# Patient Record
Sex: MALE | Race: WHITE | NOT HISPANIC OR LATINO | URBAN - METROPOLITAN AREA
[De-identification: names, ages, dates, MRNs, and addresses within clinical notes are randomized per-mention and may not be internally consistent; named-entity substitution may affect disease eponyms.]

---

## 2021-05-04 ENCOUNTER — INPATIENT (INPATIENT)
Facility: HOSPITAL | Age: 46
LOS: 5 days | Discharge: PSYCHIATRIC OR SUBSTANCE USE FACILITY | End: 2021-05-10
Attending: LEGAL MEDICINE | Admitting: LEGAL MEDICINE
Payer: MEDICARE

## 2021-05-04 VITALS
RESPIRATION RATE: 18 BRPM | TEMPERATURE: 99 F | DIASTOLIC BLOOD PRESSURE: 97 MMHG | OXYGEN SATURATION: 99 % | HEART RATE: 100 BPM | SYSTOLIC BLOOD PRESSURE: 180 MMHG

## 2021-05-04 DIAGNOSIS — R41.82 ALTERED MENTAL STATUS, UNSPECIFIED: ICD-10-CM

## 2021-05-04 LAB
ALBUMIN SERPL ELPH-MCNC: 3.8 G/DL — SIGNIFICANT CHANGE UP (ref 3.3–5)
ALP SERPL-CCNC: 95 U/L — SIGNIFICANT CHANGE UP (ref 40–120)
ALT FLD-CCNC: 6 U/L — SIGNIFICANT CHANGE UP (ref 4–41)
ANION GAP SERPL CALC-SCNC: 13 MMOL/L — SIGNIFICANT CHANGE UP (ref 7–14)
APAP SERPL-MCNC: <15 UG/ML — SIGNIFICANT CHANGE UP (ref 15–25)
AST SERPL-CCNC: 11 U/L — SIGNIFICANT CHANGE UP (ref 4–40)
BASOPHILS # BLD AUTO: 0.04 K/UL — SIGNIFICANT CHANGE UP (ref 0–0.2)
BASOPHILS NFR BLD AUTO: 0.4 % — SIGNIFICANT CHANGE UP (ref 0–2)
BILIRUB SERPL-MCNC: 0.7 MG/DL — SIGNIFICANT CHANGE UP (ref 0.2–1.2)
BUN SERPL-MCNC: 13 MG/DL — SIGNIFICANT CHANGE UP (ref 7–23)
CALCIUM SERPL-MCNC: 9 MG/DL — SIGNIFICANT CHANGE UP (ref 8.4–10.5)
CHLORIDE SERPL-SCNC: 101 MMOL/L — SIGNIFICANT CHANGE UP (ref 98–107)
CO2 SERPL-SCNC: 28 MMOL/L — SIGNIFICANT CHANGE UP (ref 22–31)
CREAT SERPL-MCNC: 0.83 MG/DL — SIGNIFICANT CHANGE UP (ref 0.5–1.3)
EOSINOPHIL # BLD AUTO: 0.02 K/UL — SIGNIFICANT CHANGE UP (ref 0–0.5)
EOSINOPHIL NFR BLD AUTO: 0.2 % — SIGNIFICANT CHANGE UP (ref 0–6)
ETHANOL SERPL-MCNC: <10 MG/DL — SIGNIFICANT CHANGE UP
GLUCOSE SERPL-MCNC: 94 MG/DL — SIGNIFICANT CHANGE UP (ref 70–99)
HCT VFR BLD CALC: 44.6 % — SIGNIFICANT CHANGE UP (ref 39–50)
HGB BLD-MCNC: 15.2 G/DL — SIGNIFICANT CHANGE UP (ref 13–17)
IANC: 9.77 K/UL — HIGH (ref 1.5–8.5)
IMM GRANULOCYTES NFR BLD AUTO: 0.3 % — SIGNIFICANT CHANGE UP (ref 0–1.5)
LYMPHOCYTES # BLD AUTO: 0.75 K/UL — LOW (ref 1–3.3)
LYMPHOCYTES # BLD AUTO: 6.6 % — LOW (ref 13–44)
MCHC RBC-ENTMCNC: 30.5 PG — SIGNIFICANT CHANGE UP (ref 27–34)
MCHC RBC-ENTMCNC: 34.1 GM/DL — SIGNIFICANT CHANGE UP (ref 32–36)
MCV RBC AUTO: 89.4 FL — SIGNIFICANT CHANGE UP (ref 80–100)
MONOCYTES # BLD AUTO: 0.75 K/UL — SIGNIFICANT CHANGE UP (ref 0–0.9)
MONOCYTES NFR BLD AUTO: 6.6 % — SIGNIFICANT CHANGE UP (ref 2–14)
NEUTROPHILS # BLD AUTO: 9.77 K/UL — HIGH (ref 1.8–7.4)
NEUTROPHILS NFR BLD AUTO: 85.9 % — HIGH (ref 43–77)
NRBC # BLD: 0 /100 WBCS — SIGNIFICANT CHANGE UP
NRBC # FLD: 0 K/UL — SIGNIFICANT CHANGE UP
PLATELET # BLD AUTO: 243 K/UL — SIGNIFICANT CHANGE UP (ref 150–400)
POTASSIUM SERPL-MCNC: 3.4 MMOL/L — LOW (ref 3.5–5.3)
POTASSIUM SERPL-SCNC: 3.4 MMOL/L — LOW (ref 3.5–5.3)
PROT SERPL-MCNC: 6.5 G/DL — SIGNIFICANT CHANGE UP (ref 6–8.3)
RBC # BLD: 4.99 M/UL — SIGNIFICANT CHANGE UP (ref 4.2–5.8)
RBC # FLD: 12.5 % — SIGNIFICANT CHANGE UP (ref 10.3–14.5)
SALICYLATES SERPL-MCNC: <5 MG/DL — LOW (ref 15–30)
SARS-COV-2 RNA SPEC QL NAA+PROBE: SIGNIFICANT CHANGE UP
SODIUM SERPL-SCNC: 142 MMOL/L — SIGNIFICANT CHANGE UP (ref 135–145)
TOXICOLOGY SCREEN, DRUGS OF ABUSE, SERUM RESULT: SIGNIFICANT CHANGE UP
TSH SERPL-MCNC: 1.63 UIU/ML — SIGNIFICANT CHANGE UP (ref 0.27–4.2)
WBC # BLD: 11.36 K/UL — HIGH (ref 3.8–10.5)
WBC # FLD AUTO: 11.36 K/UL — HIGH (ref 3.8–10.5)

## 2021-05-04 PROCEDURE — 70450 CT HEAD/BRAIN W/O DYE: CPT | Mod: 26,77

## 2021-05-04 PROCEDURE — 72125 CT NECK SPINE W/O DYE: CPT | Mod: 26

## 2021-05-04 PROCEDURE — 70450 CT HEAD/BRAIN W/O DYE: CPT | Mod: 26

## 2021-05-04 PROCEDURE — 99233 SBSQ HOSP IP/OBS HIGH 50: CPT

## 2021-05-04 PROCEDURE — 71045 X-RAY EXAM CHEST 1 VIEW: CPT | Mod: 26

## 2021-05-04 PROCEDURE — 99285 EMERGENCY DEPT VISIT HI MDM: CPT | Mod: CS,GC

## 2021-05-04 RX ORDER — POTASSIUM CHLORIDE 20 MEQ
40 PACKET (EA) ORAL ONCE
Refills: 0 | Status: DISCONTINUED | OUTPATIENT
Start: 2021-05-04 | End: 2021-05-04

## 2021-05-04 RX ORDER — SODIUM CHLORIDE 9 MG/ML
1000 INJECTION INTRAMUSCULAR; INTRAVENOUS; SUBCUTANEOUS ONCE
Refills: 0 | Status: COMPLETED | OUTPATIENT
Start: 2021-05-04 | End: 2021-05-04

## 2021-05-04 RX ORDER — POTASSIUM CHLORIDE 20 MEQ
10 PACKET (EA) ORAL ONCE
Refills: 0 | Status: COMPLETED | OUTPATIENT
Start: 2021-05-04 | End: 2021-05-04

## 2021-05-04 RX ORDER — HYDRALAZINE HCL 50 MG
2.5 TABLET ORAL ONCE
Refills: 0 | Status: COMPLETED | OUTPATIENT
Start: 2021-05-04 | End: 2021-05-04

## 2021-05-04 RX ADMIN — SODIUM CHLORIDE 1000 MILLILITER(S): 9 INJECTION INTRAMUSCULAR; INTRAVENOUS; SUBCUTANEOUS at 19:09

## 2021-05-04 RX ADMIN — Medication 2.5 MILLIGRAM(S): at 23:42

## 2021-05-04 RX ADMIN — Medication 100 MILLIEQUIVALENT(S): at 19:09

## 2021-05-04 NOTE — ED PROVIDER NOTE - NS ED ROS FT
CONSTITUTIONAL - No fever, No diaphoresis, No weight change  SKIN - No rash  HEMATOLOGIC - No abnormal bleeding or bruising  EYES - No eye pain, No blurred vision  ENT - No change in hearing, No sore throat, No neck pain, No rhinorrhea, No ear pain  RESPIRATORY - No shortness of breath, No cough  CARDIAC -No chest pain, No palpitations  GI - No abdominal pain, No nausea, No vomiting, No diarrhea, No constipation  - No dysuria, no frequency, no hematuria.   MUSCULOSKELETAL - No joint pain, No swelling, No back pain  NEUROLOGIC - No numbness, No focal weakness, No headache, No dizziness

## 2021-05-04 NOTE — CONSULT NOTE ADULT - ASSESSMENT
45 year old M h/o Schizoprenia 45 year old M h/o Schizoprenia, brought in by father for patient inability to care for himself  On exam, various aged bruises on shoulder and chest    Neurosurgery called for hyperdensity in 3rd ventricle tiny colloid cyst vs blood      - Repeat CT head in 4 hours, if no change in hyperdensity, can proceed with discharge to [facility] if needed  - Patient would benefit from non urgent MRI brain +/- at some point to evaluate if colloid cyst is present  - Case d/w Dr. Tello

## 2021-05-04 NOTE — ED ADULT TRIAGE NOTE - CHIEF COMPLAINT QUOTE
Pt brought in by EMS from home with dad at bedside. Pt has developmental delays and over the past year has been declining but most recently over the past week. Pt is refusing to care for self and refusing to eat.

## 2021-05-04 NOTE — PROVIDER CONTACT NOTE (OTHER) - ASSESSMENT
Patient denies chest pain Patient denies chest pain or headache at this time. Vitals as noted in flowsheet.

## 2021-05-04 NOTE — ED ADULT NURSE NOTE - OBJECTIVE STATEMENT
Receive pt in spot 11 alert and oriented to name brought in by father with decrease in ADL's, x 1 yr;  poor appetite and refusing to go outside since covid.  Pt with flat affect.  Resp unlabored,  Skin pale.  Father at bedside.  IV placed. Labs sent
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
## 2021-05-04 NOTE — ED PROVIDER NOTE - PHYSICAL EXAMINATION
CONSTITUTIONAL: Well-developed; well-nourished; in no acute distress.   SKIN: warm, dry  HEAD: Normocephalic; atraumatic.  EYES: no conjunctival injection.   ENT: No nasal discharge; airway clear.  NECK: Supple; non tender. Erythema around cervical/occipital region.   CARD: S1, S2 normal; no murmurs, gallops, or rubs. Regular rate and rhythm. Ecchymotic bruises on torso   RESP: No wheezes, rales or rhonchi. Good air movement bilaterally.   ABD: soft ntnd, no guarding, no distention, no rigidity.   EXT: Ambulates independently. Ecchymotic bruise on L bicep. Erythema on back around thoracic   NEURO: Alert. has hx of intellectual disability   PSYCH: withdrawn CONSTITUTIONAL: Well-developed; well-nourished; in no acute distress.   SKIN: warm, dry  HEAD: Normocephalic; atraumatic.  EYES: no conjunctival injection.   ENT: No nasal discharge; airway clear.  NECK: Supple; non tender. Erythema around cervical/occipital region.   CARD: S1, S2 normal; no murmurs, gallops, or rubs. Regular rate and rhythm. Ecchymotic bruises on torso   RESP: No wheezes, rales or rhonchi. Good air movement bilaterally.   ABD: soft ntnd, no guarding, no distention, no rigidity.   EXT: Ambulates independently. Ecchymotic bruise on L bicep. Erythema, resolving ecchymosis on back around thoracic   NEURO: awake. Alert. able to follow simple commands and occasional responds with 1-2 words. sensation and motor intact   PSYCH: withdrawn
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
## 2021-05-04 NOTE — ED ADULT NURSE REASSESSMENT NOTE - NS ED NURSE REASSESS COMMENT FT1
Patient received from day RN. A&Ox2 (oriented to self and place). Patient calm and cooperative. Patient breathing even and nonlabored. No complaints at this time. No acute distress. Safety maintained. Patient stable upon exiting the room.
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
## 2021-05-04 NOTE — ED PROVIDER NOTE - CARE PLAN
Principal Discharge DX:	Change in mental status   Principal Discharge DX:	Change in mental status  Secondary Diagnosis:	Schizophrenia
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
## 2021-05-04 NOTE — ED ADULT NURSE NOTE - ED STAT RN HANDOFF DETAILS
Patient taken over to ESSU2 with PCA Kirandeep. Patient breathing even and nonlabored. Patient stable upon exiting the ED

## 2021-05-05 DIAGNOSIS — F20.9 SCHIZOPHRENIA, UNSPECIFIED: ICD-10-CM

## 2021-05-05 DIAGNOSIS — D72.829 ELEVATED WHITE BLOOD CELL COUNT, UNSPECIFIED: ICD-10-CM

## 2021-05-05 DIAGNOSIS — R93.0 ABNORMAL FINDINGS ON DIAGNOSTIC IMAGING OF SKULL AND HEAD, NOT ELSEWHERE CLASSIFIED: ICD-10-CM

## 2021-05-05 DIAGNOSIS — R41.82 ALTERED MENTAL STATUS, UNSPECIFIED: ICD-10-CM

## 2021-05-05 DIAGNOSIS — Z78.9 OTHER SPECIFIED HEALTH STATUS: Chronic | ICD-10-CM

## 2021-05-05 DIAGNOSIS — Z65.9 PROBLEM RELATED TO UNSPECIFIED PSYCHOSOCIAL CIRCUMSTANCES: ICD-10-CM

## 2021-05-05 DIAGNOSIS — I10 ESSENTIAL (PRIMARY) HYPERTENSION: ICD-10-CM

## 2021-05-05 DIAGNOSIS — Z29.9 ENCOUNTER FOR PROPHYLACTIC MEASURES, UNSPECIFIED: ICD-10-CM

## 2021-05-05 DIAGNOSIS — Z79.899 OTHER LONG TERM (CURRENT) DRUG THERAPY: ICD-10-CM

## 2021-05-05 PROBLEM — Z00.00 ENCOUNTER FOR PREVENTIVE HEALTH EXAMINATION: Status: ACTIVE | Noted: 2021-05-05

## 2021-05-05 LAB
ANION GAP SERPL CALC-SCNC: 15 MMOL/L — HIGH (ref 7–14)
BUN SERPL-MCNC: 9 MG/DL — SIGNIFICANT CHANGE UP (ref 7–23)
CALCIUM SERPL-MCNC: 9 MG/DL — SIGNIFICANT CHANGE UP (ref 8.4–10.5)
CHLORIDE SERPL-SCNC: 98 MMOL/L — SIGNIFICANT CHANGE UP (ref 98–107)
CO2 SERPL-SCNC: 25 MMOL/L — SIGNIFICANT CHANGE UP (ref 22–31)
COVID-19 SPIKE DOMAIN AB INTERP: NEGATIVE — SIGNIFICANT CHANGE UP
COVID-19 SPIKE DOMAIN ANTIBODY RESULT: 0.4 U/ML — SIGNIFICANT CHANGE UP
CREAT SERPL-MCNC: 0.76 MG/DL — SIGNIFICANT CHANGE UP (ref 0.5–1.3)
GLUCOSE BLDC GLUCOMTR-MCNC: 134 MG/DL — HIGH (ref 70–99)
GLUCOSE SERPL-MCNC: 103 MG/DL — HIGH (ref 70–99)
HCT VFR BLD CALC: 45.1 % — SIGNIFICANT CHANGE UP (ref 39–50)
HGB BLD-MCNC: 15.5 G/DL — SIGNIFICANT CHANGE UP (ref 13–17)
MAGNESIUM SERPL-MCNC: 1.8 MG/DL — SIGNIFICANT CHANGE UP (ref 1.6–2.6)
MCHC RBC-ENTMCNC: 30.6 PG — SIGNIFICANT CHANGE UP (ref 27–34)
MCHC RBC-ENTMCNC: 34.4 GM/DL — SIGNIFICANT CHANGE UP (ref 32–36)
MCV RBC AUTO: 89 FL — SIGNIFICANT CHANGE UP (ref 80–100)
NRBC # BLD: 0 /100 WBCS — SIGNIFICANT CHANGE UP
NRBC # FLD: 0 K/UL — SIGNIFICANT CHANGE UP
PHOSPHATE SERPL-MCNC: 1.7 MG/DL — LOW (ref 2.5–4.5)
PLATELET # BLD AUTO: 260 K/UL — SIGNIFICANT CHANGE UP (ref 150–400)
POTASSIUM SERPL-MCNC: 3.3 MMOL/L — LOW (ref 3.5–5.3)
POTASSIUM SERPL-SCNC: 3.3 MMOL/L — LOW (ref 3.5–5.3)
RBC # BLD: 5.07 M/UL — SIGNIFICANT CHANGE UP (ref 4.2–5.8)
RBC # FLD: 12.8 % — SIGNIFICANT CHANGE UP (ref 10.3–14.5)
SARS-COV-2 IGG+IGM SERPL QL IA: 0.4 U/ML — SIGNIFICANT CHANGE UP
SARS-COV-2 IGG+IGM SERPL QL IA: NEGATIVE — SIGNIFICANT CHANGE UP
SODIUM SERPL-SCNC: 138 MMOL/L — SIGNIFICANT CHANGE UP (ref 135–145)
WBC # BLD: 11.98 K/UL — HIGH (ref 3.8–10.5)
WBC # FLD AUTO: 11.98 K/UL — HIGH (ref 3.8–10.5)

## 2021-05-05 PROCEDURE — 99223 1ST HOSP IP/OBS HIGH 75: CPT

## 2021-05-05 RX ORDER — HYDRALAZINE HCL 50 MG
5 TABLET ORAL ONCE
Refills: 0 | Status: COMPLETED | OUTPATIENT
Start: 2021-05-05 | End: 2021-05-05

## 2021-05-05 RX ORDER — HYDRALAZINE HCL 50 MG
5 TABLET ORAL ONCE
Refills: 0 | Status: DISCONTINUED | OUTPATIENT
Start: 2021-05-05 | End: 2021-05-05

## 2021-05-05 RX ORDER — HEPARIN SODIUM 5000 [USP'U]/ML
5000 INJECTION INTRAVENOUS; SUBCUTANEOUS EVERY 12 HOURS
Refills: 0 | Status: DISCONTINUED | OUTPATIENT
Start: 2021-05-05 | End: 2021-05-05

## 2021-05-05 RX ORDER — SODIUM,POTASSIUM PHOSPHATES 278-250MG
1 POWDER IN PACKET (EA) ORAL ONCE
Refills: 0 | Status: COMPLETED | OUTPATIENT
Start: 2021-05-05 | End: 2021-05-05

## 2021-05-05 RX ORDER — AMLODIPINE BESYLATE 2.5 MG/1
5 TABLET ORAL DAILY
Refills: 0 | Status: DISCONTINUED | OUTPATIENT
Start: 2021-05-05 | End: 2021-05-10

## 2021-05-05 RX ORDER — SODIUM CHLORIDE 9 MG/ML
3 INJECTION INTRAMUSCULAR; INTRAVENOUS; SUBCUTANEOUS EVERY 8 HOURS
Refills: 0 | Status: DISCONTINUED | OUTPATIENT
Start: 2021-05-05 | End: 2021-05-10

## 2021-05-05 RX ORDER — POTASSIUM CHLORIDE 20 MEQ
40 PACKET (EA) ORAL ONCE
Refills: 0 | Status: COMPLETED | OUTPATIENT
Start: 2021-05-05 | End: 2021-05-05

## 2021-05-05 RX ORDER — MAGNESIUM SULFATE 500 MG/ML
2 VIAL (ML) INJECTION ONCE
Refills: 0 | Status: COMPLETED | OUTPATIENT
Start: 2021-05-05 | End: 2021-05-05

## 2021-05-05 RX ORDER — AMLODIPINE BESYLATE 2.5 MG/1
5 TABLET ORAL DAILY
Refills: 0 | Status: DISCONTINUED | OUTPATIENT
Start: 2021-05-05 | End: 2021-05-05

## 2021-05-05 RX ORDER — AMLODIPINE BESYLATE 2.5 MG/1
5 TABLET ORAL ONCE
Refills: 0 | Status: DISCONTINUED | OUTPATIENT
Start: 2021-05-05 | End: 2021-05-05

## 2021-05-05 RX ADMIN — Medication 50 GRAM(S): at 17:59

## 2021-05-05 RX ADMIN — SODIUM CHLORIDE 3 MILLILITER(S): 9 INJECTION INTRAMUSCULAR; INTRAVENOUS; SUBCUTANEOUS at 13:01

## 2021-05-05 RX ADMIN — Medication 5 MILLIGRAM(S): at 01:00

## 2021-05-05 RX ADMIN — Medication 5 MILLIGRAM(S): at 18:59

## 2021-05-05 RX ADMIN — Medication 5 MILLIGRAM(S): at 06:42

## 2021-05-05 RX ADMIN — SODIUM CHLORIDE 3 MILLILITER(S): 9 INJECTION INTRAMUSCULAR; INTRAVENOUS; SUBCUTANEOUS at 22:07

## 2021-05-05 RX ADMIN — SODIUM CHLORIDE 3 MILLILITER(S): 9 INJECTION INTRAMUSCULAR; INTRAVENOUS; SUBCUTANEOUS at 05:45

## 2021-05-05 RX ADMIN — Medication 5 MILLIGRAM(S): at 11:00

## 2021-05-05 NOTE — BH CONSULTATION LIAISON ASSESSMENT NOTE - NSBHCONSULTRECOMMENDOTHER_PSY_A_CORE FT
-Recommend focus on neuro w/u and medical optimization. F/u EEG results and repeat CT Head. R/o infectious etiologies given elevated WBC  -Monitor blood pressures   -Continue Routine observation as you are   -Consider restarting outpatient meds. Risperdal 4mg QD and Sertraline 100 mg QD.   -May give 2mg IV ativan q6 PRN for agitation   -Ongoing consideration for dispo to [facility] once medically cleared for psychiatric stabilization.   -Will continue to follow  -Recommend focus on neuro w/u and medical optimization. F/u EEG results and repeat CT Head. R/o infectious etiologies given elevated WBC  -Monitor blood pressures   -Continue Routine observation as you are   -Consider restarting outpatient meds. Risperdal 4mg QD and Sertraline 100 mg QD.   -May give 2mg IV ativan q6 PRN for agitation   -Collateral pending discussion with patient's father tomorrow   -Ongoing consideration for dispo to [facility] once medically cleared for psychiatric stabilization.   -Will continue to follow
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
## 2021-05-05 NOTE — CONSULT NOTE ADULT - ASSESSMENT
44 y/o LH M with schizophrenia (follows with Dr. Brooks lopez) presenting to the ED for concerns of gradual functional decline with loss of ADLs over the past several months, most prominently over the past month and appearing confused. Neurology consulted on 5/5 for concerns of seizure-like activity. Neurologic examination nonfocal with baseline R exotropia and brisk bilateral patellar reflexes but downgoing plantar reflexes. Patient is hypophonic and provides limited information, but given the history of generalized stiffening event with associated tongue biting and reported autonomic instability (elevated blood pressure and tachycardia), would require further diagnostic interventions (e.g. overnight EEG) for event characterization/capture. To further explore the functional decline, would recommend broadening workup to include nutritional (given decreased PO intake for prolonged time) and infectious etiologies (less likely given afebrile, no significant infectious sources or clinical symptoms of infeciton) that may contribute to current condition.     Impression: Generalized stiffening with "head banging" against the side rail and tongue biting may be secondary to behavioral etiology from underlying schizophrenia versus underlying primary seizure disorder.     Recommendations:   [ ] video EEG monitoring  [ ] Would monitor AEDs at this time  [ ] Please check B1, B6, B12, Folate, UA  [ ] To follow up CT head findings, would consider MR head w/w/o contrast   [ ] For generalized tonic clonic seizures lasting > 3 minutes, would recommend Ativan 2mg IV push.       -Please document patient's vital signs, pupillary exam prior to benzodiazepine administration.     Case to be discussed with Neurology attending.  44 y/o LH M with schizophrenia (follows with Dr. Guy regularly) presenting to the ED for concerns of gradual functional decline with loss of ADLs over the past several months, most prominently over the past month and appearing confused. Neurology consulted on 5/5 for concerns of seizure-like activity. Neurologic examination nonfocal with baseline R exotropia and brisk bilateral patellar reflexes but downgoing plantar reflexes. Patient is hypophonic and provides limited information, but given the history of generalized stiffening event with associated tongue biting and reported autonomic instability (elevated blood pressure and tachycardia), would require further diagnostic interventions (e.g. overnight EEG) for event characterization/capture. To further explore the functional decline, would recommend broadening workup to include nutritional (given decreased PO intake for prolonged time) and infectious etiologies (less likely given afebrile, no significant infectious sources or clinical symptoms of infeciton) that may contribute to current condition.     Impression: Generalized stiffening with "head banging" against the side rail and tongue biting may be secondary to behavioral etiology from underlying schizophrenia versus underlying primary seizure disorder.     Recommendations:   [ ] video EEG monitoring  [ ] Would monitor AEDs at this time  [ ] Please check B1, B6, B12, Folate, UA  [ ] To follow up CT head findings, would consider MR head w/w/o contrast   [ ] For generalized tonic clonic seizures lasting > 3 minutes, would recommend Ativan 2mg IV push.       -Please document patient's vital signs, pupillary exam prior to benzodiazepine administration.   [ ] Agree with primary team for psychiatry evaluation to optimize medication regimen.   [ ] Will continue to follow patient, please call [phone] for any acute changes.     Case to be discussed with Neurology attending.  44 y/o LH M with schizophrenia (follows with Dr. Guy regularly) presenting to the ED for concerns of gradual functional decline with loss of ADLs over the past several months, most prominently over the past month and appearing confused. Neurology consulted on 5/5 for concerns of seizure-like activity. Neurologic examination nonfocal with baseline R exotropia and brisk bilateral patellar reflexes but downgoing plantar reflexes. Patient is hypophonic and provides limited information, but given the history of generalized stiffening event with associated tongue biting and reported autonomic instability (elevated blood pressure and tachycardia), would require further diagnostic interventions (e.g. overnight EEG) for event characterization/capture. To further explore the functional decline, would recommend broadening workup to include nutritional (given decreased PO intake for prolonged time) and infectious etiologies (less likely given afebrile, no significant infectious sources or clinical symptoms of infeciton) that may contribute to current condition.     Impression: Generalized stiffening with "head banging" against the side rail and tongue biting may be secondary to underlying PRES from uncontrolled blood pressures versus behavioral etiology from underlying schizophrenia versus underlying primary seizure disorder.     Recommendations:   [ ] video EEG monitoring  [ ] Would monitor AEDs at this time  [ ] Please check B1, B6, B12, Folate, UA  [ ] To follow up CT head findings and for concerns of PRES, would consider MR head w/w/o contrast   [ ] For generalized tonic clonic seizures lasting > 3 minutes, would recommend Ativan 2mg IV push.       -Please document patient's vital signs, pupillary exam prior to benzodiazepine administration.   [ ] Agree with primary team for psychiatry evaluation to optimize medication regimen.   [ ] Will continue to follow patient, please call [phone] for any acute changes.     Case to be discussed with Neurology attending.

## 2021-05-05 NOTE — CHART NOTE - NSCHARTNOTEFT_GEN_A_CORE
called to evaluate pt this am unresponsive.  upon entry into room pt had stiffening of limbs and upward eye deviation lasting for less than one minute with return to baseline.  vitals 220/110 hr 130 finger stick 134 ekg sinus tachycardia.  eeg ordered and repeat head ct ordered.  neurology consulted for concern for new onset seizures.  NS following initial head ct  possible small hemorrhage.  repeat head ct stable.  discussed with ns will repeat head ct.  dr nugent aware of above     addendum to above - eeg started will obtain head ct tomorrow once completed called to evaluate pt this am unresponsive.  upon entry into room pt had stiffening of limbs and upward eye deviation lasting for less than one minute with return to baseline.  vitals 220/110 hr 130 finger stick 134 ekg sinus tachycardia.  eeg ordered and repeat head ct ordered.  neurology consulted for concern for new onset seizures.  NS following initial head ct  possible small hemorrhage.  repeat head ct stable.  discussed with ns will repeat head ct.  hydralazine 5mg iv x 1 repeat bp 158/98.  dr nugent aware of above     addendum to above - eeg started will obtain head ct tomorrow once completed
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
## 2021-05-05 NOTE — BH CONSULTATION LIAISON ASSESSMENT NOTE - CURRENT MEDICATION
MEDICATIONS  (STANDING):  amLODIPine   Tablet 5 milliGRAM(s) Oral daily  sodium chloride 0.9% lock flush 3 milliLiter(s) IV Push every 8 hours    MEDICATIONS  (PRN):

## 2021-05-05 NOTE — PATIENT PROFILE ADULT - STATED REASON FOR ADMISSION
Unable to obtain, patient refuses to speak, patient says he does not belong here and wants his father

## 2021-05-05 NOTE — H&P ADULT - PROBLEM SELECTOR PLAN 2
CT head showed 2 x 2 mm indeterminate hyperdensity in the region of anterior third ventricle. Although this may be related to a tiny colloid cyst, small acute hemorrhage not excluded.   Neurosurgery consulted. Recs appreciated.  Repeat CT head Stable.  Per neurosurgery Patient would benefit from non urgent MRI brain +/- at some point to evaluate if colloid cyst is present.

## 2021-05-05 NOTE — PATIENT PROFILE ADULT - NSPRESCRALCAMT_GEN_A_NUR
Unable to determine, patient does not speak only states he does not belong here and that he wants his father
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
## 2021-05-05 NOTE — BH CONSULTATION LIAISON ASSESSMENT NOTE - DIFFERENTIAL
DDx: Acute schizophrenic episode with functional deterioration   r/o new onset seizures   r/o underlying medical issues ie: infection vs ICH, etc.

## 2021-05-05 NOTE — H&P ADULT - PROBLEM SELECTOR PLAN 8
Heparin subcutaneous 5000 units q12h. Spoke to neurosurgery regarding Prophylaxis. Was told to hold off for now and call in AM for recommendation.

## 2021-05-05 NOTE — H&P ADULT - PROBLEM SELECTOR PLAN 5
Patient on admissison with SBP in 180s.  Unable to obtain medical history.  Patient s/p hydralazine 5 mg IVP. .  Patient denies headache.  Will start patient on amlodipine 5 mg. Patient on admissison with SBP in 180s.  Unable to obtain medical history.  Patient s/p hydralazine 2.5 mg IVP and 5 mg IVP. SBP down from 195 to 171.  Will continue to monitor for now  Patient denies headache.  Will start patient on amlodipine 5 mg.

## 2021-05-05 NOTE — CHART NOTE - NSCHARTNOTEFT_GEN_A_CORE
PRELIMINARY EEG REVIEW    EEG reviewed to 13:43  Date: 05-05-21    - No epileptiform pattern or seizure seen.    This Preliminary report is based on fellow review. Final report pending Completion of study tomorrow morning and following attending review.    Reading Room: [phone]  On Call Service After Hours: [phone]    Akash Frey MD PGY-5  Epilepsy Fellow    This Preliminary report is based on fellow review. Final report pending attending review.    Reading Room: [phone]  On Call Service After Hours: [phone]

## 2021-05-05 NOTE — CONSULT NOTE ADULT - ATTENDING COMMENTS
NEUROLOGY ATTENDING:  [phone]    CC: POSSIBLE SEIZURE.    PATIENT SEEN AND EXAMINED WITH NEUROLOGY RESIDENTS ON 5/5/21  SUNRISE NOTES REVIEWED  IMAGING REVIEWD  LABS REVIEWED    Briefly, 46 yo RH man with intellectual disability/schizophrenia, who is typically independent for ADLs, but was brought to the [facility] ED by his father for decreased interaction since his mother has been ill. over the past few weeks, he has had poor hygeine and decreased responsiveness.    On arrival to ED, YS=836/110.    patient denies headaches and is interactive, but appears to be responding to internal stimuli. He states he does not want ot be in the hospital and would like to go home.    IMPRESSION  Agree with plan for EEG.  Doubt seizure activity, although malignant hypertension may be complicated by seizures.   Would control hypertension  Psych input likely valuable.
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
## 2021-05-05 NOTE — BH CONSULTATION LIAISON ASSESSMENT NOTE - CASE SUMMARY
Chart reviewed. Discussed in AM rounds. Seen and was altered, partially responsive, undergoing medical assessments. Did not interview him meaningfully and prioritized medical evaluations/rule-out. Will review more closely tomorrow 5/6 and continue to follow.

## 2021-05-05 NOTE — BH CONSULTATION LIAISON ASSESSMENT NOTE - NSBHCHARTREVIEWVS_PSY_A_CORE FT
Vital Signs Last 24 Hrs  T(C): 37.2 (05 May 2021 12:50), Max: 37.2 (05 May 2021 12:50)  T(F): 99 (05 May 2021 12:50), Max: 99 (05 May 2021 12:50)  HR: 119 (05 May 2021 12:50) (70 - 129)  BP: 158/98 (05 May 2021 12:50) (155/98 - 210/110)  BP(mean): --  RR: 17 (05 May 2021 12:50) (17 - 19)  SpO2: 97% (05 May 2021 12:50) (97% - 99%)

## 2021-05-05 NOTE — H&P ADULT - NSHPLABSRESULTS_GEN_ALL_CORE
Vital Signs Last 24 Hrs  T(C): 36.8 (05 May 2021 01:25), Max: 37 (04 May 2021 11:30)  T(F): 98.3 (05 May 2021 01:25), Max: 98.6 (04 May 2021 11:30)  HR: 103 (05 May 2021 01:25) (70 - 103)  BP: 183/108 (05 May 2021 01:25) (180/97 - 195/112)  BP(mean): --  RR: 18 (05 May 2021 01:25) (17 - 18)  SpO2: 97% (05 May 2021 01:25) (97% - 99%)                          15.2   11.36 )-----------( 243      ( 04 May 2021 13:15 )             44.6     TSH: 1.63    05-04    142  |  101  |  13  ----------------------------<  94  3.4<L>   |  28  |  0.83    Serum toxicology: negative    COVID 19 PCR: Negative.    CT Head no contrast:  FINDINGS:  Head:  2 x 2 mm hyperdensity noted in the region of anterior third ventricle.    There is no parenchymal mass, mass effect or midline shift. There is no acute territorial infarct. There is no hydrocephalus.    The cranium is intact.    Small retention cyst/polyp left maxillary sinus    Cervical spine:  Vertebral body heights are intact. Grade 1 anterolisthesis of C5 on C6.    There is no prevertebral soft tissue swelling. The odontoid is intact.    Evaluation of the spinal canal is limited on a CT exam.  Multilevel degenerative changes noted resulting in multilevel spinal canal stenosis and neural foraminal narrowing.    Small calcification left thyroid gland noted    IMPRESSION:  2 x 2 mm indeterminate hyperdensity in the region of anterior third ventricle. Although this may be related to a tiny colloid cyst, small acute hemorrhage not excluded. Short-term follow-up CT head recommended. Comparison with older CT head exams would be helpful if available     No acute fracture cervical spine. If pain persists, follow-up MRI exam recommended    Repeat CT Head:    FINDINGS:  VENTRICLES AND SULCI:  Unchanged in size and appearance. A tiny focus of hyperdensity is seen within the anterior third ventricle, stable.  INTRA-AXIAL:  No mass, blood or abnormal attenuation is otherwise seen.  EXTRA-AXIAL:  No mass or collection is seen.  VISUALIZED SINUSES:  Clear.  VISUALIZED MASTOIDS:  Clear.  CALVARIUM:  Normal.  MISCELLANEOUS:  None.    IMPRESSION:  Stable exam.    CT C-spine:  Head:  2 x 2 mm hyperdensity noted in the region of anterior third ventricle.    There is no parenchymal mass, mass effect or midline shift. There is no acute territorial infarct. There is no hydrocephalus.    The cranium is intact.    Small retention cyst/polyp left maxillary sinus    Cervical spine:  Vertebral body heights are intact. Grade 1 anterolisthesis of C5 on C6.    There is no prevertebral soft tissue swelling. The odontoid is intact.    Evaluation of the spinal canal is limited on a CT exam.  Multilevel degenerative changes noted resulting in multilevel spinal canal stenosis and neural foraminal narrowing.    Small calcification left thyroid gland noted    IMPRESSION:  2 x 2 mm indeterminate hyperdensity in the region of anterior third ventricle. Although this may be related to a tiny colloid cyst, small acute hemorrhage not excluded. Short-term follow-up CT head recommended. Comparison with older CT head exams would be helpful if available     No acute fracture cervical spine. If pain persists, follow-up MRI exam recommended      CXR Portable:  Question trace left pleural effusion versus artifact from overlying soft tissue. No focal consolidation. No pneumothorax..

## 2021-05-05 NOTE — H&P ADULT - PROBLEM SELECTOR PLAN 1
Admit to medicine, continue monitoring.  Patient oriented only to self. Not answering where he is and month of year.  Patient with flat affect.  Will call for psych consult.  Per OMR patient on Risperidone 4mg and Sertraline 100 mg. Will devan to verify medications in AM.  Serum tox negative. Urine tox ordered.  Collateral information needed. Likely worsening of schizophrenia.  No evidince of metabolic cause.  Doubt CT findings are causing pt's presentation  Patient oriented only to self. Not answering where he is and month of year.  Patient with flat affect.  Will call for psych consult.  Per OMR patient on Risperidone 4mg and Sertraline 100 mg. Will devan to verify medications in AM.  Serum tox negative. Urine tox ordered.  Collateral information needed.

## 2021-05-06 LAB
A1C WITH ESTIMATED AVERAGE GLUCOSE RESULT: 5.4 % — SIGNIFICANT CHANGE UP (ref 4–5.6)
ANION GAP SERPL CALC-SCNC: 16 MMOL/L — HIGH (ref 7–14)
APPEARANCE UR: CLEAR — SIGNIFICANT CHANGE UP
BACTERIA # UR AUTO: NEGATIVE — SIGNIFICANT CHANGE UP
BASOPHILS # BLD AUTO: 0.02 K/UL — SIGNIFICANT CHANGE UP (ref 0–0.2)
BASOPHILS NFR BLD AUTO: 0.2 % — SIGNIFICANT CHANGE UP (ref 0–2)
BILIRUB UR-MCNC: ABNORMAL
BUN SERPL-MCNC: 11 MG/DL — SIGNIFICANT CHANGE UP (ref 7–23)
CALCIUM SERPL-MCNC: 9 MG/DL — SIGNIFICANT CHANGE UP (ref 8.4–10.5)
CHLORIDE SERPL-SCNC: 99 MMOL/L — SIGNIFICANT CHANGE UP (ref 98–107)
CHOLEST SERPL-MCNC: 151 MG/DL — SIGNIFICANT CHANGE UP
CK SERPL-CCNC: 47 U/L — SIGNIFICANT CHANGE UP (ref 30–200)
CO2 SERPL-SCNC: 24 MMOL/L — SIGNIFICANT CHANGE UP (ref 22–31)
COLOR SPEC: YELLOW — SIGNIFICANT CHANGE UP
CREAT SERPL-MCNC: 0.76 MG/DL — SIGNIFICANT CHANGE UP (ref 0.5–1.3)
DIFF PNL FLD: ABNORMAL
EOSINOPHIL # BLD AUTO: 0.01 K/UL — SIGNIFICANT CHANGE UP (ref 0–0.5)
EOSINOPHIL NFR BLD AUTO: 0.1 % — SIGNIFICANT CHANGE UP (ref 0–6)
EPI CELLS # UR: 1 /HPF — SIGNIFICANT CHANGE UP (ref 0–5)
ESTIMATED AVERAGE GLUCOSE: 108 MG/DL — SIGNIFICANT CHANGE UP (ref 68–114)
FOLATE SERPL-MCNC: 6.6 NG/ML — SIGNIFICANT CHANGE UP (ref 3.1–17.5)
GLUCOSE SERPL-MCNC: 99 MG/DL — SIGNIFICANT CHANGE UP (ref 70–99)
GLUCOSE UR QL: NEGATIVE — SIGNIFICANT CHANGE UP
HCT VFR BLD CALC: 45.8 % — SIGNIFICANT CHANGE UP (ref 39–50)
HDLC SERPL-MCNC: 59 MG/DL — SIGNIFICANT CHANGE UP
HGB BLD-MCNC: 15.6 G/DL — SIGNIFICANT CHANGE UP (ref 13–17)
HYALINE CASTS # UR AUTO: 1 /LPF — SIGNIFICANT CHANGE UP (ref 0–7)
IANC: 9.17 K/UL — HIGH (ref 1.5–8.5)
IMM GRANULOCYTES NFR BLD AUTO: 0.4 % — SIGNIFICANT CHANGE UP (ref 0–1.5)
KETONES UR-MCNC: ABNORMAL
LEUKOCYTE ESTERASE UR-ACNC: NEGATIVE — SIGNIFICANT CHANGE UP
LIPID PNL WITH DIRECT LDL SERPL: 76 MG/DL — SIGNIFICANT CHANGE UP
LYMPHOCYTES # BLD AUTO: 0.81 K/UL — LOW (ref 1–3.3)
LYMPHOCYTES # BLD AUTO: 7.5 % — LOW (ref 13–44)
MAGNESIUM SERPL-MCNC: 2.2 MG/DL — SIGNIFICANT CHANGE UP (ref 1.6–2.6)
MCHC RBC-ENTMCNC: 30.5 PG — SIGNIFICANT CHANGE UP (ref 27–34)
MCHC RBC-ENTMCNC: 34.1 GM/DL — SIGNIFICANT CHANGE UP (ref 32–36)
MCV RBC AUTO: 89.6 FL — SIGNIFICANT CHANGE UP (ref 80–100)
MONOCYTES # BLD AUTO: 0.81 K/UL — SIGNIFICANT CHANGE UP (ref 0–0.9)
MONOCYTES NFR BLD AUTO: 7.5 % — SIGNIFICANT CHANGE UP (ref 2–14)
NEUTROPHILS # BLD AUTO: 9.17 K/UL — HIGH (ref 1.8–7.4)
NEUTROPHILS NFR BLD AUTO: 84.3 % — HIGH (ref 43–77)
NITRITE UR-MCNC: NEGATIVE — SIGNIFICANT CHANGE UP
NON HDL CHOLESTEROL: 92 MG/DL — SIGNIFICANT CHANGE UP
NRBC # BLD: 0 /100 WBCS — SIGNIFICANT CHANGE UP
NRBC # FLD: 0 K/UL — SIGNIFICANT CHANGE UP
PH UR: 6 — SIGNIFICANT CHANGE UP (ref 5–8)
PHOSPHATE SERPL-MCNC: 2.5 MG/DL — SIGNIFICANT CHANGE UP (ref 2.5–4.5)
PLATELET # BLD AUTO: 266 K/UL — SIGNIFICANT CHANGE UP (ref 150–400)
POTASSIUM SERPL-MCNC: 3.2 MMOL/L — LOW (ref 3.5–5.3)
POTASSIUM SERPL-SCNC: 3.2 MMOL/L — LOW (ref 3.5–5.3)
PROT UR-MCNC: ABNORMAL
RBC # BLD: 5.11 M/UL — SIGNIFICANT CHANGE UP (ref 4.2–5.8)
RBC # FLD: 12.8 % — SIGNIFICANT CHANGE UP (ref 10.3–14.5)
RBC CASTS # UR COMP ASSIST: 2 /HPF — SIGNIFICANT CHANGE UP (ref 0–4)
SODIUM SERPL-SCNC: 139 MMOL/L — SIGNIFICANT CHANGE UP (ref 135–145)
SP GR SPEC: 1.03 — HIGH (ref 1.01–1.02)
TRIGL SERPL-MCNC: 81 MG/DL — SIGNIFICANT CHANGE UP
TSH SERPL-MCNC: 1.93 UIU/ML — SIGNIFICANT CHANGE UP (ref 0.27–4.2)
UROBILINOGEN FLD QL: ABNORMAL
VIT B12 SERPL-MCNC: 357 PG/ML — SIGNIFICANT CHANGE UP (ref 200–900)
WBC # BLD: 10.86 K/UL — HIGH (ref 3.8–10.5)
WBC # FLD AUTO: 10.86 K/UL — HIGH (ref 3.8–10.5)
WBC UR QL: 4 /HPF — SIGNIFICANT CHANGE UP (ref 0–5)

## 2021-05-06 PROCEDURE — 95720 EEG PHY/QHP EA INCR W/VEEG: CPT

## 2021-05-06 PROCEDURE — 70450 CT HEAD/BRAIN W/O DYE: CPT | Mod: 26

## 2021-05-06 PROCEDURE — 99233 SBSQ HOSP IP/OBS HIGH 50: CPT

## 2021-05-06 RX ORDER — POTASSIUM CHLORIDE 20 MEQ
10 PACKET (EA) ORAL
Refills: 0 | Status: COMPLETED | OUTPATIENT
Start: 2021-05-06 | End: 2021-05-06

## 2021-05-06 RX ORDER — HYDRALAZINE HCL 50 MG
2.5 TABLET ORAL ONCE
Refills: 0 | Status: COMPLETED | OUTPATIENT
Start: 2021-05-06 | End: 2021-05-06

## 2021-05-06 RX ORDER — POTASSIUM CHLORIDE 20 MEQ
40 PACKET (EA) ORAL ONCE
Refills: 0 | Status: COMPLETED | OUTPATIENT
Start: 2021-05-06 | End: 2021-05-06

## 2021-05-06 RX ADMIN — SODIUM CHLORIDE 3 MILLILITER(S): 9 INJECTION INTRAMUSCULAR; INTRAVENOUS; SUBCUTANEOUS at 05:58

## 2021-05-06 RX ADMIN — Medication 100 MILLIEQUIVALENT(S): at 16:17

## 2021-05-06 RX ADMIN — Medication 100 MILLIEQUIVALENT(S): at 14:10

## 2021-05-06 RX ADMIN — Medication 2.5 MILLIGRAM(S): at 01:39

## 2021-05-06 RX ADMIN — Medication 100 MILLIEQUIVALENT(S): at 18:05

## 2021-05-06 RX ADMIN — SODIUM CHLORIDE 3 MILLILITER(S): 9 INJECTION INTRAMUSCULAR; INTRAVENOUS; SUBCUTANEOUS at 14:11

## 2021-05-06 NOTE — EEG REPORT - NS EEG TEXT BOX
[facility]   COMPREHENSIVE EPILEPSY CENTER   REPORT OF LONG-TERM VIDEO EEG     [facility]: [address], [place] [address], Ph#: [phone]    Patient Name: ELIZ HA  Age and : 45y (71)  MRN #: [id number]  Location: [place] [address] A  Referring Physician: Stephane Negro    Start Time/Date: 12:00 on 21  End Time/Date: 08:00 on 21  Duration:    _____________________________________________________________  STUDY INFORMATION    EEG Recording Technique:  The patient underwent continuous Video-EEG monitoring, using Telemetry System hardware on the XLTek Digital System. EEG and video data were stored on a computer hard drive with important events saved in digital archive files. The material was reviewed by a physician (electroencephalographer / epileptologist) on a daily basis. Duy and seizure detection algorithms were utilized and reviewed. An EEG Technician attended to the patient, and was available throughout daytime work hours.  The epilepsy center neurologist was available in person or on call 24-hours per day.    EEG Placement and Labeling of Electrodes:  The EEG was performed utilizing 20 channel referential EEG connections (coronal over temporal over parasagittal montage) using all standard 10-20 electrode placements with EKG, with additional electrodes placed in the inferior temporal region using the modified 10-10 montage electrode placements for elective admissions, or if deemed necessary. Recording was at a sampling rate of 256 samples per second per channel. Time synchronized digital video recording was done simultaneously with EEG recording. A low light infrared camera was used for low light recording.     _____________________________________________________________  HISTORY    Patient is a 45y old  Male who presents with a chief complaint of Change in mental status, schizophrenia (05 May 2021 11:30)      PERTINENT MEDICATION:    _____________________________________________________________  STUDY INTERPRETATION    Findings: The background was continuous, spontaneously variable and reactive. During wakefulness, the posterior dominant rhythm consisted of symmetric, well-modulated 9.5 Hz activity that attenuated to eye opening.  Low amplitude frontal beta was noted in wakefulness.    Background Slowing:  No generalized background slowing was present.    Focal Slowing:   None were present.    Sleep Background:  Drowsiness was characterized by fragmentation, attenuation, and slowing of the background activity.    Rudimentary K-complexes and sleep spindles were recorded.    Other Non-Epileptiform Findings:  None were present.    Interictal Epileptiform Activity:   None were present.    Events:  Clinical events: None recorded.  Seizures: None recorded.    Activation Procedures:    Hyperventilation was not performed.    Photic stimulation was not performed.     Artifacts:  Intermittent myogenic and movement artifacts were noted.    ECG:  The heart rate on single channel ECG was predominantly between 110-120 BPM.    _____________________________________________________________  EEG SUMMARY/CLASSIFICATION    Normal EEG in the awake, drowsy, and asleep states.    _____________________________________________________________  EEG IMPRESSION/CLINICAL CORRELATE    Normal EEG study.  No epileptiform pattern or seizure seen.  _____________________________________________________________    Misha Vanessa DO  PGY-II Neurology    This Preliminary report is based on resident review. Final report pending attending review.    Reading Room: [phone]  On Call Service After Hours: [phone] [facility]   COMPREHENSIVE EPILEPSY CENTER   REPORT OF LONG-TERM VIDEO EEG     [facility]: [address], [place] [address], Ph#: [phone]    Patient Name: ELIZ HA  Age and : 45y (71)  MRN #: [id number]  Location: [place] [address] A  Referring Physician: Stephane Negro    Start Time/Date: 12:00 on 21  End Time/Date: 08:00 on 21  Duration:20H    _____________________________________________________________  STUDY INFORMATION    EEG Recording Technique:  The patient underwent continuous Video-EEG monitoring, using Telemetry System hardware on the XLTek Digital System. EEG and video data were stored on a computer hard drive with important events saved in digital archive files. The material was reviewed by a physician (electroencephalographer / epileptologist) on a daily basis. Duy and seizure detection algorithms were utilized and reviewed. An EEG Technician attended to the patient, and was available throughout daytime work hours.  The epilepsy center neurologist was available in person or on call 24-hours per day.    EEG Placement and Labeling of Electrodes:  The EEG was performed utilizing 20 channel referential EEG connections (coronal over temporal over parasagittal montage) using all standard 10-20 electrode placements with EKG, with additional electrodes placed in the inferior temporal region using the modified 10-10 montage electrode placements for elective admissions, or if deemed necessary. Recording was at a sampling rate of 256 samples per second per channel. Time synchronized digital video recording was done simultaneously with EEG recording. A low light infrared camera was used for low light recording.     _____________________________________________________________  HISTORY    Patient is a 45y old  Male who presents with a chief complaint of Change in mental status, schizophrenia (05 May 2021 11:30)      PERTINENT MEDICATION:    _____________________________________________________________  STUDY INTERPRETATION    Findings: The background was continuous, spontaneously variable and reactive. During wakefulness, the posterior dominant rhythm consisted of symmetric, well-modulated 9.5 Hz activity that attenuated to eye opening.  Low amplitude frontal beta was noted in wakefulness.    Background Slowing:  No generalized background slowing was present.    Focal Slowing:   None were present.    Sleep Background:  Drowsiness was characterized by fragmentation, attenuation, and slowing of the background activity.    Rudimentary K-complexes and sleep spindles were recorded.    Other Non-Epileptiform Findings:  None were present.    Interictal Epileptiform Activity:   None were present.    Events:  Clinical events: None recorded.  Seizures: None recorded.    Activation Procedures:    Hyperventilation was not performed.    Photic stimulation was not performed.     Artifacts:  Intermittent myogenic and movement artifacts were noted.    ECG:  The heart rate on single channel ECG was predominantly between 110-120 BPM.    _____________________________________________________________  EEG SUMMARY/CLASSIFICATION    Normal EEG in the awake, drowsy, and asleep states.    _____________________________________________________________  EEG IMPRESSION/CLINICAL CORRELATE    Normal EEG study.  No epileptiform pattern or seizure seen.  _____________________________________________________________    Misha Vanessa DO  PGY-II Neurology    Jose Benton MD  EEG/Epilepsy Attending    Reading Room: [phone]  On Call Service After Hours: [phone]

## 2021-05-07 LAB
ANION GAP SERPL CALC-SCNC: 14 MMOL/L — SIGNIFICANT CHANGE UP (ref 7–14)
BUN SERPL-MCNC: 16 MG/DL — SIGNIFICANT CHANGE UP (ref 7–23)
CALCIUM SERPL-MCNC: 9.4 MG/DL — SIGNIFICANT CHANGE UP (ref 8.4–10.5)
CHLORIDE SERPL-SCNC: 102 MMOL/L — SIGNIFICANT CHANGE UP (ref 98–107)
CO2 SERPL-SCNC: 24 MMOL/L — SIGNIFICANT CHANGE UP (ref 22–31)
CREAT SERPL-MCNC: 0.75 MG/DL — SIGNIFICANT CHANGE UP (ref 0.5–1.3)
GLUCOSE SERPL-MCNC: 106 MG/DL — HIGH (ref 70–99)
MAGNESIUM SERPL-MCNC: 2.1 MG/DL — SIGNIFICANT CHANGE UP (ref 1.6–2.6)
PHOSPHATE SERPL-MCNC: 1.8 MG/DL — LOW (ref 2.5–4.5)
POTASSIUM SERPL-MCNC: 3.4 MMOL/L — LOW (ref 3.5–5.3)
POTASSIUM SERPL-SCNC: 3.4 MMOL/L — LOW (ref 3.5–5.3)
SODIUM SERPL-SCNC: 140 MMOL/L — SIGNIFICANT CHANGE UP (ref 135–145)

## 2021-05-07 PROCEDURE — 99233 SBSQ HOSP IP/OBS HIGH 50: CPT

## 2021-05-07 RX ORDER — POTASSIUM PHOSPHATE, MONOBASIC POTASSIUM PHOSPHATE, DIBASIC 236; 224 MG/ML; MG/ML
15 INJECTION, SOLUTION INTRAVENOUS ONCE
Refills: 0 | Status: COMPLETED | OUTPATIENT
Start: 2021-05-07 | End: 2021-05-07

## 2021-05-07 RX ORDER — RISPERIDONE 4 MG/1
2 TABLET ORAL DAILY
Refills: 0 | Status: DISCONTINUED | OUTPATIENT
Start: 2021-05-07 | End: 2021-05-10

## 2021-05-07 RX ORDER — SERTRALINE 25 MG/1
100 TABLET, FILM COATED ORAL DAILY
Refills: 0 | Status: DISCONTINUED | OUTPATIENT
Start: 2021-05-07 | End: 2021-05-10

## 2021-05-07 RX ADMIN — SERTRALINE 100 MILLIGRAM(S): 25 TABLET, FILM COATED ORAL at 17:17

## 2021-05-07 RX ADMIN — SODIUM CHLORIDE 3 MILLILITER(S): 9 INJECTION INTRAMUSCULAR; INTRAVENOUS; SUBCUTANEOUS at 21:04

## 2021-05-07 RX ADMIN — SODIUM CHLORIDE 3 MILLILITER(S): 9 INJECTION INTRAMUSCULAR; INTRAVENOUS; SUBCUTANEOUS at 12:42

## 2021-05-07 RX ADMIN — RISPERIDONE 2 MILLIGRAM(S): 4 TABLET ORAL at 17:17

## 2021-05-07 RX ADMIN — POTASSIUM PHOSPHATE, MONOBASIC POTASSIUM PHOSPHATE, DIBASIC 62.5 MILLIMOLE(S): 236; 224 INJECTION, SOLUTION INTRAVENOUS at 12:44

## 2021-05-07 RX ADMIN — Medication 2 MILLIGRAM(S): at 16:39

## 2021-05-07 RX ADMIN — AMLODIPINE BESYLATE 5 MILLIGRAM(S): 2.5 TABLET ORAL at 05:05
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
## 2021-05-07 NOTE — CHART NOTE - NSCHARTNOTEFT_GEN_A_CORE
Interval events noted.     Continuous EEG record reviewed and essentially normal.     Likely decompensated psychiatric disorder. Agree with continued psych eval    Please do try to optimize BP control.   Malignant HTN can manifest with a host of neurological symptoms and maladies.     We will sign off. Thank  you for involving us in the care of this patient.    Reconsult PRN pager [phone].    Call [phone] with any questions or concerns.    D/w neuro attending Dr. Hernandez and primary team. Interval events noted.     Continuous EEG record reviewed and essentially normal.     Likely decompensated psychiatric disorder. Agree with continued psych eval    CTH finding not likely to be hemorrhage. MRI likely maybe low yield BUT would defer to NeuroSx input.  Please do try to optimize BP control.   Malignant HTN can manifest with a host of neurological symptoms and maladies.     We will sign off. Thank  you for involving us in the care of this patient.    Reconsult PRN pager [phone].    Call [phone] with any questions or concerns.    D/w neuro attending Dr. Hernandez and primary team.

## 2021-05-07 NOTE — BH CONSULTATION LIAISON PROGRESS NOTE - NSBHFUPINTERVALHXFT_PSY_A_CORE
Chart reviewed. Patient AOx1. Increased speech latency upon eval today. Patient sitting up in bed teary-eyed with red/puffy eyes, multiple ecchymoses noted in various stages of healing. Patient very withdrawn and not making eye contact on interview with flat, anxious affect. Disorganized thought process with poverty of speech/thought. Patient states that he has not yet eaten today, but engaged when asked about his favorite breakfast food replying "waffles." Patient endorsed feeling nervous, but could not give an answer as to why he was feeling that way. When asked if he thinks he needs inpatient psych care to help get him back to baseline, he replied no, but seemed understanding when it was explained to him why this may be beneficial to him. Denies feeling of depression, AH/VH, SI/HI.   No PRN x 24 hours.

## 2021-05-08 LAB
ANION GAP SERPL CALC-SCNC: 13 MMOL/L — SIGNIFICANT CHANGE UP (ref 7–14)
BASOPHILS # BLD AUTO: 0.04 K/UL — SIGNIFICANT CHANGE UP (ref 0–0.2)
BASOPHILS NFR BLD AUTO: 0.4 % — SIGNIFICANT CHANGE UP (ref 0–2)
BUN SERPL-MCNC: 18 MG/DL — SIGNIFICANT CHANGE UP (ref 7–23)
CALCIUM SERPL-MCNC: 9.3 MG/DL — SIGNIFICANT CHANGE UP (ref 8.4–10.5)
CHLORIDE SERPL-SCNC: 106 MMOL/L — SIGNIFICANT CHANGE UP (ref 98–107)
CO2 SERPL-SCNC: 25 MMOL/L — SIGNIFICANT CHANGE UP (ref 22–31)
CREAT SERPL-MCNC: 0.71 MG/DL — SIGNIFICANT CHANGE UP (ref 0.5–1.3)
EOSINOPHIL # BLD AUTO: 0.08 K/UL — SIGNIFICANT CHANGE UP (ref 0–0.5)
EOSINOPHIL NFR BLD AUTO: 0.8 % — SIGNIFICANT CHANGE UP (ref 0–6)
GLUCOSE SERPL-MCNC: 117 MG/DL — HIGH (ref 70–99)
HCT VFR BLD CALC: 43.8 % — SIGNIFICANT CHANGE UP (ref 39–50)
HGB BLD-MCNC: 14.4 G/DL — SIGNIFICANT CHANGE UP (ref 13–17)
IANC: 7.47 K/UL — SIGNIFICANT CHANGE UP (ref 1.5–8.5)
IMM GRANULOCYTES NFR BLD AUTO: 0.4 % — SIGNIFICANT CHANGE UP (ref 0–1.5)
LYMPHOCYTES # BLD AUTO: 1.03 K/UL — SIGNIFICANT CHANGE UP (ref 1–3.3)
LYMPHOCYTES # BLD AUTO: 10.7 % — LOW (ref 13–44)
MAGNESIUM SERPL-MCNC: 1.9 MG/DL — SIGNIFICANT CHANGE UP (ref 1.6–2.6)
MCHC RBC-ENTMCNC: 30 PG — SIGNIFICANT CHANGE UP (ref 27–34)
MCHC RBC-ENTMCNC: 32.9 GM/DL — SIGNIFICANT CHANGE UP (ref 32–36)
MCV RBC AUTO: 91.3 FL — SIGNIFICANT CHANGE UP (ref 80–100)
MONOCYTES # BLD AUTO: 0.96 K/UL — HIGH (ref 0–0.9)
MONOCYTES NFR BLD AUTO: 10 % — SIGNIFICANT CHANGE UP (ref 2–14)
NEUTROPHILS # BLD AUTO: 7.47 K/UL — HIGH (ref 1.8–7.4)
NEUTROPHILS NFR BLD AUTO: 77.7 % — HIGH (ref 43–77)
NRBC # BLD: 0 /100 WBCS — SIGNIFICANT CHANGE UP
NRBC # FLD: 0 K/UL — SIGNIFICANT CHANGE UP
PHOSPHATE SERPL-MCNC: 2.3 MG/DL — LOW (ref 2.5–4.5)
PLATELET # BLD AUTO: 250 K/UL — SIGNIFICANT CHANGE UP (ref 150–400)
POTASSIUM SERPL-MCNC: 3.6 MMOL/L — SIGNIFICANT CHANGE UP (ref 3.5–5.3)
POTASSIUM SERPL-SCNC: 3.6 MMOL/L — SIGNIFICANT CHANGE UP (ref 3.5–5.3)
RBC # BLD: 4.8 M/UL — SIGNIFICANT CHANGE UP (ref 4.2–5.8)
RBC # FLD: 12.8 % — SIGNIFICANT CHANGE UP (ref 10.3–14.5)
SODIUM SERPL-SCNC: 144 MMOL/L — SIGNIFICANT CHANGE UP (ref 135–145)
WBC # BLD: 9.62 K/UL — SIGNIFICANT CHANGE UP (ref 3.8–10.5)
WBC # FLD AUTO: 9.62 K/UL — SIGNIFICANT CHANGE UP (ref 3.8–10.5)

## 2021-05-08 RX ADMIN — Medication 2 MILLIGRAM(S): at 03:37

## 2021-05-08 RX ADMIN — SODIUM CHLORIDE 3 MILLILITER(S): 9 INJECTION INTRAMUSCULAR; INTRAVENOUS; SUBCUTANEOUS at 06:00

## 2021-05-08 RX ADMIN — AMLODIPINE BESYLATE 5 MILLIGRAM(S): 2.5 TABLET ORAL at 06:00

## 2021-05-08 RX ADMIN — SERTRALINE 100 MILLIGRAM(S): 25 TABLET, FILM COATED ORAL at 12:59

## 2021-05-08 RX ADMIN — SODIUM CHLORIDE 3 MILLILITER(S): 9 INJECTION INTRAMUSCULAR; INTRAVENOUS; SUBCUTANEOUS at 21:01

## 2021-05-08 RX ADMIN — SODIUM CHLORIDE 3 MILLILITER(S): 9 INJECTION INTRAMUSCULAR; INTRAVENOUS; SUBCUTANEOUS at 13:08

## 2021-05-08 RX ADMIN — RISPERIDONE 2 MILLIGRAM(S): 4 TABLET ORAL at 13:19

## 2021-05-08 RX ADMIN — Medication 0.5 MILLIGRAM(S): at 04:50

## 2021-05-08 RX ADMIN — Medication 2 MILLIGRAM(S): at 21:26
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
## 2021-05-08 NOTE — PROVIDER CONTACT NOTE (OTHER) - RECOMMENDATIONS
Medicine ANN Porter made aware
Medicine ANN Porter made aware
prn ativan to be given
monitot pt, seizure precautions
additional ativan
monitot pt, seizure precautions

## 2021-05-08 NOTE — PROVIDER CONTACT NOTE (OTHER) - DATE AND TIME:
05-May-2021 00:56
07-May-2021 16:40
05-May-2021 01:30
05-May-2021 10:00
05-May-2021 02:13
05-May-2021 10:30
08-May-2021 04:50
04-May-2021 22:31
04-May-2021 23:31

## 2021-05-08 NOTE — PROVIDER CONTACT NOTE (OTHER) - ACTION/TREATMENT ORDERED:
Vitals reassesed as charted. ANN joe made aware, medication to be ordered as per German. Will continue to monitor.
Will order hydralazine.
ativan0.5mg given
5 mg hydralazine ordered
Medicine ANN Porter made aware, reassess vitals in 15minutes.
EEG ordered, CT head ordered, hydralazine ordered, sinus tach on EKG, pt placed on tele
EEG ordered, CT head ordered, hydralazine ordered, sinus tach on EKG, pt placed on tele
no further int at this time  hr went back sustaining in 1teens on tele following ativan adm
Awaiting further orders.

## 2021-05-08 NOTE — PROVIDER CONTACT NOTE (OTHER) - BACKGROUND
Patient admitted for altered mental status, has history of schizophrenia.
Patient admitted for change in mental status
pt adm for AMS w pmh of schizophrenia
pt admitted for altered mental status
schizophrenia
45Y M admitted for altered mental status and decreased functionality over the past months
45Y M admitted for altered mental status and decreased functionality over the past months
Patient admitted for altered mental status, has history of schizophrenia.
pt admitted for altered mental status

## 2021-05-08 NOTE — PROVIDER CONTACT NOTE (OTHER) - NAME OF MD/NP/PA/DO NOTIFIED:
Medicine ANN joe
ROMARIO Pandey
ACP PA Alida Gregorio
Tele ANN Emerson
Nava MENDOZA, ACP
ROMARIO Pandey
ADS
Tele ANN Emerson
Medicine ANN joe

## 2021-05-08 NOTE — CHART NOTE - NSCHARTNOTEFT_GEN_A_CORE
Patient agitated overnight, attempting to climb out of bed received ativan 2 mg IVP prn for agitation however, about an hour later patient still agitated attempting to climb out of bed will give additional ativan 0.5 mg ivp x 1

## 2021-05-09 LAB
ANION GAP SERPL CALC-SCNC: 12 MMOL/L — SIGNIFICANT CHANGE UP (ref 7–14)
BUN SERPL-MCNC: 18 MG/DL — SIGNIFICANT CHANGE UP (ref 7–23)
CALCIUM SERPL-MCNC: 9.2 MG/DL — SIGNIFICANT CHANGE UP (ref 8.4–10.5)
CHLORIDE SERPL-SCNC: 105 MMOL/L — SIGNIFICANT CHANGE UP (ref 98–107)
CO2 SERPL-SCNC: 26 MMOL/L — SIGNIFICANT CHANGE UP (ref 22–31)
CREAT SERPL-MCNC: 0.72 MG/DL — SIGNIFICANT CHANGE UP (ref 0.5–1.3)
GLUCOSE SERPL-MCNC: 95 MG/DL — SIGNIFICANT CHANGE UP (ref 70–99)
MAGNESIUM SERPL-MCNC: 1.9 MG/DL — SIGNIFICANT CHANGE UP (ref 1.6–2.6)
PHOSPHATE SERPL-MCNC: 2.5 MG/DL — SIGNIFICANT CHANGE UP (ref 2.5–4.5)
POTASSIUM SERPL-MCNC: 3.8 MMOL/L — SIGNIFICANT CHANGE UP (ref 3.5–5.3)
POTASSIUM SERPL-SCNC: 3.8 MMOL/L — SIGNIFICANT CHANGE UP (ref 3.5–5.3)
SODIUM SERPL-SCNC: 143 MMOL/L — SIGNIFICANT CHANGE UP (ref 135–145)

## 2021-05-09 RX ADMIN — RISPERIDONE 2 MILLIGRAM(S): 4 TABLET ORAL at 11:56

## 2021-05-09 RX ADMIN — SODIUM CHLORIDE 3 MILLILITER(S): 9 INJECTION INTRAMUSCULAR; INTRAVENOUS; SUBCUTANEOUS at 21:10

## 2021-05-09 RX ADMIN — AMLODIPINE BESYLATE 5 MILLIGRAM(S): 2.5 TABLET ORAL at 05:37

## 2021-05-09 RX ADMIN — SODIUM CHLORIDE 3 MILLILITER(S): 9 INJECTION INTRAMUSCULAR; INTRAVENOUS; SUBCUTANEOUS at 05:37

## 2021-05-09 RX ADMIN — SERTRALINE 100 MILLIGRAM(S): 25 TABLET, FILM COATED ORAL at 11:57

## 2021-05-09 NOTE — PROGRESS NOTE ADULT - PROBLEM SELECTOR PLAN 5
Patient on admissison with SBP in 180s.  Unable to obtain medical history.  Patient s/p hydralazine 2.5 mg IVP and 5 mg IVP. SBP down from 195 to 171.  Will continue to monitor for now  Patient denies headache.  Will start patient on amlodipine 5 mg.
-stable.c/w amlodipine

## 2021-05-09 NOTE — PROGRESS NOTE ADULT - PROBLEM SELECTOR PLAN 1
-BH f/u noted.EEG-no seizure activity
-BH f/u noted.EEG-no seizure activity
Likely worsening of schizophrenia.  No evidince of metabolic cause.  Doubt CT findings are causing pt's presentation  Patient oriented only to self. Not answering where he is and month of year.  Patient with flat affect.  Will call for psych consult.  Per OMR patient on Risperidone 4mg and Sertraline 100 mg. Will need to verify medications in AM.  Serum tox negative. Urine tox ordered.
-BH f/u noted.EEG-no seizure activity

## 2021-05-09 NOTE — PROGRESS NOTE ADULT - PROBLEM SELECTOR PLAN 2
CT head showed 2 x 2 mm indeterminate hyperdensity in the region of anterior third ventricle. Although this may be related to a tiny colloid cyst, small acute hemorrhage not excluded.   Neurosurgery consulted. Recs appreciated.  Repeat CT head Stable.  Neurosurgery f/u noted
CT head showed 2 x 2 mm indeterminate hyperdensity in the region of anterior third ventricle. Although this may be related to a tiny colloid cyst, small acute hemorrhage not excluded.   Neurosurgery consulted. Recs appreciated.  Repeat CT head Stable.  Per neurosurgery Patient would benefit from non urgent MRI brain +/- at some point to evaluate if colloid cyst is present.
CT head showed 2 x 2 mm indeterminate hyperdensity in the region of anterior third ventricle. Although this may be related to a tiny colloid cyst, small acute hemorrhage not excluded.   Neurosurgery consulted. Recs appreciated.  Repeat CT head Stable.  Neurosurgery f/u noted
CT head showed 2 x 2 mm indeterminate hyperdensity in the region of anterior third ventricle. Although this may be related to a tiny colloid cyst, small acute hemorrhage not excluded.   Neurosurgery consulted. Recs appreciated.  Repeat CT head Stable.  Neurosurgery f/u noted

## 2021-05-09 NOTE — PROGRESS NOTE ADULT - PROBLEM SELECTOR PLAN 8
Spoke to neurosurgery regarding Prophylaxis. Was told to hold off for now and call in AM for recommendation.

## 2021-05-09 NOTE — PHYSICAL THERAPY INITIAL EVALUATION ADULT - DISCHARGE DISPOSITION, PT EVAL
unable to make recommendation as patient does not consistently follow commands safely to improve identified impairments

## 2021-05-09 NOTE — PROGRESS NOTE ADULT - PROBLEM SELECTOR PLAN 4
WBC 11.36.  Patient afebrile but tachycardic.  Continue to trend.  Urinalysis ordered.
WBC 11.36.  Patient afebrile but tachycardic.  Continue to trend.  Urinalysis ordered.
-resolved.
WBC 11.36.  Patient afebrile but tachycardic.  Continue to trend.  Urinalysis ordered.

## 2021-05-09 NOTE — PROGRESS NOTE ADULT - REASON FOR ADMISSION
Change in mental status, schizophrenia

## 2021-05-09 NOTE — PROGRESS NOTE ADULT - ATTENDING COMMENTS
-EEG-no seizure activity  -psych f/u  -d/w NP
-EEG-no seizure activity
-EEG-no seizure activity  -psych f/u  -d/w NP
-EEG-no seizure activity
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
## 2021-05-09 NOTE — PROGRESS NOTE ADULT - SUBJECTIVE AND OBJECTIVE BOX
ELIZ HA  45y  Male      Patient is a 45y old  Male who presents with a chief complaint of Change in mental status, schizophrenia (08 May 2021 15:09)  calm,comfortable,confused,nad    REVIEW OF SYSTEMS:  CONSTITUTIONAL: No fever  RESPIRATORY: No cough, hemoptysis or shortness of breath  CARDIOVASCULAR: No chest pain, palpitations, dizziness, or leg swelling  GASTROINTESTINAL: No abdominal pain. nausea, vomiting, hematemesis  GENITOURINARY: No dysuria, frequency, hematuria   NEUROLOGICAL: No headaches, no dizziness  MUSCULOSKELETAL: No joint pain or swelling;     INTERVAL HPI/OVERNIGHT EVENTS:  T(C): 37.1 (05-09-21 @ 17:08), Max: 37.1 (05-09-21 @ 09:25)  HR: 96 (05-09-21 @ 17:08) (90 - 99)  BP: 148/84 (05-09-21 @ 17:08) (136/74 - 149/88)  RR: 16 (05-09-21 @ 17:08) (16 - 18)  SpO2: 98% (05-09-21 @ 17:08) (98% - 99%)  Wt(kg): --  I&O's Summary    T(C): 37.1 (05-09-21 @ 17:08), Max: 37.1 (05-09-21 @ 09:25)  HR: 96 (05-09-21 @ 17:08) (90 - 99)  BP: 148/84 (05-09-21 @ 17:08) (136/74 - 149/88)  RR: 16 (05-09-21 @ 17:08) (16 - 18)  SpO2: 98% (05-09-21 @ 17:08) (98% - 99%)  Wt(kg): --Vital Signs Last 24 Hrs  T(C): 37.1 (09 May 2021 17:08), Max: 37.1 (09 May 2021 09:25)  T(F): 98.7 (09 May 2021 17:08), Max: 98.7 (09 May 2021 09:25)  HR: 96 (09 May 2021 17:08) (90 - 99)  BP: 148/84 (09 May 2021 17:08) (136/74 - 149/88)  BP(mean): --  RR: 16 (09 May 2021 17:08) (16 - 18)  SpO2: 98% (09 May 2021 17:08) (98% - 99%)    LABS:                        14.4   9.62  )-----------( 250      ( 08 May 2021 06:59 )             43.8     05-09    143  |  105  |  18  ----------------------------<  95  3.8   |  26  |  0.72    Ca    9.2      09 May 2021 07:22  Phos  2.5     05-09  Mg     1.9     05-09          CAPILLARY BLOOD GLUCOSE                PAST MEDICAL & SURGICAL HISTORY:  Schizophrenia    Surgical history unknown        MEDICATIONS  (STANDING):  amLODIPine   Tablet 5 milliGRAM(s) Oral daily  risperiDONE   Tablet 2 milliGRAM(s) Oral daily  sertraline 100 milliGRAM(s) Oral daily  sodium chloride 0.9% lock flush 3 milliLiter(s) IV Push every 8 hours    MEDICATIONS  (PRN):  artificial tears (preservative free) Ophthalmic Solution 1 Drop(s) Both EYES two times a day PRN Dry Eyes  LORazepam   Injectable 2 milliGRAM(s) IV Push every 6 hours PRN Agitation        RADIOLOGY & ADDITIONAL TESTS:    Imaging Personally Reviewed:  [ ] YES  [ ] NO    Consultant(s) Notes Reviewed:  [x ] YES  [ ] NO    PHYSICAL EXAM:  GENERAL: Alert and awake lying in bed in no distress  HEAD:  Atraumatic, Normocephalic  EYES: EOMI, DANA, conjunctiva and sclera clear  NECK: Supple, No JVD, Normal thyroid  NERVOUS SYSTEM:  Alert & Oriented X1, Motor and sensory systems are intact,   CHEST/LUNG: Bilateral clear breath sounds, no rhochi, no wheezing, no crepitations,  HEART: Regular rate and rhythm; No murmurs, rubs, or gallops  ABDOMEN: Soft, Nontender, Nondistended; Bowel sounds present  EXTREMITIES:   Peripheral Pulses are palpable, no  edema        Care Discussed with Consultants/Other Providers [ x] YES  [ ] NO      Code Status: [] Full Code [] DNR [] DNI [] Goals of Care:   Disposition: [] ICU [] Stroke Unit [] RCU []PCU []Floor [] Discharge Home         TRIPP Negro    
LELAND ELIZ  45y  Male      Patient is a 45y old  Male who presents with a chief complaint of Change in mental status, schizophrenia (05 May 2021 11:30)  Patient seen and examined.chart reviewed.Agree with H and P.calm,comfortable,nad,no agitation.no vomiting,no fever    REVIEW OF SYSTEMS:  as above  INTERVAL HPI/OVERNIGHT EVENTS:  T(C): 36.8 (21 @ 15:58), Max: 37.2 (21 @ 05:56)  HR: 106 (21 @ 15:58) (104 - 112)  BP: 158/94 (21 @ 15:58) (158/94 - 174/94)  RR: 16 (21 @ 15:58) (16 - 18)  SpO2: 100% (21 @ 15:58) (97% - 100%)  Wt(kg): --  I&O's Summary    T(C): 36.8 (21 @ 15:58), Max: 37.2 (21 @ 05:56)  HR: 106 (21 @ 15:58) (104 - 112)  BP: 158/94 (21 @ 15:58) (158/94 - 174/94)  RR: 16 (21 @ 15:58) (16 - 18)  SpO2: 100% (21 @ 15:58) (97% - 100%)  Wt(kg): --Vital Signs Last 24 Hrs  T(C): 36.8 (06 May 2021 15:58), Max: 37.2 (06 May 2021 05:56)  T(F): 98.2 (06 May 2021 15:58), Max: 98.9 (06 May 2021 05:56)  HR: 106 (06 May 2021 15:58) (104 - 112)  BP: 158/94 (06 May 2021 15:58) (158/94 - 174/94)  BP(mean): --  RR: 16 (06 May 2021 15:58) (16 - 18)  SpO2: 100% (06 May 2021 15:58) (97% - 100%)    LABS:                        15.6   10.86 )-----------( 266      ( 06 May 2021 04:14 )             45.8     05-06    139  |  99  |  11  ----------------------------<  99  3.2<L>   |  24  |  0.76    Ca    9.0      06 May 2021 04:14  Phos  2.5     05-06  Mg     2.2     05-06        Urinalysis Basic - ( 06 May 2021 07:54 )    Color: Yellow / Appearance: Clear / S.028 / pH: x  Gluc: x / Ketone: Large  / Bili: Small / Urobili: 3 mg/dL   Blood: x / Protein: 30 mg/dL / Nitrite: Negative   Leuk Esterase: Negative / RBC: 2 /HPF / WBC 4 /HPF   Sq Epi: x / Non Sq Epi: 1 /HPF / Bacteria: Negative      CAPILLARY BLOOD GLUCOSE            Urinalysis Basic - ( 06 May 2021 07:54 )    Color: Yellow / Appearance: Clear / S.028 / pH: x  Gluc: x / Ketone: Large  / Bili: Small / Urobili: 3 mg/dL   Blood: x / Protein: 30 mg/dL / Nitrite: Negative   Leuk Esterase: Negative / RBC: 2 /HPF / WBC 4 /HPF   Sq Epi: x / Non Sq Epi: 1 /HPF / Bacteria: Negative        PAST MEDICAL & SURGICAL HISTORY:  Schizophrenia    Surgical history unknown        MEDICATIONS  (STANDING):  amLODIPine   Tablet 5 milliGRAM(s) Oral daily  sodium chloride 0.9% lock flush 3 milliLiter(s) IV Push every 8 hours    MEDICATIONS  (PRN):  artificial tears (preservative free) Ophthalmic Solution 1 Drop(s) Both EYES two times a day PRN Dry Eyes  LORazepam   Injectable 2 milliGRAM(s) IV Push every 6 hours PRN Agitation        RADIOLOGY & ADDITIONAL TESTS:    Imaging Personally Reviewed:  [ ] YES  [ ] NO    Consultant(s) Notes Reviewed:  [ ] YES  [ ] NO    PHYSICAL EXAM:  GENERAL: Alert and awake lying in bed in no distress  HEAD:  Atraumatic, Normocephalic  EYES: EOMI, DANA, conjunctiva and sclera clear  NECK: Supple, No JVD, Normal thyroid  NERVOUS SYSTEM:  Alert & Oriented X2, Motor and sensory systems are intact,   CHEST/LUNG: Bilateral clear breath sounds, no rhochi, no wheezing, no crepitations,  HEART: Regular rate and rhythm; No murmurs, rubs, or gallops  ABDOMEN: Soft, Nontender, Nondistended; Bowel sounds present  EXTREMITIES:   Peripheral Pulses are palpable, no  edema        Care Discussed with Consultants/Other Providers [ x] YES  [ ] NO      Code Status: [] Full Code [] DNR [] DNI [] Goals of Care:   Disposition: [] ICU [] Stroke Unit [] RCU []PCU []Floor [] Discharge Home         KARRI NegroP    
ELIZ HA  45y  Male      Patient is a 45y old  Male who presents with a chief complaint of Change in mental status, schizophrenia (06 May 2021 21:51)  Patient is comfortable,nad,no seizure activity.no sob,no cp,no fever,no cough    REVIEW OF SYSTEMS:  as aboveINTERVAL HPI/OVERNIGHT EVENTS:  T(C): 36.7 (21 @ 17:16), Max: 37.2 (21 @ 05:01)  HR: 115 (21 @ 17:16) (105 - 140)  BP: 150/104 (21 @ 17:16) (150/104 - 176/99)  RR: 17 (21 @ 17:16) (17 - 19)  SpO2: 96% (21 @ 17:16) (96% - 97%)  Wt(kg): --  I&O's Summary    T(C): 36.7 (21 @ 17:16), Max: 37.2 (21 @ 05:01)  HR: 115 (21 @ 17:16) (105 - 140)  BP: 150/104 (21 @ 17:16) (150/104 - 176/99)  RR: 17 (21 @ 17:16) (17 - 19)  SpO2: 96% (21 @ 17:16) (96% - 97%)  Wt(kg): --Vital Signs Last 24 Hrs  T(C): 36.7 (07 May 2021 17:16), Max: 37.2 (07 May 2021 05:01)  T(F): 98.1 (07 May 2021 17:16), Max: 98.9 (07 May 2021 05:01)  HR: 115 (07 May 2021 17:16) (105 - 140)  BP: 150/104 (07 May 2021 17:16) (150/104 - 176/99)  BP(mean): --  RR: 17 (07 May 2021 17:16) (17 - 19)  SpO2: 96% (07 May 2021 17:16) (96% - 97%)    LABS:                        15.6   10.86 )-----------( 266      ( 06 May 2021 04:14 )             45.8     05-07    140  |  102  |  16  ----------------------------<  106<H>  3.4<L>   |  24  |  0.75    Ca    9.4      07 May 2021 03:29  Phos  1.8     05-07  Mg     2.1     05-07        Urinalysis Basic - ( 06 May 2021 07:54 )    Color: Yellow / Appearance: Clear / S.028 / pH: x  Gluc: x / Ketone: Large  / Bili: Small / Urobili: 3 mg/dL   Blood: x / Protein: 30 mg/dL / Nitrite: Negative   Leuk Esterase: Negative / RBC: 2 /HPF / WBC 4 /HPF   Sq Epi: x / Non Sq Epi: 1 /HPF / Bacteria: Negative      CAPILLARY BLOOD GLUCOSE            Urinalysis Basic - ( 06 May 2021 07:54 )    Color: Yellow / Appearance: Clear / S.028 / pH: x  Gluc: x / Ketone: Large  / Bili: Small / Urobili: 3 mg/dL   Blood: x / Protein: 30 mg/dL / Nitrite: Negative   Leuk Esterase: Negative / RBC: 2 /HPF / WBC 4 /HPF   Sq Epi: x / Non Sq Epi: 1 /HPF / Bacteria: Negative        PAST MEDICAL & SURGICAL HISTORY:  Schizophrenia    Surgical history unknown        MEDICATIONS  (STANDING):  amLODIPine   Tablet 5 milliGRAM(s) Oral daily  risperiDONE   Tablet 2 milliGRAM(s) Oral daily  sertraline 100 milliGRAM(s) Oral daily  sodium chloride 0.9% lock flush 3 milliLiter(s) IV Push every 8 hours    MEDICATIONS  (PRN):  artificial tears (preservative free) Ophthalmic Solution 1 Drop(s) Both EYES two times a day PRN Dry Eyes  LORazepam   Injectable 2 milliGRAM(s) IV Push every 6 hours PRN Agitation        RADIOLOGY & ADDITIONAL TESTS:    Imaging Personally Reviewed:  [ ] YES  [ ] NO    Consultant(s) Notes Reviewed:  [ x] YES  [ ] NO    PHYSICAL EXAM:  GENERAL: Alert and awake lying in bed in no distress  HEAD:  Atraumatic, Normocephalic  EYES: EOMI, DANA, conjunctiva and sclera clear  NECK: Supple, No JVD, Normal thyroid  NERVOUS SYSTEM:  Alert & Oriented X3, Motor and sensory systems are intact,   CHEST/LUNG: Bilateral clear breath sounds, no rhochi, no wheezing, no crepitations,  HEART: Regular rate and rhythm; No murmurs, rubs, or gallops  ABDOMEN: Soft, Nontender, Nondistended; Bowel sounds present  EXTREMITIES:   Peripheral Pulses are palpable, no  edema        Care Discussed with Consultants/Other Providers [ ] YES  [ ] NO      Code Status: [] Full Code [] DNR [] DNI [] Goals of Care:   Disposition: [] ICU [] Stroke Unit [] RCU []PCU []Floor [] Discharge Home         ALEJANDRA Negro.FACP    
ELIZ HA  45y  Male      Patient is a 45y old  Male who presents with a chief complaint of Change in mental status, schizophrenia (07 May 2021 19:51)  Above noted.Agitated at times    REVIEW OF SYSTEMS:  CONSTITUTIONAL: No fever  RESPIRATORY: No cough, hemoptysis or shortness of breath  CARDIOVASCULAR: No chest pain, palpitations, dizziness, or leg swelling  GASTROINTESTINAL: No abdominal pain. nausea, vomiting, hematemesis  GENITOURINARY: No dysuria, frequency, hematuria   NEUROLOGICAL: No headaches, no dizziness  MUSCULOSKELETAL: No joint pain or swelling;     INTERVAL HPI/OVERNIGHT EVENTS:  T(C): 36.7 (05-08-21 @ 13:01), Max: 37 (05-08-21 @ 09:30)  HR: 110 (05-08-21 @ 13:01) (80 - 140)  BP: 148/88 (05-08-21 @ 13:01) (140/90 - 150/104)  RR: 17 (05-08-21 @ 13:01) (17 - 18)  SpO2: 98% (05-08-21 @ 13:01) (96% - 98%)  Wt(kg): --  I&O's Summary    T(C): 36.7 (05-08-21 @ 13:01), Max: 37 (05-08-21 @ 09:30)  HR: 110 (05-08-21 @ 13:01) (80 - 140)  BP: 148/88 (05-08-21 @ 13:01) (140/90 - 150/104)  RR: 17 (05-08-21 @ 13:01) (17 - 18)  SpO2: 98% (05-08-21 @ 13:01) (96% - 98%)  Wt(kg): --Vital Signs Last 24 Hrs  T(C): 36.7 (08 May 2021 13:01), Max: 37 (08 May 2021 09:30)  T(F): 98 (08 May 2021 13:01), Max: 98.6 (08 May 2021 09:30)  HR: 110 (08 May 2021 13:01) (80 - 140)  BP: 148/88 (08 May 2021 13:01) (140/90 - 150/104)  BP(mean): --  RR: 17 (08 May 2021 13:01) (17 - 18)  SpO2: 98% (08 May 2021 13:01) (96% - 98%)    LABS:                        14.4   9.62  )-----------( 250      ( 08 May 2021 06:59 )             43.8     05-08    144  |  106  |  18  ----------------------------<  117<H>  3.6   |  25  |  0.71    Ca    9.3      08 May 2021 06:59  Phos  2.3     05-08  Mg     1.9     05-08          CAPILLARY BLOOD GLUCOSE                PAST MEDICAL & SURGICAL HISTORY:  Schizophrenia    Surgical history unknown        MEDICATIONS  (STANDING):  amLODIPine   Tablet 5 milliGRAM(s) Oral daily  risperiDONE   Tablet 2 milliGRAM(s) Oral daily  sertraline 100 milliGRAM(s) Oral daily  sodium chloride 0.9% lock flush 3 milliLiter(s) IV Push every 8 hours    MEDICATIONS  (PRN):  artificial tears (preservative free) Ophthalmic Solution 1 Drop(s) Both EYES two times a day PRN Dry Eyes  LORazepam   Injectable 2 milliGRAM(s) IV Push every 6 hours PRN Agitation        RADIOLOGY & ADDITIONAL TESTS:    Imaging Personally Reviewed:  [ ] YES  [ ] NO    Consultant(s) Notes Reviewed:  [ ] YES  [ ] NO    PHYSICAL EXAM:  GENERAL: Alert and awake lying in bed in no distress  HEAD:  Atraumatic, Normocephalic  EYES: EOMI, DANA, conjunctiva and sclera clear  NECK: Supple, No JVD, Normal thyroid  NERVOUS SYSTEM:  Alert & Oriented X3, Motor and sensory systems are intact,   CHEST/LUNG: Bilateral clear breath sounds, no rhochi, no wheezing, no crepitations,  HEART: Regular rate and rhythm; No murmurs, rubs, or gallops  ABDOMEN: Soft, Nontender, Nondistended; Bowel sounds present  EXTREMITIES:   Peripheral Pulses are palpable, no  edema        Care Discussed with Consultants/Other Providers [ ] YES  [ ] NO      Code Status: [] Full Code [] DNR [] DNI [] Goals of Care:   Disposition: [] ICU [] Stroke Unit [] RCU []PCU []Floor [] Discharge Home         FEDERICO NegroHarborview Medical CenterP

## 2021-05-10 ENCOUNTER — TRANSCRIPTION ENCOUNTER (OUTPATIENT)
Age: 46
End: 2021-05-10

## 2021-05-10 ENCOUNTER — INPATIENT (INPATIENT)
Facility: HOSPITAL | Age: 46
LOS: 69 days | Discharge: HOME | End: 2021-07-19
Attending: PSYCHIATRY & NEUROLOGY | Admitting: PSYCHIATRY & NEUROLOGY
Payer: MEDICARE

## 2021-05-10 VITALS
TEMPERATURE: 98 F | HEART RATE: 92 BPM | DIASTOLIC BLOOD PRESSURE: 74 MMHG | SYSTOLIC BLOOD PRESSURE: 146 MMHG | RESPIRATION RATE: 17 BRPM | OXYGEN SATURATION: 99 %

## 2021-05-10 DIAGNOSIS — F33.9 MAJOR DEPRESSIVE DISORDER, RECURRENT, UNSPECIFIED: ICD-10-CM

## 2021-05-10 DIAGNOSIS — Z78.9 OTHER SPECIFIED HEALTH STATUS: Chronic | ICD-10-CM

## 2021-05-10 PROBLEM — F20.9 SCHIZOPHRENIA, UNSPECIFIED: Chronic | Status: ACTIVE | Noted: 2021-05-05

## 2021-05-10 LAB
ANION GAP SERPL CALC-SCNC: 13 MMOL/L — SIGNIFICANT CHANGE UP (ref 7–14)
BUN SERPL-MCNC: 18 MG/DL — SIGNIFICANT CHANGE UP (ref 7–23)
CALCIUM SERPL-MCNC: 9.2 MG/DL — SIGNIFICANT CHANGE UP (ref 8.4–10.5)
CHLORIDE SERPL-SCNC: 104 MMOL/L — SIGNIFICANT CHANGE UP (ref 98–107)
CO2 SERPL-SCNC: 28 MMOL/L — SIGNIFICANT CHANGE UP (ref 22–31)
CREAT SERPL-MCNC: 0.73 MG/DL — SIGNIFICANT CHANGE UP (ref 0.5–1.3)
GLUCOSE SERPL-MCNC: 96 MG/DL — SIGNIFICANT CHANGE UP (ref 70–99)
MAGNESIUM SERPL-MCNC: 1.9 MG/DL — SIGNIFICANT CHANGE UP (ref 1.6–2.6)
PHOSPHATE SERPL-MCNC: 3.3 MG/DL — SIGNIFICANT CHANGE UP (ref 2.5–4.5)
POTASSIUM SERPL-MCNC: 3.5 MMOL/L — SIGNIFICANT CHANGE UP (ref 3.5–5.3)
POTASSIUM SERPL-SCNC: 3.5 MMOL/L — SIGNIFICANT CHANGE UP (ref 3.5–5.3)
SARS-COV-2 RNA SPEC QL NAA+PROBE: SIGNIFICANT CHANGE UP
SODIUM SERPL-SCNC: 145 MMOL/L — SIGNIFICANT CHANGE UP (ref 135–145)
VIT B1 SERPL-MCNC: 141.5 NMOL/L — SIGNIFICANT CHANGE UP (ref 66.5–200)

## 2021-05-10 PROCEDURE — 99233 SBSQ HOSP IP/OBS HIGH 50: CPT

## 2021-05-10 PROCEDURE — 99223 1ST HOSP IP/OBS HIGH 75: CPT

## 2021-05-10 RX ORDER — SERTRALINE 25 MG/1
1 TABLET, FILM COATED ORAL
Qty: 0 | Refills: 0 | DISCHARGE
Start: 2021-05-10

## 2021-05-10 RX ORDER — RISPERIDONE 4 MG/1
1 TABLET ORAL
Qty: 0 | Refills: 0 | DISCHARGE
Start: 2021-05-10

## 2021-05-10 RX ORDER — HALOPERIDOL DECANOATE 100 MG/ML
5 INJECTION INTRAMUSCULAR EVERY 6 HOURS
Refills: 0 | Status: DISCONTINUED | OUTPATIENT
Start: 2021-05-10 | End: 2021-07-19

## 2021-05-10 RX ORDER — AMLODIPINE BESYLATE 2.5 MG/1
1 TABLET ORAL
Qty: 0 | Refills: 0 | DISCHARGE
Start: 2021-05-10

## 2021-05-10 RX ORDER — DIPHENHYDRAMINE HCL 50 MG
50 CAPSULE ORAL EVERY 6 HOURS
Refills: 0 | Status: DISCONTINUED | OUTPATIENT
Start: 2021-05-10 | End: 2021-07-19

## 2021-05-10 RX ORDER — POTASSIUM CHLORIDE 20 MEQ
20 PACKET (EA) ORAL ONCE
Refills: 0 | Status: COMPLETED | OUTPATIENT
Start: 2021-05-10 | End: 2021-05-10

## 2021-05-10 RX ORDER — HALOPERIDOL DECANOATE 100 MG/ML
5 INJECTION INTRAMUSCULAR ONCE
Refills: 0 | Status: DISCONTINUED | OUTPATIENT
Start: 2021-05-10 | End: 2021-07-19

## 2021-05-10 RX ORDER — SERTRALINE 25 MG/1
100 TABLET, FILM COATED ORAL DAILY
Refills: 0 | Status: DISCONTINUED | OUTPATIENT
Start: 2021-05-10 | End: 2021-07-19

## 2021-05-10 RX ORDER — RISPERIDONE 4 MG/1
2 TABLET ORAL DAILY
Refills: 0 | Status: DISCONTINUED | OUTPATIENT
Start: 2021-05-10 | End: 2021-05-18

## 2021-05-10 RX ORDER — DIPHENHYDRAMINE HCL 50 MG
50 CAPSULE ORAL ONCE
Refills: 0 | Status: DISCONTINUED | OUTPATIENT
Start: 2021-05-10 | End: 2021-07-19

## 2021-05-10 RX ORDER — AMLODIPINE BESYLATE 2.5 MG/1
5 TABLET ORAL DAILY
Refills: 0 | Status: DISCONTINUED | OUTPATIENT
Start: 2021-05-10 | End: 2021-07-07

## 2021-05-10 RX ADMIN — Medication 20 MILLIEQUIVALENT(S): at 10:00

## 2021-05-10 RX ADMIN — RISPERIDONE 2 MILLIGRAM(S): 4 TABLET ORAL at 10:36

## 2021-05-10 RX ADMIN — AMLODIPINE BESYLATE 5 MILLIGRAM(S): 2.5 TABLET ORAL at 05:27

## 2021-05-10 RX ADMIN — SERTRALINE 100 MILLIGRAM(S): 25 TABLET, FILM COATED ORAL at 10:01

## 2021-05-10 NOTE — DISCHARGE NOTE PROVIDER - CARE PROVIDERS DIRECT ADDRESSES
,DirectAddress_Unknown,DirectAddress_Unknown,[email or web address]

## 2021-05-10 NOTE — DISCHARGE NOTE PROVIDER - CARE PROVIDER_API CALL
Stephane Negro)  Internal Medicine  [address] [place] [address]  Phone: [phone]  Fax: [phone]  Established Patient  Follow Up Time: 1 week    Primary Care Physician,   Please call and schedule outpatient follow up appt in 1-2 weeks after discharge  Phone: (   )    -  Fax: (   )    -  Follow Up Time:     Sherrell Tello)  [facility] Neurosurgery  [address], [facility] [address]  [place] [address]  Phone: [phone]  Fax: [phone]  Established Patient  Follow Up Time: 1 month
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
## 2021-05-10 NOTE — BH INPATIENT PSYCHIATRY ASSESSMENT NOTE - MSE UNSTRUCTURED FT
45 year old man who appears significantly older than his current age, with poor grooming/hygeine including very poor dentition, minimally able to participate in interview, laying in bed staring at ceiling, occasionally sitting up and taking off hospital gown (vs. letting hospital gown fall off), speech soft, slow and latent, TP impoverished vs internally preoccupied, TC: denies SI/HI, wants to go home, no apparent delusional content, Perceptions: no apparent AVH, cognition: unable to assess, gait is unsteady. Insight and judgement are poor, impulse control is poor.
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
## 2021-05-10 NOTE — DISCHARGE NOTE PROVIDER - PROVIDER TOKENS
PROVIDER:[TOKEN:[[id number]],FOLLOWUP:[1 week],ESTABLISHEDPATIENT:[T]],FREE:[LAST:[Primary Care Physician],PHONE:[(   )    -],FAX:[(   )    -],ADDRESS:[Please call and schedule outpatient follow up appt in 1-2 weeks after discharge]],PROVIDER:[TOKEN:[[id number]],FOLLOWUP:[1 month],ESTABLISHEDPATIENT:[T]]

## 2021-05-10 NOTE — BH INPATIENT PSYCHIATRY ASSESSMENT NOTE - NSBHCHARTREVIEWVS_PSY_A_CORE FT
Vital Signs Last 24 Hrs  T(C): 36.6 (10 May 2021 08:26), Max: 36.9 (09 May 2021 21:45)  T(F): 97.8 (10 May 2021 08:26), Max: 98.5 (09 May 2021 21:45)  HR: 92 (10 May 2021 08:26) (92 - 99)  BP: 146/74 (10 May 2021 08:26) (145/70 - 152/72)  BP(mean): --  RR: 17 (10 May 2021 08:26) (15 - 17)  SpO2: 99% (10 May 2021 08:26) (98% - 99%)

## 2021-05-10 NOTE — BH INPATIENT PSYCHIATRY ASSESSMENT NOTE - NSBHMETABOLIC_PSY_ALL_CORE_FT
BMI: BMI (kg/m2): 20.9 (05-05-21 @ 01:25)  HbA1c: A1C with Estimated Average Glucose Result: 5.4 % (05-06-21 @ 04:14)    Glucose: POCT Blood Glucose.: 134 mg/dL (05-05-21 @ 10:04)    BP: --  Lipid Panel: Date/Time: 05-06-21 @ 04:14  Cholesterol, Serum: 151  Direct LDL: --  HDL Cholesterol, Serum: 59  Total Cholesterol/HDL Ration Measurement: --  Triglycerides, Serum: 81

## 2021-05-10 NOTE — BH CONSULTATION LIAISON PROGRESS NOTE - NSBHFUPINTERVALHXFT_PSY_A_CORE
Chart reviewed. Patient sitting up in bed today, withdrawn and not making eye contact. Patient AOx1, disoriented to time and place. Patient demonstrated some insight into hospitalization today for the first time, stating "I was acting strangely." Patient states that he knows he was eating less at home, walking and talking less. Patient maintains his flat, anxious/fearful affect with some irritability noted today. Disorganized process with poverty of speech and thought, though mild improvement noted on eval today. Patient states decreased appetite and poor sleep. States very adamantly that he does not want to be here and wants to go home. States that his home life is "fine," he lives with his mom and dad. States that he is not feeling well and that his stomach, arms and legs, hurt. Ambulation was encouraged, as the patient states that he has not walked around at all but denies thinking he is unable to ambulate. Denies AH/VH, SI/HI. No PRNs x 24 hours. Patient to be discharged to [facility] today.

## 2021-05-10 NOTE — DISCHARGE NOTE PROVIDER - NSCORESITESY/N_GEN_A_CORE_RD
Problem: Mobility Impaired (Adult and Pediatric) Goal: *Acute Goals and Plan of Care (Insert Text) Description Physical Therapy Goals Initiated 3/25/2019 1. Patient will move from supine to sit and sit to supine , scoot up and down and roll side to side in bed with modified independence within 4 days. 2. Patient will perform sit to stand with modified independence within 4 days. 3. Patient will ambulate with modified independence for 150 feet with the least restrictive device within 4 days. 4. Patient will ascend/descend 14 stairs with one handrail(s) with minimal assistance/contact guard assist within 4 days. 5. Patient will perform home exercise program per protocol with independence within 4 days. 6. Patient will demonstrate AROM 0-90 degrees in operative joint within 4 days. Outcome: Progressing Towards Goal 
 PHYSICAL THERAPY KNEE EVALUATION Patient: Kei Redding (56 y.o. 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 
REVISION LEFT TOTAL KNEE ARTHROPLASTY (Left) Day of Surgery Precautions: L LE 25% WB, fall, contact (h/o MRSA) ASSESSMENT : 
Based on the objective data described below, the patient presents with L knee pain 6/10, L LE weakness, decreased range of motion and decreased mobility after L TKA revision (secondary to infection and prosthetic failure). Pt ambulated about 20 feet with rolling walker and minimal assist x2 with verbal cues for sequence and L quad control. Pt was instructed in ankle pumps and in fall prevention. Pt is well-motivated and should progress well. Patient will benefit from skilled intervention to address the above impairments. Patient?s rehabilitation potential is considered to be Good Factors which may influence rehabilitation potential include:  
? None noted ? Mental ability/status ?         Medical condition ? Home/family situation and support systems ? Safety awareness 
? Pain tolerance/management 
? Other: PLAN : 
Recommendations and Planned Interventions: 
?           Bed Mobility Training             ? Neuromuscular Re-Education ? Transfer Training                   ? Orthotic/Prosthetic Training 
? Gait Training                         ? Modalities ? Therapeutic Exercises           ? Edema Management/Control ? Therapeutic Activities            ? Patient and Family Training/Education ? Other (comment): Frequency/Duration: Patient will be followed by physical therapy daily to address goals. Discharge Recommendations: Home Health Further Equipment Recommendations for Discharge: none SUBJECTIVE:  
Patient stated ? Its amazing how this surgery has changed. ? OBJECTIVE DATA SUMMARY:  
HISTORY:   
Past Medical History:  
Diagnosis Date Allergic rhinitis Asthma   
 allergy/URI related Colon polyp 11/13/13 Diverticulosis GERD (gastroesophageal reflux disease) Hiatal hernia History of vascular access device 11/21/2018  
 5 Fr double PICC 43 cm 2 cm out, long term abx, R basilic Hypercholesterolemia IGT (impaired glucose tolerance) Kidney stone 2016  
 x2 Overweight (BMI 25.0-29.9) 11/12/2018 Rheumatoid arthritis(714.0) S/P PICC central line placement 11/09/2017  
 has been removed UC (ulcerative colitis) ([facility] In remission Past Surgical History:  
Procedure Laterality Date ABDOMEN SURGERY PROC UNLISTED Bilateral 2007 Hernia Repair [address] [place] [address] UNLISTED Left 1971 Inquinal hernia HX GI  2010 & 2016 COLONOSCOPY HX KNEE ARTHROSCOPY Left 10/2017 HX LUMBAR DISKECTOMY N/A 2012  
 herniated disk HX OPEN REDUCTION INTERNAL FIXATION Left 07/2017 Tibia fx HX TONSILLECTOMY REMOVAL OF IMPLANT MATERIAL Left 11/2017 IND with plate removal of tibia Prior Level of Function/Home Situation: Pt was an independent community distance ambulator, was independent in ADL's, and has not fallen in the past year. Personal factors and/or comorbidities impacting plan of care: RA, asthma, ulcerative colitis Home Situation Home Environment: Private residence # Steps to Enter: 0 One/Two Story Residence: [facility] level # of Interior Steps: 15 Lift Chair Available: No 
Living Alone: No 
Support Systems: Family member(s) Patient Expects to be Discharged to[de-identified] Private residence Current DME Used/Available at Home: Crutches, Walker, rolling, Cane, straight, Shower chair Tub or Shower Type: Shower EXAMINATION/PRESENTATION/DECISION MAKING:  
Critical Behavior: 
Neurologic State: Alert Orientation Level: Oriented X4 Cognition: Appropriate decision making Hearing: Auditory Auditory Impairment: None Skin:  ace wrap in place Range Of Motion: 
AROM: Within functional limits(L LE limited after surgery) LLE AROM 
L Knee Flexion: 80 L Knee Extension: 20 Strength:   
Strength: Within functional limits(L LE limited aftre surgery) Tone & Sensation:  
  
  
  
  
  
Sensation: Intact Coordination: 
  
Vision:  
  
Functional Mobility: 
Bed Mobility: 
  
Supine to Sit: Additional time;Assist x1;Minimum assistance Sit to Supine: Additional time;Assist x1;Minimum assistance Scooting: Supervision Transfers: 
Sit to Stand: Assist x2;Contact guard assistance Stand to Sit: Assist x1;Contact guard assistance Balance:  
Sitting: Intact Standing: Impaired Standing - Static: Fair Standing - Dynamic : Fair Ambulation/Gait Training: 
Distance (ft): 20 Feet (ft) Assistive Device: Gait belt;Walker, rolling Ambulation - Level of Assistance: Assist x2;Minimal assistance Gait Description (WDL): Exceptions to [facility] Gait Abnormalities: Step to gait Left Side Weight Bearing: Partial (%)(25%) Stance: Left decreased Speed/Jocy: Pace decreased (<100 feet/min) Step Length: Right shortened;Left shortened Stairs: Therapeutic Exercises:  
Instructed in ankle pumps and encouraged to exercise hourly Functional Measure: 
Barthel Index: 
Bathin Bladder: 5 Bowels: 10 
Groomin Dressin Feeding: 10 Mobility: 5 Stairs: 0 Toilet Use: 5 Transfer (Bed to Chair and Back): 5 Total: 50/100 Percentage of impairment  
0% 1-19% 20-39% 40-59% 60-79% 80-99% 100% Barthel Score 0-100 100 99-80 79-60 59-40 20-39 1-19 
 0 The Barthel ADL Index: Guidelines 1. The index should be used as a record of what a patient does, not as a record of what a patient could do. 2. The main aim is to establish degree of independence from any help, physical or verbal, however minor and for whatever reason. 3. The need for supervision renders the patient not independent. 4. A patient's performance should be established using the best available evidence. Asking the patient, friends/relatives and nurses are the usual sources, but direct observation and common sense are also important. However direct testing is not needed. 5. Usually the patient's performance over the preceding 24-48 hours is important, but occasionally longer periods will be relevant. 6. Middle categories imply that the patient supplies over 50 per cent of the effort. 7. Use of aids to be independent is allowed. Pilar Brooks., Barthel, D.W. ([id number]). Functional evaluation: the Barthel Index. [address] [facility] (14)2. DANIELLE Islas, Wanda Farooq., Alyssa Scott., [place], [address] [place] [address] Ave (). Measuring the change indisability after inpatient rehabilitation; comparison of the responsiveness of the Barthel Index and Functional [place] Measure. Journal of Neurology, Neurosurgery, and Psychiatry, 66(4), 004-914. ELIESER Camarena, RANDY Moses, & José Manuel Ortiz M.A. (2004.) Assessment of post-stroke quality of life in cost-effectiveness studies: The usefulness of the Barthel Index and the EuroQoL-5D. [facility], 13, 098-50 Physical Therapy Evaluation Charge Determination History Examination Presentation Decision-Making MEDIUM  Complexity : 1-2 comorbidities / personal factors will impact the outcome/ POC  LOW Complexity : 1-2 Standardized tests and measures addressing body structure, function, activity limitation and / or participation in recreation  LOW Complexity : Stable, uncomplicated  Other outcome measures Barthel  MEDIUM Based on the above components, the patient evaluation is determined to be of the following complexity level: LOW Pain: 
Pain Scale 1: Numeric (0 - 10) Pain Intensity 1: 0 Activity Tolerance:  
good Please refer to the flowsheet for vital signs taken during this treatment. After treatment:  
?         Patient left in no apparent distress sitting up in chair ? Patient left in no apparent distress in bed 
? Call bell left within reach ? Nursing notified ? Caregiver present ? Bed alarm activated COMMUNICATION/EDUCATION:  
The patient?s plan of care was discussed with: Registered Nurse. ?         Fall prevention education was provided and the patient/caregiver indicated understanding. ? Patient/family have participated as able in goal setting and plan of care. ?         Patient/family agree to work toward stated goals and plan of care. ?         Patient understands intent and goals of therapy, but is neutral about his/her participation. ? Patient is unable to participate in goal setting and plan of care. Thank you for this referral. 
Raffi Marquis, PT Time Calculation: 27 mins No

## 2021-05-10 NOTE — BH CONSULTATION LIAISON PROGRESS NOTE - NSBHATTENDATTEST_PSY_ALL_CORE
Please read message below.  
I have personally seen, examined and participated in the care of this patient. I have reviewed all pertinent clinical information, including history, physical exam, plan and the Medical/PA/NP Student’s note and agree except as noted.
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
## 2021-05-10 NOTE — BH CONSULTATION LIAISON PROGRESS NOTE - CURRENT MEDICATION
MEDICATIONS  (STANDING):  amLODIPine   Tablet 5 milliGRAM(s) Oral daily  potassium phosphate IVPB 15 milliMole(s) IV Intermittent once  sodium chloride 0.9% lock flush 3 milliLiter(s) IV Push every 8 hours    MEDICATIONS  (PRN):  artificial tears (preservative free) Ophthalmic Solution 1 Drop(s) Both EYES two times a day PRN Dry Eyes  LORazepam   Injectable 2 milliGRAM(s) IV Push every 6 hours PRN Agitation  
MEDICATIONS  (STANDING):  amLODIPine   Tablet 5 milliGRAM(s) Oral daily  sodium chloride 0.9% lock flush 3 milliLiter(s) IV Push every 8 hours    MEDICATIONS  (PRN):  artificial tears (preservative free) Ophthalmic Solution 1 Drop(s) Both EYES two times a day PRN Dry Eyes  
MEDICATIONS  (STANDING):  amLODIPine   Tablet 5 milliGRAM(s) Oral daily  risperiDONE   Tablet 2 milliGRAM(s) Oral daily  sertraline 100 milliGRAM(s) Oral daily  sodium chloride 0.9% lock flush 3 milliLiter(s) IV Push every 8 hours    MEDICATIONS  (PRN):  artificial tears (preservative free) Ophthalmic Solution 1 Drop(s) Both EYES two times a day PRN Dry Eyes  LORazepam   Injectable 2 milliGRAM(s) IV Push every 6 hours PRN Agitation

## 2021-05-10 NOTE — BH INPATIENT PSYCHIATRY ASSESSMENT NOTE - NSDCCRITERIA_PSY_ALL_CORE
Improvement in symptoms to point at which patient is able to care for self and is no longer at risk of harm to self.

## 2021-05-10 NOTE — BH INPATIENT PSYCHIATRY ASSESSMENT NOTE - MODIFICATIONS
Patient seen laying in bed, staring at the ceiling intensly, appears to be responding to internal stimuli.   There is poverty of content in his speech and it is slowed and monotone.   He denies si/hi with no I/i/p.    He keeps undressing in his room and needs frequent redirection.  He has unsteady gait and attempting to get out of bed frequently and does not understand that hewill need pt eval prior to ambulating on his own.  fall precuations and ambulate with assistance

## 2021-05-10 NOTE — BH CONSULTATION LIAISON PROGRESS NOTE - NSBHCHARTREVIEWVS_PSY_A_CORE FT
Vital Signs Last 24 Hrs  T(C): 36.9 (06 May 2021 11:50), Max: 37.2 (05 May 2021 20:47)  T(F): 98.4 (06 May 2021 11:50), Max: 98.9 (05 May 2021 20:47)  HR: 109 (06 May 2021 11:50) (103 - 112)  BP: 165/95 (06 May 2021 11:50) (157/95 - 175/106)  BP(mean): --  RR: 17 (06 May 2021 11:50) (17 - 18)  SpO2: 100% (06 May 2021 11:50) (97% - 100%)
Vital Signs Last 24 Hrs  T(C): 37.2 (07 May 2021 05:01), Max: 37.2 (07 May 2021 05:01)  T(F): 98.9 (07 May 2021 05:01), Max: 98.9 (07 May 2021 05:01)  HR: 105 (07 May 2021 05:01) (105 - 109)  BP: 176/99 (07 May 2021 05:01) (158/93 - 176/99)  BP(mean): --  RR: 19 (07 May 2021 05:01) (16 - 19)  SpO2: 96% (07 May 2021 05:01) (96% - 100%)
Vital Signs Last 24 Hrs  T(C): 36.6 (10 May 2021 08:26), Max: 37.1 (09 May 2021 17:08)  T(F): 97.8 (10 May 2021 08:26), Max: 98.7 (09 May 2021 17:08)  HR: 92 (10 May 2021 08:26) (92 - 99)  BP: 146/74 (10 May 2021 08:26) (145/70 - 152/72)  BP(mean): --  RR: 17 (10 May 2021 08:26) (15 - 17)  SpO2: 99% (10 May 2021 08:26) (98% - 99%)

## 2021-05-10 NOTE — BH CONSULTATION LIAISON PROGRESS NOTE - OTHER
Patient withdrawn, appears scared. Fairly cooperative with interview.
Patient withdrawn, appears scared. Fairly uncooperative with interview. 
Patient mostly cooperative, but irritable when talking about hospitalization. Patient withdrawn, appears scared/anxious.

## 2021-05-10 NOTE — BH CONSULTATION LIAISON PROGRESS NOTE - NSBHCONSULTRECOMMENDOTHER_PSY_A_CORE FT
-Blood pressure better controlled over weekend. Appreciate neuro recs. EEG results normal with no seizure activity and repeat CT Head shows no acute issues.   -Monitor blood pressures   -Continue Routine observation as you are   -C/w outpatient meds. Risperdal 2mg QD and Sertraline 100 mg QD.   -May give 2mg IV ativan q6 PRN for agitation   -Collateral from patient's father in HPI   -Patient medically cleared and to be d/c to [facility]  today for psychiatric stabilization.   
-Recommend focus on medical optimization, blood pressure control. Appreciate neuro recs. EEG results normal with no seizure activity and repeat CT Head shows no acute issues. R/o infectious etiologies given elevated WBC  -Monitor blood pressures   -Continue Routine observation as you are   -Consider restarting outpatient meds. Risperdal 2mg QD and Sertraline 100 mg QD.   -May give 2mg IV ativan q6 PRN for agitation   -Collateral from patient's father in HPI   -Ongoing consideration for dispo to [facility] once medically cleared for psychiatric stabilization.   -Will continue to follow  
-Recommend focus on neuro w/u and medical optimization. EEG results normal with no seizure activity and repeat CT Head. R/o infectious etiologies given elevated WBC  -Recommend UA and CK level   -Monitor blood pressures   -Continue Routine observation as you are   -Consider restarting outpatient meds. Risperdal 2mg QD and Sertraline 100 mg QD.   -May give 2mg IV ativan q6 PRN for agitation   -Collateral from patient's father in HPI   -Ongoing consideration for dispo to [facility] once medically cleared for psychiatric stabilization.   -Will continue to follow

## 2021-05-10 NOTE — DISCHARGE NOTE NURSING/CASE MANAGEMENT/SOCIAL WORK - PATIENT PORTAL LINK FT
You can access the FollowMyHealth Patient Portal offered by [facility] by registering at the following website: [email or web address]. By joining [facility]’s FollowMyHealth portal, you will also be able to view your health information using other applications (apps) compatible with our system.

## 2021-05-10 NOTE — DISCHARGE NOTE PROVIDER - NSDCCPCAREPLAN_GEN_ALL_CORE_FT
PRINCIPAL DISCHARGE DIAGNOSIS  Diagnosis: Change in mental status  Assessment and Plan of Treatment:       SECONDARY DISCHARGE DIAGNOSES  Diagnosis: Abnormal CT scan of head  Assessment and Plan of Treatment:     Diagnosis: Hypertension  Assessment and Plan of Treatment: Continue blood pressure medication regimen as directed. Monitor for any visual changes, headaches or dizziness.  Monitor blood pressure regularly.  Follow up with your PCP for further management for high blood pressure.      Diagnosis: Leukocytosis  Assessment and Plan of Treatment: Resolved    Diagnosis: Schizophrenia  Assessment and Plan of Treatment: Psychiatry evaluated and adjusted medications, and found that you were not taking your medication as directed. You will be transferred to [facility] for ongoing Psychiatric care for your Schizophrenia.     PRINCIPAL DISCHARGE DIAGNOSIS  Diagnosis: Change in mental status  Assessment and Plan of Treatment: When you came in to the hospital you were having periods of altered mental status. No signs of infection found. You symptoms are likely due to your schizophrenia not being well managed. Psychiatrist Dr Ramirez evaluated you and started you on  Risperdal 2mg QD and Sertraline 100 mg QD. You will be transferred to [facility] for further psychiatric care to help manage your schizophrenia.        SECONDARY DISCHARGE DIAGNOSES  Diagnosis: Abnormal CT scan of head  Assessment and Plan of Treatment: You were found to have a tiny cyst with in your brain on CT scan. Neurosurgery evaluated you, and determined that you were stable and no surgical intervention was needed at this time.  You will need further follow up and surviellance imaging . Please call and schedule outpatient follow up with Dr. Tello in 1 month. Please have patient call to make an appointment [phone]    Diagnosis: Hypertension  Assessment and Plan of Treatment: Continue blood pressure medication regimen as directed. Monitor for any visual changes, headaches or dizziness.  Monitor blood pressure regularly.  Follow up with your PCP for further management for high blood pressure.      Diagnosis: Leukocytosis  Assessment and Plan of Treatment: Resolved    Diagnosis: Schizophrenia  Assessment and Plan of Treatment: Psychiatry evaluated and adjusted medications, and found that you were not taking your medication as directed. You will be transferred to [facility] for ongoing Psychiatric care for your Schizophrenia.

## 2021-05-10 NOTE — BH CONSULTATION LIAISON PROGRESS NOTE - NSICDXBHSECONDARYDX_PSY_ALL_CORE
Change in mental status   R41.82  

## 2021-05-10 NOTE — BH INPATIENT PSYCHIATRY ASSESSMENT NOTE - CURRENT MEDICATION
MEDICATIONS  (STANDING):  amLODIPine   Tablet 5 milliGRAM(s) Oral daily  risperiDONE   Tablet 2 milliGRAM(s) Oral daily  sertraline 100 milliGRAM(s) Oral daily    MEDICATIONS  (PRN):  artificial tears (preservative free) Ophthalmic Solution 1 Drop(s) Both EYES two times a day PRN Dry Eyes  diphenhydrAMINE 50 milliGRAM(s) Oral every 6 hours PRN agitation  diphenhydrAMINE   Injectable 50 milliGRAM(s) IntraMuscular once PRN severe agitation  haloperidol     Tablet 5 milliGRAM(s) Oral every 6 hours PRN agitation  haloperidol    Injectable 5 milliGRAM(s) IntraMuscular once PRN severe agitation  LORazepam     Tablet 2 milliGRAM(s) Oral every 6 hours PRN agitation  LORazepam   Injectable 2 milliGRAM(s) IntraMuscular once PRN severe agitation

## 2021-05-10 NOTE — BH INPATIENT PSYCHIATRY ASSESSMENT NOTE - NSTXPSYCHOGOALOTHER_PSY_ALL_CORE
Pt will be euthymic, without perceptual or behavioral disturbances, and intact reality testing x3 days and/or CGI-I<=3.

## 2021-05-10 NOTE — DISCHARGE NOTE PROVIDER - NSDCMRMEDTOKEN_GEN_ALL_CORE_FT
amLODIPine 5 mg oral tablet: 1 tab(s) orally once a day  LORazepam: 2 milligram(s) intravenous every 6 hours, As Needed agitation  ocular lubricant ophthalmic solution: 1 drop(s) to each affected eye 2 times a day, As needed, Dry Eyes  risperiDONE 2 mg oral tablet: 1 tab(s) orally once a day  sertraline 100 mg oral tablet: 1 tab(s) orally once a day
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
## 2021-05-10 NOTE — DISCHARGE NOTE PROVIDER - NSDCFUADDINST_GEN_ALL_CORE_FT
-Please call and schedule outpatient follow up with Dr. Tello in 1 month. Please have patient call to make an appointment [phone]  -Please follow up with your primary care physician regarding your hospitalization

## 2021-05-10 NOTE — BH CONSULTATION LIAISON PROGRESS NOTE - NSBHPSYCHOLCOGABN_PSY_A_CORE
disoriented to time/disoriented to situation
disoriented to time/disoriented to place
disoriented to time/disoriented to place/disoriented to situation

## 2021-05-10 NOTE — BH CONSULTATION LIAISON PROGRESS NOTE - NSBHMSESPABN_PSY_A_CORE
Soft volume/Decreased productivity/Increased latency
Soft volume/Decreased productivity/Increased latency
Soft volume/Slowed rate/Decreased productivity/Increased latency
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
## 2021-05-10 NOTE — BH CONSULTATION LIAISON PROGRESS NOTE - NSCDBILL_PSY_A_CORE
72854 - Inpatient, high complexity
85166 - Inpatient, high complexity
45099 - Inpatient, high complexity

## 2021-05-10 NOTE — DISCHARGE NOTE PROVIDER - NSFOLLOWUPCLINICS_GEN_ALL_ED_FT
[facility]  Psychiatry  [address] [place] [address]  Phone: [phone]  Fax:

## 2021-05-10 NOTE — BH CONSULTATION LIAISON PROGRESS NOTE - CASE SUMMARY
Chart reviewed. Discussed in AM rounds. Presents as oriented but odd/evasive/guarded/anxious. Poor insight into recent events. Wants to simply go home and shut down. Warrants further psych stabilization, going on 2PC to [facility] (verbal handoff provided). 
Chart reviewed. Discussed case in rounds. Pt appears withdrawn, concrete. Agree with above recs and resuming meds. Optimizing BP is priority. Will continue to follow on Monday.  Possible [facility] admission after medical clearance.
Chart reviewed. Pt discussed in AM rounds. Appears guarded vs depressed vs anxious, with red marks over eyes from rubbing them repeatedly. Sparse with speech, avoidant in eye contact, and appearing fearful. He denies overt SI, HI, AVH, rogelio, pain/discomfort, but appears less than forthcoming. Collateral obtained as above. Agree with above recs. Will continue to follow and determine eventual dispo when medically cleared (inpatient psych vs home based on patient status, family preference, etc).

## 2021-05-10 NOTE — BH CONSULTATION LIAISON PROGRESS NOTE - NSBHCONSULTMEDAGITATION_PSY_A_CORE FT
2mg IV ativan q6 PRN for agitation

## 2021-05-10 NOTE — BH INPATIENT PSYCHIATRY ASSESSMENT NOTE - CASE SUMMARY
Patient presents with active psychosis including behavorial disorganization and prominent negative sx, leading to severe functional deterioration. Also may be becoming catatonic. Restarted recently on Risperdal 2 mg, which can be continued for now along with home sertraline 100 mg daily. Consider uptitration of antipsychotic +/- trial of Ativan if developing signs of catatonia. Will require CO 1:1 for behavioral disorganization including disrobing and wandering. Will also need PT evaluation for unsteady gait/deconditioning.

## 2021-05-10 NOTE — BH INPATIENT PSYCHIATRY ASSESSMENT NOTE - OTHER PAST PSYCHIATRIC HISTORY (INCLUDE DETAILS REGARDING ONSET, COURSE OF ILLNESS, INPATIENT/OUTPATIENT TREATMENT)
Schizophrenia  Depression    Per father: "The patient was discharged from his initial hospitalization in 2002 with sertraline 100mg, Risperidone 4mg, and Haldol. Father states that the family decided to stop giving him the Haldol right away because the patient was drooling and spacey since starting the medication. They also began cutting the Risperidone in half and were only giving him 2mg every day, starting in 2002. The patient's prescribing doctor Dr. Sanabria was not made aware of theses changes until about 1 month ago. Upon discharge in 2002, patient also began following up with psychiatry and was referred to a day program at [facility] which he attended every day until the beginning of the pandemic in March 2020. "

## 2021-05-11 VITALS — RESPIRATION RATE: 18 BRPM | TEMPERATURE: 98 F

## 2021-05-11 LAB
COVID-19 SPIKE DOMAIN AB INTERP: NEGATIVE — SIGNIFICANT CHANGE UP
COVID-19 SPIKE DOMAIN ANTIBODY RESULT: 0.4 U/ML — SIGNIFICANT CHANGE UP
SARS-COV-2 IGG+IGM SERPL QL IA: 0.4 U/ML — SIGNIFICANT CHANGE UP
SARS-COV-2 IGG+IGM SERPL QL IA: NEGATIVE — SIGNIFICANT CHANGE UP

## 2021-05-11 PROCEDURE — 99232 SBSQ HOSP IP/OBS MODERATE 35: CPT

## 2021-05-11 PROCEDURE — 99223 1ST HOSP IP/OBS HIGH 75: CPT

## 2021-05-11 RX ADMIN — RISPERIDONE 2 MILLIGRAM(S): 4 TABLET ORAL at 08:35

## 2021-05-11 RX ADMIN — Medication 1 MILLIGRAM(S): at 20:47

## 2021-05-11 RX ADMIN — AMLODIPINE BESYLATE 5 MILLIGRAM(S): 2.5 TABLET ORAL at 08:35

## 2021-05-11 RX ADMIN — SERTRALINE 100 MILLIGRAM(S): 25 TABLET, FILM COATED ORAL at 08:35

## 2021-05-11 NOTE — BH PATIENT PROFILE - HOME MEDICATIONS
ocular lubricant ophthalmic solution , 1 drop(s) to each affected eye 2 times a day, As needed, Dry Eyes  amLODIPine 5 mg oral tablet , 1 tab(s) orally once a day  risperiDONE 2 mg oral tablet , 1 tab(s) orally once a day  sertraline 100 mg oral tablet , 1 tab(s) orally once a day

## 2021-05-11 NOTE — BH INPATIENT PSYCHIATRY PROGRESS NOTE - NSBHASSESSSUMMFT_PSY_ALL_CORE
Patient presents with active psychosis including behavorial disorganization and prominent negative sx, leading to severe functional deterioration. Symptoms consistent with catatonia (vs. EPS secondary to Risperdal use but less likely). Restarted recently on Risperdal 2 mg, which can be continued for now along with home sertraline 100 mg daily. Consider uptitration of antipsychotic and trial of Ativan for catatonia.     Plan:  - Continue Risperdal 2 mg daily and Sertraline 100 mg daily  - Ativan trial for catatonia  - Safety: CO 1:1 for behavioral disorganization. PRN ativan/haldol/benadryl PO/IM for agitation/severe agitation  - Continue Amlodipine 5 mg daily for HTN

## 2021-05-11 NOTE — BH INPATIENT PSYCHIATRY PROGRESS NOTE - MSE UNSTRUCTURED FT
Pt is a 44 yo man sitting in bed on approach this morning, dressed in hospital gown. Poor grooming and hygiene including poor dentition and nail care, appears older than stated age. Minimally able to participate in interview. Verbally unresponsive to majority of questions. Poor eye contact. Exhibits occasional stereotypy (finger/hand play). Mild to moderate rigidity in bilateral arms. Ambitendency when asked to shake writer's hand. Speech soft, slow and latent. TP impoverished vs. internally preoccupied. Unable to assess TC. Insight and judgement are poor. Normal impulse control today.

## 2021-05-11 NOTE — BH PATIENT PROFILE - FALLEN IN THE PAST
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  The permanent Whipple specimen came back with an incidental, T4, diffuse type signet ring gastric cancer with a microscopically positive margin.     Pertinent Oncologic History: 51 F w/ history of atypical abdominal symptoms and GI complaints including nausea and intermittent diarrhea.  For this, she underwent work-up that showed a pancreatic head cyst more than 1 year ago.  While on surveillance, her most recent EUS showed increase in the size of the cyst described as dilation of the main pancreatic duct in the head of the pancreas to 10.4 mm.  MRI 9 mm, and CT 8 mm.  No other suspicious features.  FNA and fluid analysis showed mucinous epithelium, a string sign, high fluid amylase, and normal fluid CEA.   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  The permanent Whipple specimen came back with an incidental, T4, diffuse type signet ring gastric cancer with a microscopically positive margin.  She recovered from her Whipple, then completed a course of neoadjuvant taxotere, 5-FU, and oxaliplatin, which she has had some difficulty with (including 2 hospitalizations).  She has been recently restaged on 8/21/2020 via CTAP and on 9/2/2020 via PET-CT without clear evidence of disease progression or obvious metastatic disease.  There is a right upper quadrant peritoneal nodule that has been stable imaging since late July and is not PET-avid.  Her most recent albumin 12 days ago was 1.8.  She now presents for discussion of management moving forward.     Pertinent Exam: Abdomen soft, non-tender, and non-distended.  No jaundice or scleral icterus.  Her midline incision is well healed.  She is in a wheel chair due to difficulty walking  "somewhat from strength, but mostly from neuropathy.  She has had falls because of neuropathy.    /56 (BP Location: Right arm, Patient Position: Sitting, Cuff Size: Adult Regular)   Pulse 101   Temp 98  F (36.7  C) (Oral)   Resp 16   Ht 1.753 m (5' 9\")   Wt 76.1 kg (167 lb 11.2 oz)   LMP 09/23/2014   SpO2 100%   BMI 24.76 kg/m       HISTORY OF PRESENT ILLNESS:  The patient is feeling better, but states the chemotherapy has wiped her out.  Weight is currently stable, but she struggles with taste and neuropathy still.  Her last dose of chemotherapy was approximately 2 weeks ago.  She is in a wheelchair as it is difficult to walk any distance with the neuropathy and she has had falls as a result.  She still feels weak.       Pertinent Work-Up/Findings:     Labs (9/4/2020): CBC w/ anemia and WBC of 8.0.  CMP w/ albumin 2.5, normal bilirubin/AST/ALT, and alk phos 200.  CEA 4.0.  CA 19-9 Pending.    (8/23/2020): CBC w/ anemia and WBC of 11.6.  CMP w/ albumin of 1.8, normal bilirubin/AST/ALT, and alk phos 193.  C. Diff and enteric panel negative.    CTAP (8/21/2020): IMPRESSION:   1. There is mild wall thickening of the cecum and rectum, concerning for colitis.   2. Thickening of the bladder wall with urothelial enhancement concerning for cystitis.  3. Postsurgical changes of Whipple procedure.   4. 1.8 cm soft tissue nodule in the right upper quadrant of the abdomen, abutting the abdominal wall, concerning for metastasis. This is unchanged in size compared to 7/28/2020.  5. Unchanged mild enhancement of the common bile duct, which may represent cholangitis. Small central pneumobilia and gas in the main pancreatic duct.  6. Small volume ascites in the abdomen and pelvis, slightly increased compared to the previous exam.    PET-CT (9/2/2020): IMPRESSION: In this patient with a history of gastric adenocarcinoma status post Whipple procedure and chemotherapy:  1. Postsurgical changes of Whipple procedure.  No " abnormal hypermetabolism or focal masslike lesion at the surgical site, remaining pancreas, or stomach to suggest local recurrence.  2. Unchanged small peritoneal nodule in the right upper quadrant, similar to 8/21/2020, and progressively smaller and less metabolic since 3/13/2020.  Appearance on older studies suggested this was inflammatory such as fat necrosis, with remaining tissue suggesting scar.  Metastasis felt to be less likely.  Recommend continued attention on follow-up.  3. Unchanged wall thickening and enhancement of the common bile duct, nonspecific, may be reactive to Bilroth II surgical changes.  Recurrent cholangitis is a consideration in the correct clinical context.   4. Heterogenous enhancement of the liver suggesting hepatitis, perhaps related to recent (or recurrent) cholangitis or drug toxicity.  5. Findings of third-spacing, including new trace left pleural effusion, mild edema in the upper abdomen and paracolic gutters, and small volume free fluid in the pelvis.   6. Resolved right colitis.     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  Final pathology showed benign changes in the pancreas and also an incidental, T4, diffuse-type signet ring gastric cancer with a submucosal growth pattern.  The microscopic margin on the stomach was positive.  She has subsequently undergone a course of neoadjuvant chemotherapy, which she has had struggles with, and now presents for recommendations on management moving forward.  Her prior imaging showed a right upper quadrant peritoneal nodule, but this was felt to be most consistent with inflammatory etiology per radiology.  She has had radiographic restaging with recent CTAP and PET-CT.  Again, these do not show clear evidence of disease progression or presence of metastatic disease.  I had a long, repeat discussion of gastric cancer and it's natural history in detail.  The  type of cancer she has is aggressive and carries high risk for recurrence and distant spread.  In addition, it tends to involve most, or all of the stomach.  She has completed neoadjuvant therapy without clear evidence of disease progression or presence of metastatic disease, so this bodes positively for her.  Her main issue right now is that she needs to recover from chemotherapy.  Her neuropathy and need for wheelchair, weight loss in the past few months, presence of fluid third spacing on PET-CT, and low albumin at her most recent lab check suggest significant malnutrition and chemotherapy effect that will need to improve prior to proceeding with curative intent surgery.  She is currently scheduled for surgery on 9/22/2020.  I would plan to move this back a week at least given her last chemotherapy date and current condition.  We will work on this.  We will plan on drawing repeat CMP and pre-albumin the week prior to her scheduled surgery to be sure she has made adequate nutritional progress to proceed.  Will also plan on a phone visit at that time to be sure she is in adequate physical condition.  She has already seen a nutritionist.  We will talk to medical oncology, but she may benefit from PT and/or rehab.  She will also see PACS pre-operatively.  I did discuss the planned surgery today with her in detail.  As stated, the entire stomach may be involved with cancer, but given the type and pattern of disease in her stomach, the only way to determine this will be with pathologic evaluation.  In addition, she still has risk for peritoneal recurrence.  Given this, I would plan to proceed first with a diagnostic laparoscopy and peritoneal washings and biopsies as necessary, followed by attempted curative intent resection approximately a week later if the laparoscopy showed no evidence of metastatic disease.  Given that laparoscopy will occur in a month, we will also obtain repeat CTAP prior to that time to reassess  the known peritoneal nodule.  I discussed that her definitive resection would involve at least an open sub-total gastrectomy, lymphadenectomy, grant-en-y gastrojejunostomy (given prior Whipple w/ Billroth II reconstruction), and likely placement of feeding jejunostomy.  I would have the margin examined during surgery under frozen section.  If the margin were negative, then we would remove no further stomach.  If the margin were positive, then we would likely have to proceed with total gastrectomy, lymphadenectomy, grant-en-y esophagojejunostomy, and feeding jejunostomy.  The benefit to this surgery would be for potential prolongation of life and/or cure.  I also discussed risks including but not limited to death, bleeding, infection, COVID-19, pneumonia, cardiac events, renal failure, stroke, DVT/PE, UTI, anastomotic leaks, damage to surrounding structures, bowel obstruction, prolonged ileus, malnutrition w/ failure to thrive, need for prolonged enteral or parenteral nutrition, need for further procedures due to complications, chyle/lymph leak, marginal ulceration, internal hernias, ventral hernias, dumping syndrome, afferent/efferent limb syndrome, and chronic changes in the way she needs to eat.  I also discussed the risks of laparoscopy, which are the same as above minus the complications directly related to the gastrectomy and gastrointestinal reconstruction.  All questions were answered and the patient was in agreement with and understanding of the plan.     Total time spent with the patient was 45 minutes, of which more than half was counseling and coordination of care.   no

## 2021-05-11 NOTE — BH INPATIENT PSYCHIATRY PROGRESS NOTE - CASE SUMMARY
45M w/schizophrenia, admitted with psychosis, presenting with catatonic features and failure to thrive.  Mutism, stupor present.  Continue CO for disorganization.  Ativan 1 mg tid.  Plan for titration of risperidone when catatatonia alleviates.

## 2021-05-11 NOTE — BH SOCIAL WORK INITIAL PSYCHOSOCIAL EVALUATION - NSBHTREATHX_PSY_ALL_CORE
Dr. Caroline Guy ( [facility] psychiatrist) [phone]  [employer] Drake ([facility] day program) [phone]  Marisol Rivera ( at day program) [phone]/Yes...

## 2021-05-11 NOTE — DIETITIAN INITIAL EVALUATION ADULT. - OTHER INFO
Pt was recently hospitalized at [facility] for HTN. Seen Pt in room, he was on CO: 2, sitting in bed, quiet, minimal interaction with writer. Per RN, Pt ate some breakfast this morning. No GI distress noted. No height/weight available to fully assess patient. Will add nutrient dense snacks in Pt's diet.

## 2021-05-11 NOTE — PSYCHIATRIC REHAB INITIAL EVALUATION - NSBHPRRECOMMEND_PSY_ALL_CORE
Writer attempted to meet with patient in order to orient patient to unit, and introduce patient to psychiatric staff and department functions. However, writer is completing this assessment from chart due to patient's symptomatology at the time of this session, and being unresponsive to writer's attempt to engage. Writer selected an appropriate psychiatric rehabilitation goal. Psychiatric rehabilitation staff will continue to engage patient daily in order to develop therapeutic rapport. In response to COVID19, unit programming will be re-evaluated on a consistent basis in effort to maintain safety guidelines.

## 2021-05-11 NOTE — BH SOCIAL WORK INITIAL PSYCHOSOCIAL EVALUATION - NSPROGENSOURCEINFO_PSY_ALL_CORE
Writer attempted to meet with patient, but he was not verbal. Pt sat up when engaged, but would not make eye contact, speak or sign HIPAA consent forms. MD reports that patient is catatonic at this time./Other(s)
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
## 2021-05-11 NOTE — BH INPATIENT PSYCHIATRY PROGRESS NOTE - NSBHFUPINTERVALHXFT_PSY_A_CORE
No overnight events. Per staff, pt ate small portion of breakfast this morning. Intermittently awake during night per sleep log. Pt minimally responsive on interview. Able to give one word responses to questions like "where do you live," "what's your name" but does not respond to more open ended questions asking how he is doing.

## 2021-05-11 NOTE — CONSULT NOTE ADULT - ASSESSMENT
45 y.o. M with h/o HTN, schizophrenia admitted at [facility] for decompensated schizophrenia, transferred to [facility] for further management of Schizophrenia.    #HTN: PB elevated. Asymptomatic  -Cont. Amlodipine 5mg daily  -Monitor BP  -If BP persistently elevated, titrate up amlodipne to 10mg daily    # Schizophrenia  -Management as per Psych       D/W Psych

## 2021-05-11 NOTE — PHYSICAL THERAPY INITIAL EVALUATION ADULT - PLANNED THERAPY INTERVENTIONS, PT EVAL
balance training/bed mobility training/gait training/postural re-education/strengthening/stretching/transfer training

## 2021-05-11 NOTE — PHYSICAL THERAPY INITIAL EVALUATION ADULT - LIVES WITH, PROFILE
as per medical records: Before this change, the patient was not very talkative at baseline but he would go to the store every morning to get his father a bagel and the paper and would help his mother around the house/parents

## 2021-05-11 NOTE — CONSULT NOTE ADULT - SUBJECTIVE AND OBJECTIVE BOX
HPI: 45 y.o. M with h/o HTN, schizophrenia, admitted to [facility] for decompensating schizophrenia. Transferred to [facility] for further management of Schizophrenia. Currently, pt is catatonic, poor historian. However, he denies any pain or discomfort.     PAST MEDICAL & SURGICAL HISTORY:  Schizophrenia    Surgical history unknown        Review of Systems:   CONSTITUTIONAL: No fever, weight loss, or fatigue  EYES: No eye pain, visual disturbances, or discharge  ENMT:  No difficulty hearing, tinnitus, vertigo; No sinus or throat pain  NECK: No pain or stiffness  RESPIRATORY: No cough, wheezing, chills or hemoptysis; No shortness of breath  CARDIOVASCULAR: No chest pain, palpitations, dizziness, or leg swelling  GASTROINTESTINAL: No abdominal or epigastric pain. No nausea, vomiting, or hematemesis; No diarrhea or constipation. No melena or hematochezia.  GENITOURINARY: No dysuria, frequency, hematuria, or incontinence  NEUROLOGICAL: No headaches, memory loss, loss of strength, numbness, or tremors  SKIN: No itching, burning, rashes, or lesions   LYMPH NODES: No enlarged glands  ENDOCRINE: No heat or cold intolerance; No hair loss  MUSCULOSKELETAL: No joint pain or swelling; No muscle, back, or extremity pain  HEME/LYMPH: No easy bruising, or bleeding gums  ALLERY AND IMMUNOLOGIC: No hives or eczema    Allergies    No Known Allergies    Intolerances        Social History: Unable to get information due to his catatonia     FAMILY HISTORY:  Family history unknown        MEDICATIONS  (STANDING):  amLODIPine   Tablet 5 milliGRAM(s) Oral daily  risperiDONE   Tablet 2 milliGRAM(s) Oral daily  sertraline 100 milliGRAM(s) Oral daily    MEDICATIONS  (PRN):  artificial tears (preservative free) Ophthalmic Solution 1 Drop(s) Both EYES two times a day PRN Dry Eyes  diphenhydrAMINE 50 milliGRAM(s) Oral every 6 hours PRN agitation  diphenhydrAMINE   Injectable 50 milliGRAM(s) IntraMuscular once PRN severe agitation  haloperidol     Tablet 5 milliGRAM(s) Oral every 6 hours PRN agitation  haloperidol    Injectable 5 milliGRAM(s) IntraMuscular once PRN severe agitation  LORazepam     Tablet 2 milliGRAM(s) Oral every 6 hours PRN agitation  LORazepam   Injectable 2 milliGRAM(s) IntraMuscular once PRN severe agitation      Vital Signs Last 24 Hrs  T(C): 36.6 (11 May 2021 09:33), Max: 36.6 (11 May 2021 09:33)  T(F): 97.8 (11 May 2021 09:33), Max: 97.8 (11 May 2021 09:33)  HR: --107  BP: --157/98  BP(mean): --  RR: 18 (11 May 2021 09:33) (18 - 18)  SpO2: --  CAPILLARY BLOOD GLUCOSE            PHYSICAL EXAM:  GENERAL: NAD, well-developed  HEAD:  Atraumatic, Normocephalic  EYES: EOMI, conjunctiva and sclera clear  NECK: Supple, No JVD  CHEST/LUNG: Clear to auscultation bilaterally; No wheeze  HEART: Regular rate and rhythm; No murmurs, rubs, or gallops  ABDOMEN: Soft, Nontender, Nondistended; Bowel sounds present  EXTREMITIES:  2+ Peripheral Pulses, No clubbing, cyanosis, or edema  NEUROLOGY: non-focal  SKIN: No rashes or lesions    LABS:    05-10    145  |  104  |  18  ----------------------------<  96  3.5   |  28  |  0.73    Ca    9.2      10 May 2021 07:20  Phos  3.3     05-10  Mg     1.9     05-10                EKG(personally reviewed):     RADIOLOGY & ADDITIONAL TESTS:    Imaging Personally Reviewed:    Consultant(s) Notes Reviewed:      Care Discussed with Consultants/Other Providers:

## 2021-05-12 PROCEDURE — 99232 SBSQ HOSP IP/OBS MODERATE 35: CPT

## 2021-05-12 RX ADMIN — Medication 2 MILLIGRAM(S): at 12:51

## 2021-05-12 RX ADMIN — RISPERIDONE 2 MILLIGRAM(S): 4 TABLET ORAL at 08:27

## 2021-05-12 RX ADMIN — SERTRALINE 100 MILLIGRAM(S): 25 TABLET, FILM COATED ORAL at 08:27

## 2021-05-12 RX ADMIN — Medication 1 MILLIGRAM(S): at 08:27

## 2021-05-12 RX ADMIN — Medication 2 MILLIGRAM(S): at 20:47

## 2021-05-12 RX ADMIN — AMLODIPINE BESYLATE 5 MILLIGRAM(S): 2.5 TABLET ORAL at 08:27

## 2021-05-12 NOTE — BH INPATIENT PSYCHIATRY PROGRESS NOTE - NSBHASSESSSUMMFT_PSY_ALL_CORE
Patient presents with active psychosis including behavorial disorganization and prominent negative sx, leading to severe functional deterioration, and on admission found to also be exhibiting s/s of catatonia. Has now received 2 doses of Ativan 1 mg, and showing noticeable improvement in symptoms of catatonia, including decreased rigidity, negativism and stereotypy. Maintaining some daily PO food intake with encouragement. Will continue Ativan 1 mg TID for catatonia.     Plan:  - Psychiatric meds: Ativan 1 mg TID; Risperdal 2 mg daily; Sertraline 100 mg daily  - Safety: Continue CO 1:1 for behavioral disorganization. PRN ativan/haldol/benadryl PO/IM for agitation/severe agitation  - Medical: Continue Amlodipine 5 mg daily for HTN. BP and other vitals have been stable and WNL.

## 2021-05-12 NOTE — BH INPATIENT PSYCHIATRY PROGRESS NOTE - MSE UNSTRUCTURED FT
44 yo man, appears significantly older than current age with poor grooming and hygiene including poor dentition and nail care, dressed in hospital gown. Arousable and more reactive, sits up over side of bed, cooperative. Still verbally unresponsive to majority of questions, mostly grunts to communicate. Poor eye contact. Decreased stereotypy (finger/hand play). Noticeable decrease in rigidity in bilateral arms (still R>L). Less hesitation/ambitendency when asked to shake writer's hand. Speech barely audible, less latent. TP impoverished, appears less internally preoccupied. Unable to assess TC. Insight and judgement are difficult to assess. Impulse control intact at time of exam.

## 2021-05-12 NOTE — BH INPATIENT PSYCHIATRY PROGRESS NOTE - CASE SUMMARY
45M w/schizophrenia, admitted with psychosis, presenting with catatonic features and failure to thrive.  Mutism, stupor present.  Continue CO for disorganization.  Ativan 1 mg tid.  Plan for titration of risperidone when catatatonia alleviates.   Pt continues to have limited speech, mostly nonverbal with affirmative grunts.  Does not make eye contact.  Lying in bed motion less.  As per CO, did eat little bit of breakfast this AM (most of food still on plate on table).      Will reduce CO to 7A-11P given no events overnight and pt sleeping.  Staff aware.

## 2021-05-12 NOTE — BH INPATIENT PSYCHIATRY PROGRESS NOTE - NSBHFUPINTERVALHXFT_PSY_A_CORE
No overnight events. Per staff, patient has been sleeping all morning and refused breakfast. However, in interview patient is able to be aroused, and is more reactive today. When asked he expresses that he now wants breakfast. Remains minimally verbal. Denies current complaints.

## 2021-05-13 LAB — PYRIDOXAL PHOS SERPL-MCNC: 1.4 UG/L — LOW (ref 5.3–46.7)

## 2021-05-13 PROCEDURE — 99231 SBSQ HOSP IP/OBS SF/LOW 25: CPT

## 2021-05-13 RX ADMIN — SERTRALINE 100 MILLIGRAM(S): 25 TABLET, FILM COATED ORAL at 08:19

## 2021-05-13 RX ADMIN — AMLODIPINE BESYLATE 5 MILLIGRAM(S): 2.5 TABLET ORAL at 08:19

## 2021-05-13 RX ADMIN — Medication 2 MILLIGRAM(S): at 20:50

## 2021-05-13 RX ADMIN — Medication 2 MILLIGRAM(S): at 08:18

## 2021-05-13 RX ADMIN — RISPERIDONE 2 MILLIGRAM(S): 4 TABLET ORAL at 08:18

## 2021-05-13 RX ADMIN — Medication 2 MILLIGRAM(S): at 12:37

## 2021-05-13 NOTE — BH INPATIENT PSYCHIATRY PROGRESS NOTE - NSBHASSESSSUMMFT_PSY_ALL_CORE
Patient presents with active psychosis including behavorial disorganization and prominent negative sx, leading to severe functional deterioration, and on admission found to also be exhibiting s/s of catatonia. Has now received 2 doses of Ativan 1 mg, and showing noticeable improvement in symptoms of catatonia, including decreased rigidity, negativism and stereotypy. Maintaining some daily PO food intake with encouragement. Will continue Ativan 1 mg TID for catatonia.     Plan:  - Psychiatric meds: Ativan 1 mg TID; Risperdal 2 mg daily; Sertraline 100 mg daily  - Safety: Continue CO 1:1 for behavioral disorganization. PRN ativan/haldol/benadryl PO/IM for agitation/severe agitation  - Medical: Continue Amlodipine 5 mg daily for HTN. BP and other vitals have been stable and WNL.  Patient presents with active psychosis including behavorial disorganization and prominent negative sx, leading to severe functional deterioration, and on admission found to also be exhibiting s/s of catatonia. Has now received 2 doses of Ativan 1 mg, and showing noticeable improvement in symptoms of catatonia, including decreased rigidity, negativism and stereotypy. Maintaining some daily PO food intake with encouragement. Will continue Ativan 2 mg TID for catatonia.     Plan:  - Psychiatric meds: Ativan 2 mg TID; Risperdal 2 mg daily; Sertraline 100 mg daily  - Safety: Continue CO 1:1 for behavioral disorganization. PRN ativan/haldol/benadryl PO/IM for agitation/severe agitation  - Medical: Continue Amlodipine 5 mg daily for HTN. BP and other vitals have been stable and WNL.

## 2021-05-13 NOTE — BH INPATIENT PSYCHIATRY PROGRESS NOTE - CASE SUMMARY
Pt continues to be catatonic, suspect underlying psychosis.  Pt does not provide any clear choice on multiple attempts to discuss ECT.  Will contact family to see if they would be willing to consent.    Consider increasing lorazepam.  Continue remainder of regimen as ordered.

## 2021-05-13 NOTE — BH INPATIENT PSYCHIATRY PROGRESS NOTE - NSBHFUPINTERVALHXFT_PSY_A_CORE
No overnight events. Per sleep log, pt slept through night. Remains minimally/selectively responsive. Reports he was in bed all day yesterday. Says he ate some eggs for breakfast this morning. When asked if ECT was something he would be willing to try, pt does not respond. Denies current complaints. No SIIP, AH/VH.

## 2021-05-14 PROCEDURE — 99231 SBSQ HOSP IP/OBS SF/LOW 25: CPT

## 2021-05-14 RX ADMIN — SERTRALINE 100 MILLIGRAM(S): 25 TABLET, FILM COATED ORAL at 10:02

## 2021-05-14 RX ADMIN — Medication 2 MILLIGRAM(S): at 09:56

## 2021-05-14 RX ADMIN — Medication 2 MILLIGRAM(S): at 21:46

## 2021-05-14 RX ADMIN — RISPERIDONE 2 MILLIGRAM(S): 4 TABLET ORAL at 09:56

## 2021-05-14 RX ADMIN — AMLODIPINE BESYLATE 5 MILLIGRAM(S): 2.5 TABLET ORAL at 09:56

## 2021-05-14 RX ADMIN — Medication 2 MILLIGRAM(S): at 13:08

## 2021-05-14 NOTE — BH INPATIENT PSYCHIATRY PROGRESS NOTE - CASE SUMMARY
Pt continues to have catatonia, mutism, stupor.  Some negativism, though able to follow directions easier today.    Consider increase in lorazepam.  Contact family re: ECT.

## 2021-05-14 NOTE — BH INPATIENT PSYCHIATRY PROGRESS NOTE - NSBHFUPINTERVALHXFT_PSY_A_CORE
No overnight events. Per sleep log, pt slept through night. Remains minimally responsive and gives short responses to questions (eg, say he is "alright," slept "fine," "just woke up"). When asked if he wanted to eat his breakfast, pt immediately sat up, made his way to the table, opened his packet of utensils and started eating. Denies current complaints. No SIIP, AH/VH.

## 2021-05-14 NOTE — BH INPATIENT PSYCHIATRY PROGRESS NOTE - NSBHASSESSSUMMFT_PSY_ALL_CORE
Patient showing improvement in symptoms of catatonia, more reactive, providing short answers to questions, able to sit up, ambulate with walker, eat breakfast.    Plan:  - Psychiatric meds: Increase to Ativan 2 mg QID; Risperdal 2 mg daily; Sertraline 100 mg daily  - Safety: Continue CO 1:1 for behavioral disorganization. PRN ativan/haldol/benadryl PO/IM for agitation/severe agitation  - Medical: Continue Amlodipine 5 mg daily for HTN. BP and other vitals have been stable and WNL.  DISPLAY PLAN FREE TEXT

## 2021-05-14 NOTE — BH INPATIENT PSYCHIATRY PROGRESS NOTE - MSE UNSTRUCTURED FT
46 yo man, appears significantly older than current age with poor grooming and hygiene including poor dentition and nail care, dressed in hospital gown. Arousable and more reactive, sits up, ambulates to table with walker, able to open his packet of utensils and eat his breakfast. Still minimally responsive to majority of questions, mostly grunts to communicate or provides one-word responses upon repeated prompting. Poor eye contact, stares at celing. Decreased stereotypy (finger/hand play). Speech barely audible, less latent. TP impoverished, appears less internally preoccupied. Unable to assess TC. Insight and judgement are difficult to assess. Impulse control intact at time of exam.

## 2021-05-15 PROCEDURE — 99231 SBSQ HOSP IP/OBS SF/LOW 25: CPT

## 2021-05-15 RX ADMIN — Medication 2 MILLIGRAM(S): at 20:21

## 2021-05-15 RX ADMIN — RISPERIDONE 2 MILLIGRAM(S): 4 TABLET ORAL at 08:34

## 2021-05-15 RX ADMIN — Medication 2 MILLIGRAM(S): at 08:33

## 2021-05-15 RX ADMIN — Medication 2 MILLIGRAM(S): at 12:31

## 2021-05-15 RX ADMIN — SERTRALINE 100 MILLIGRAM(S): 25 TABLET, FILM COATED ORAL at 08:33

## 2021-05-15 RX ADMIN — Medication 2 MILLIGRAM(S): at 18:15

## 2021-05-15 RX ADMIN — AMLODIPINE BESYLATE 5 MILLIGRAM(S): 2.5 TABLET ORAL at 08:34

## 2021-05-15 NOTE — BH INPATIENT PSYCHIATRY PROGRESS NOTE - MSE UNSTRUCTURED FT
Patient appears older than stated age, poor grooming and hygiene, dressed in hospital gown. Superficially cooperative on encounter with intermittent eye contact. Provides limited/ monosyllabic responses to most questions. Speech soft with increased latency. Thought process is tangential. Thought content is impoverished; denies SI/HI. No hallucinations but appears internally preoccupied. Insight and judgement are limited. Impulse control intact at time of exam.

## 2021-05-15 NOTE — BH INPATIENT PSYCHIATRY PROGRESS NOTE - NSBHFUPINTERVALHXFT_PSY_A_CORE
Patient seen for psychosis. No overnight events. On encounter, patient states he is "ok", gives monosyllabic responses to most questions, denies pain/ dizziness. Appetite and sleep are adequate. Denies SI/HI.

## 2021-05-15 NOTE — BH INPATIENT PSYCHIATRY PROGRESS NOTE - NSBHASSESSSUMMFT_PSY_ALL_CORE
Pt with catatonic features, minimally communicative but in behavioral control.  Plan: Will c/w Ativan, Risperdal, Sertraline and PRN meds at current dose; will continue CO for behavioral disorganization.

## 2021-05-16 PROCEDURE — 99231 SBSQ HOSP IP/OBS SF/LOW 25: CPT

## 2021-05-16 RX ADMIN — Medication 2 MILLIGRAM(S): at 20:22

## 2021-05-16 RX ADMIN — SERTRALINE 100 MILLIGRAM(S): 25 TABLET, FILM COATED ORAL at 08:17

## 2021-05-16 RX ADMIN — Medication 2 MILLIGRAM(S): at 13:05

## 2021-05-16 RX ADMIN — Medication 2 MILLIGRAM(S): at 16:46

## 2021-05-16 RX ADMIN — AMLODIPINE BESYLATE 5 MILLIGRAM(S): 2.5 TABLET ORAL at 08:17

## 2021-05-16 RX ADMIN — RISPERIDONE 2 MILLIGRAM(S): 4 TABLET ORAL at 08:17

## 2021-05-16 RX ADMIN — Medication 2 MILLIGRAM(S): at 08:17

## 2021-05-16 NOTE — BH INPATIENT PSYCHIATRY PROGRESS NOTE - MSE UNSTRUCTURED FT
Patient appears older than stated age, poor grooming and hygiene, dressed in hospital gown. Unable to complete MSE as patient is sleeping and not arousable.

## 2021-05-16 NOTE — BH INPATIENT PSYCHIATRY PROGRESS NOTE - NSBHASSESSSUMMFT_PSY_ALL_CORE
Pt with catatonic features, sleeping on encounter; per staff, in behavioral control. BP stable.   Plan: Will c/w Ativan, Risperdal, Sertraline and PRN meds at current dose; will continue CO for behavioral disorganization.

## 2021-05-16 NOTE — BH INPATIENT PSYCHIATRY PROGRESS NOTE - NSBHFUPINTERVALHXFT_PSY_A_CORE
Patient seen for psychosis. No overnight events. No PRNs. On encounter, patient is sleeping, unable to arouse him even on nudging. Per staff, oral intake. Patient is med compliant.

## 2021-05-17 PROCEDURE — 99231 SBSQ HOSP IP/OBS SF/LOW 25: CPT

## 2021-05-17 RX ADMIN — SERTRALINE 100 MILLIGRAM(S): 25 TABLET, FILM COATED ORAL at 08:44

## 2021-05-17 RX ADMIN — Medication 2 MILLIGRAM(S): at 21:02

## 2021-05-17 RX ADMIN — AMLODIPINE BESYLATE 5 MILLIGRAM(S): 2.5 TABLET ORAL at 08:44

## 2021-05-17 RX ADMIN — RISPERIDONE 2 MILLIGRAM(S): 4 TABLET ORAL at 08:44

## 2021-05-17 RX ADMIN — Medication 2 MILLIGRAM(S): at 12:48

## 2021-05-17 RX ADMIN — Medication 2 MILLIGRAM(S): at 17:07

## 2021-05-17 RX ADMIN — Medication 2 MILLIGRAM(S): at 08:44

## 2021-05-17 NOTE — PHYSICAL THERAPY INITIAL EVALUATION ADULT - ADDITIONAL COMMENTS
as per medical records: Before this change, the patient was not very talkative at baseline but he would go to the store every morning to get his father a bagel and the paper and would help his mother around the house
Per chart review, patient lives with family in a private residence. Patient has rolling walker at bedside.

## 2021-05-17 NOTE — PHYSICAL THERAPY INITIAL EVALUATION ADULT - IMPAIRMENTS FOUND, PT EVAL
gait, locomotion, and balance/muscle strength/posture
tightness at end range./aerobic capacity/endurance/gait, locomotion, and balance/gross motor/joint integrity and mobility/muscle strength/poor safety awareness/ROM

## 2021-05-17 NOTE — PHYSICAL THERAPY INITIAL EVALUATION ADULT - PLANNED THERAPY INTERVENTIONS, PT EVAL
bed mobility training/gait training/strengthening/transfer training Patient was left safely in bed, all lines/tubes intact and call bell within reach, 1:1 present in room/bed mobility training/gait training/strengthening/transfer training

## 2021-05-17 NOTE — BH INPATIENT PSYCHIATRY PROGRESS NOTE - CASE SUMMARY
Pt has been slightly more active, eating breakfast, trying to make phone call.  Still with mutism, some level of stupor.  Catatonia.  Continue meds as ordered.

## 2021-05-17 NOTE — PHYSICAL THERAPY INITIAL EVALUATION ADULT - PERTINENT HX OF CURRENT PROBLEM, REHAB EVAL
Patient is a 45 year old male with PMH: schizophrenia brought in by father for change in mental status and functional deterioration, admitted to medicine for HTN which normalized, now transferred to psychiatry for continued evaluation/treatment of worsening psychosis and functional status.
as per medical records: 45M with PPHx of schizophrenia brought in by father for change in mental status and functional deterioration, admitted to medicine for HTN which normalized, now transferred to psychiatry for continued evaluation/treatment of worsening psychosis and functional status.Will require CO 1:1 for behavioral disorganization including disrobing and wandering. Will also need PT evaluation for unsteady gait/deconditioning

## 2021-05-17 NOTE — PHYSICAL THERAPY INITIAL EVALUATION ADULT - GAIT DEVIATIONS NOTED, PT EVAL
decreased jaki/decreased step length/decreased stride length/increased stride width
shuffling gait, requires manual assistance to turn walker as well as cues to ambulate close to assistive device/decreased jaki/decreased step length/decreased stride length/decreased weight-shifting ability

## 2021-05-17 NOTE — PHYSICAL THERAPY INITIAL EVALUATION ADULT - IMPAIRMENTS CONTRIBUTING TO GAIT DEVIATIONS, PT EVAL
kyphotic posture./impaired balance/impaired postural control/decreased strength
impaired balance/impaired postural control/decreased strength

## 2021-05-17 NOTE — BH INPATIENT PSYCHIATRY PROGRESS NOTE - MSE UNSTRUCTURED FT
Patient seen outside room, by phone this morning. Still with poor grooming and hygiene (poor dentition, nail care, dry skin flakes on face), appears older than stated age, dressed in hospital gown. Calm and cooperative. Poor eye contact, stares down at floor. Speech is minimal, non-spontaneous, very soft/difficult to hear. Mood is "alright." Affect restricted, congruent with mood. TP impoverished, less internally preoccupied. Unable to assess TC. Insight and judgement limited (says "I don't belong here").Impulse control intact at time of exam.

## 2021-05-17 NOTE — PHYSICAL THERAPY INITIAL EVALUATION ADULT - TRANSFER SAFETY CONCERNS NOTED: SIT/STAND, REHAB EVAL
decreased balance during turns/decreased step length/decreased weight-shifting ability
decreased balance during turns/decreased sequencing ability

## 2021-05-17 NOTE — BH INPATIENT PSYCHIATRY PROGRESS NOTE - NSBHFUPINTERVALHXFT_PSY_A_CORE
No acute overnight events. Slept well through night per sleep log. Compliant with meds. Pt seen outside room, by phone, says he tried calling his parents. Selectively responsive to questions; says "I don't belong here," "I want to go back to sleep." Reports he ate eggs for breakfast. Per staff, pt is very unsteady on his feet; still incontinent.

## 2021-05-17 NOTE — BH INPATIENT PSYCHIATRY PROGRESS NOTE - NSBHASSESSSUMMFT_PSY_ALL_CORE
Pt showing mild improvement, slightly more responsive to questions (short sentences compared to one word responses prior), eating breakfast, ambulating with walker although very unsteady. Per staff, in behavioral control, eating, still incontinent and very unsteady on his feet.    Plan:   - Continue Ativan, Risperdal, Sertraline and PRN meds at current dose  - Continue CO for behavioral disorganization  - PT eval for unsteady gait

## 2021-05-18 PROCEDURE — 99231 SBSQ HOSP IP/OBS SF/LOW 25: CPT

## 2021-05-18 RX ORDER — RISPERIDONE 4 MG/1
3 TABLET ORAL DAILY
Refills: 0 | Status: DISCONTINUED | OUTPATIENT
Start: 2021-05-18 | End: 2021-05-20

## 2021-05-18 RX ADMIN — SERTRALINE 100 MILLIGRAM(S): 25 TABLET, FILM COATED ORAL at 09:12

## 2021-05-18 RX ADMIN — Medication 2 MILLIGRAM(S): at 09:12

## 2021-05-18 RX ADMIN — Medication 2 MILLIGRAM(S): at 17:45

## 2021-05-18 RX ADMIN — Medication 2 MILLIGRAM(S): at 21:10

## 2021-05-18 RX ADMIN — AMLODIPINE BESYLATE 5 MILLIGRAM(S): 2.5 TABLET ORAL at 09:12

## 2021-05-18 RX ADMIN — Medication 2 MILLIGRAM(S): at 12:25

## 2021-05-18 RX ADMIN — RISPERIDONE 2 MILLIGRAM(S): 4 TABLET ORAL at 09:12

## 2021-05-18 NOTE — BH INPATIENT PSYCHIATRY PROGRESS NOTE - CASE SUMMARY
Continues to be catatonic, but following commands/suggestions today.  Consider increasing risperidone.

## 2021-05-18 NOTE — BH INPATIENT PSYCHIATRY PROGRESS NOTE - NSBHFUPINTERVALHXFT_PSY_A_CORE
No acute overnight events. Slept well through night per sleep log. Compliant with meds. Pt not as responsive to questions, mostly grunts, occasionally giving yes/no responses to questions. Reactive when told that his breakfast arrived--sits up and ambulates with walker to table to eat.

## 2021-05-18 NOTE — BH INPATIENT PSYCHIATRY PROGRESS NOTE - MSE UNSTRUCTURED FT
Patient seen laying in bed with eyes open, staring at ceiling. Still with poor grooming and hygiene, appears older than stated age, dressed in hospital gown. Calm and cooperative. Poor eye contact. Speech is minimal, non-spontaneous, mostly grunts. Unable to assess mood. Affect is flat. TP impoverished. Unable to assess TC. Poor insight and judgement. Impulse control intact at time of exam.

## 2021-05-18 NOTE — BH INPATIENT PSYCHIATRY PROGRESS NOTE - NSBHASSESSSUMMFT_PSY_ALL_CORE
Pt minimally responsive, mostly grunts in response to questions, only reacts to food/breakfast. Per staff, in behavioral control. Per PT eval yesterday, pt requires gait, bed-mobility and strengthening training.    Plan:   - Continue Ativan, Risperdal, Sertraline and PRN meds at current dose  - Continue CO for behavioral disorganization

## 2021-05-19 PROCEDURE — 99231 SBSQ HOSP IP/OBS SF/LOW 25: CPT

## 2021-05-19 RX ADMIN — Medication 2 MILLIGRAM(S): at 21:01

## 2021-05-19 RX ADMIN — Medication 2 MILLIGRAM(S): at 09:00

## 2021-05-19 RX ADMIN — Medication 2 MILLIGRAM(S): at 17:05

## 2021-05-19 RX ADMIN — AMLODIPINE BESYLATE 5 MILLIGRAM(S): 2.5 TABLET ORAL at 09:00

## 2021-05-19 RX ADMIN — RISPERIDONE 3 MILLIGRAM(S): 4 TABLET ORAL at 09:01

## 2021-05-19 RX ADMIN — SERTRALINE 100 MILLIGRAM(S): 25 TABLET, FILM COATED ORAL at 09:00

## 2021-05-19 RX ADMIN — Medication 2 MILLIGRAM(S): at 12:31

## 2021-05-19 NOTE — BH INPATIENT PSYCHIATRY PROGRESS NOTE - NSBHFUPINTERVALHXFT_PSY_A_CORE
No acute overnight events. Pt seen at table eating breakfast on approach this morning. Still selectively responsive to questions. Says he is "alright." When asked if he needed help cutting his sausage, he adamantly responds "no." Also made statements such as "I don't belong here," and "I want to go home."

## 2021-05-19 NOTE — BH INPATIENT PSYCHIATRY PROGRESS NOTE - CASE SUMMARY
Pt is making needs known a little more today.  Still presents with catatonic features.  Continue with medication management as family does not want to consent for ECT at this time.

## 2021-05-19 NOTE — BH INPATIENT PSYCHIATRY PROGRESS NOTE - MSE UNSTRUCTURED FT
Patient seen at table in room eating breakfast. Dressed in hospital gown, still with poor hygiene and grooming. Limited cooperation. Poor eye contact, staring only at his plate. Speech is still minimal, increased latency, requires repeated prompting. Mood is "alright." Affect is slightly irritable, restricted. TP impoverished. TC no SI/HI, no anh delusions elicited.  Poor insight and judgement. Impulse control intact at time of exam.

## 2021-05-19 NOTE — BH INPATIENT PSYCHIATRY PROGRESS NOTE - NSBHASSESSSUMMFT_PSY_ALL_CORE
Pt slightly more reactive today but still slow and latent in movements/speech. Per staff, in behavioral control.    Plan:  - Risperidone increased to 3 mg daily  - Continue Lorazepam and Sertraline at current doses  - Continue CO for behavioral disorganization

## 2021-05-20 PROCEDURE — 99232 SBSQ HOSP IP/OBS MODERATE 35: CPT

## 2021-05-20 RX ORDER — RISPERIDONE 4 MG/1
4 TABLET ORAL DAILY
Refills: 0 | Status: DISCONTINUED | OUTPATIENT
Start: 2021-05-20 | End: 2021-07-19

## 2021-05-20 RX ADMIN — Medication 2 MILLIGRAM(S): at 09:49

## 2021-05-20 RX ADMIN — SERTRALINE 100 MILLIGRAM(S): 25 TABLET, FILM COATED ORAL at 09:49

## 2021-05-20 RX ADMIN — Medication 2 MILLIGRAM(S): at 21:00

## 2021-05-20 RX ADMIN — Medication 2 MILLIGRAM(S): at 12:41

## 2021-05-20 RX ADMIN — AMLODIPINE BESYLATE 5 MILLIGRAM(S): 2.5 TABLET ORAL at 09:49

## 2021-05-20 RX ADMIN — Medication 2 MILLIGRAM(S): at 16:57

## 2021-05-20 RX ADMIN — RISPERIDONE 3 MILLIGRAM(S): 4 TABLET ORAL at 09:49

## 2021-05-20 NOTE — BH INPATIENT PSYCHIATRY PROGRESS NOTE - CASE SUMMARY
Pt slightly more active on unit, still with catatonic features.  Spoke with Dr. Guy who describes pt as more independent in ADLs and more communicative at baseline.  Titrate risperidone.

## 2021-05-20 NOTE — BH INPATIENT PSYCHIATRY PROGRESS NOTE - MSE UNSTRUCTURED FT
Patient sitting at table in room on approach this morning, dressed in hospital gown, poor hygiene and grooming. Does not make eye contact. Speech is more spontaneous today but intermittently latent/doesn't respond to certain questions. Mood is "fine." Affect is restricted. TP impoverished. TC no anh delusions elicited.  Poor insight and judgement. Impulse control intact at time of exam.

## 2021-05-20 NOTE — BH INPATIENT PSYCHIATRY PROGRESS NOTE - NSBHASSESSSUMMFT_PSY_ALL_CORE
Pt slightly more interactive during interview today but still only selectively responsive to questions. In behavioral control per staff.    Plan:  - Increase Risperidone to 4mg   - Continue Lorazepam and Sertraline at current doses  - Continue CO for behavioral disorganization

## 2021-05-20 NOTE — BH INPATIENT PSYCHIATRY PROGRESS NOTE - NSBHFUPINTERVALHXFT_PSY_A_CORE
Pt seen eating breakfast by table this morning. Per sleep log, pt intermittently awake through night but pt denies any trouble sleeping. Says "I want to go home" and that he doesn't like the food here. Says he has not spoken to his parents recently but wishes to go back to their home.

## 2021-05-21 PROCEDURE — 99231 SBSQ HOSP IP/OBS SF/LOW 25: CPT

## 2021-05-21 RX ADMIN — AMLODIPINE BESYLATE 5 MILLIGRAM(S): 2.5 TABLET ORAL at 08:59

## 2021-05-21 RX ADMIN — Medication 2 MILLIGRAM(S): at 13:13

## 2021-05-21 RX ADMIN — SERTRALINE 100 MILLIGRAM(S): 25 TABLET, FILM COATED ORAL at 08:58

## 2021-05-21 RX ADMIN — Medication 2 MILLIGRAM(S): at 08:58

## 2021-05-21 RX ADMIN — RISPERIDONE 4 MILLIGRAM(S): 4 TABLET ORAL at 08:58

## 2021-05-21 RX ADMIN — Medication 2 MILLIGRAM(S): at 20:36

## 2021-05-21 RX ADMIN — Medication 2 MILLIGRAM(S): at 16:34

## 2021-05-21 NOTE — BH INPATIENT PSYCHIATRY PROGRESS NOTE - NSBHASSESSSUMMFT_PSY_ALL_CORE
Pt less catatonic, able to speak, make needs known, and ambulate with walker, but still not at baseline/able to engage in ADLs independently.    Plan: Risperidone increased to 4mg, continue Lorazepam and Sertraline at current doses, continue CO for behavioral disorganization

## 2021-05-21 NOTE — BH INPATIENT PSYCHIATRY PROGRESS NOTE - MSE UNSTRUCTURED FT
Patient seated in chair on approach this morning, dressed in hospital gown, poor hygiene and grooming. Does not make eye contact. Speech is spontaneous at first but becomes less communicative as interview progresses stating "I don't feel like talking." Mood is "not so good" Affect is irritable, restricted, congruent to mood. TP impoverished. TC single-minded on going home, otherwise minimally responsive to questions, no anh delusions elicited.  Poor insight and judgement. Impulse control intact at time of exam.

## 2021-05-21 NOTE — BH INPATIENT PSYCHIATRY PROGRESS NOTE - NSBHFUPINTERVALHXFT_PSY_A_CORE
Pt more interactive on unit, seen walking with walker. Says he's feeling "not so good" as he doesn't belong here and wants to go home. Reports he has been trying to call his parents but hasn't been able to reach them. Would not elaborate on how he is feeling, stops responding to questions, and says "I don't feel like talking."

## 2021-05-22 PROCEDURE — 99231 SBSQ HOSP IP/OBS SF/LOW 25: CPT

## 2021-05-22 RX ADMIN — AMLODIPINE BESYLATE 5 MILLIGRAM(S): 2.5 TABLET ORAL at 08:53

## 2021-05-22 RX ADMIN — Medication 2 MILLIGRAM(S): at 12:40

## 2021-05-22 RX ADMIN — Medication 2 MILLIGRAM(S): at 16:46

## 2021-05-22 RX ADMIN — SERTRALINE 100 MILLIGRAM(S): 25 TABLET, FILM COATED ORAL at 08:53

## 2021-05-22 RX ADMIN — Medication 2 MILLIGRAM(S): at 08:53

## 2021-05-22 RX ADMIN — RISPERIDONE 4 MILLIGRAM(S): 4 TABLET ORAL at 08:53

## 2021-05-22 RX ADMIN — Medication 2 MILLIGRAM(S): at 20:07

## 2021-05-22 NOTE — BH INPATIENT PSYCHIATRY PROGRESS NOTE - MSE UNSTRUCTURED FT
On exam today the patient is minimally cooperative    Speech is clear and of normal rate.  Thought process: with poverty of speech.    Thought content: with poverty of content.   Perception: Denies hallucinations.  Mood: Describes as "OK".  Affect: constricted.    Patient denies active suicidal ideation, intent and plan.    Patient denies active aggressive/homicidal ideation, intent or plan.   Patient is Alert and oriented in all spheres. Patient is cognitively grossly intact.   Insight and judgment are limited. Impulse control is intact at this time.    Vital signs are stable. Gait and station WNL

## 2021-05-22 NOTE — BH INPATIENT PSYCHIATRY PROGRESS NOTE - NSBHFUPINTERVALHXFT_PSY_A_CORE
Pt seen for follow up of psychosis  In his room, eating while on CO  offers short one word answers to questions  Reports feeling that he had better balance yesterday

## 2021-05-23 PROCEDURE — 99231 SBSQ HOSP IP/OBS SF/LOW 25: CPT

## 2021-05-23 RX ADMIN — Medication 2 MILLIGRAM(S): at 13:12

## 2021-05-23 RX ADMIN — RISPERIDONE 4 MILLIGRAM(S): 4 TABLET ORAL at 09:00

## 2021-05-23 RX ADMIN — Medication 2 MILLIGRAM(S): at 08:59

## 2021-05-23 RX ADMIN — Medication 2 MILLIGRAM(S): at 16:41

## 2021-05-23 RX ADMIN — AMLODIPINE BESYLATE 5 MILLIGRAM(S): 2.5 TABLET ORAL at 08:59

## 2021-05-23 RX ADMIN — SERTRALINE 100 MILLIGRAM(S): 25 TABLET, FILM COATED ORAL at 08:59

## 2021-05-23 NOTE — BH INPATIENT PSYCHIATRY PROGRESS NOTE - NSBHFUPINTERVALHXFT_PSY_A_CORE
Pt seen for follow up of psychosis  In his room on CO  offers short one word answers to questions  Reports feeling that his balance is better

## 2021-05-23 NOTE — BH INPATIENT PSYCHIATRY PROGRESS NOTE - MSE UNSTRUCTURED FT
On exam today the patient is superficially  cooperative    Speech is clear and of normal rate.  Thought process: with poverty of speech.    Thought content: with poverty of content.   Perception: Denies hallucinations.    Mood: Describes as "OK".  Affect: constricted.    Patient denies active suicidal ideation, intent and plan.    Patient denies active aggressive/homicidal ideation, intent or plan.   Patient is Alert and oriented in all spheres. Patient is cognitively grossly intact.   Insight and judgment are limited. Impulse control is intact at this time.    Vital signs are stable. Gait and station WNL

## 2021-05-24 PROCEDURE — 99231 SBSQ HOSP IP/OBS SF/LOW 25: CPT

## 2021-05-24 RX ADMIN — Medication 2 MILLIGRAM(S): at 17:05

## 2021-05-24 RX ADMIN — RISPERIDONE 4 MILLIGRAM(S): 4 TABLET ORAL at 09:01

## 2021-05-24 RX ADMIN — SERTRALINE 100 MILLIGRAM(S): 25 TABLET, FILM COATED ORAL at 09:00

## 2021-05-24 RX ADMIN — Medication 2 MILLIGRAM(S): at 21:05

## 2021-05-24 RX ADMIN — Medication 2 MILLIGRAM(S): at 09:00

## 2021-05-24 RX ADMIN — AMLODIPINE BESYLATE 5 MILLIGRAM(S): 2.5 TABLET ORAL at 09:01

## 2021-05-24 RX ADMIN — Medication 2 MILLIGRAM(S): at 12:50

## 2021-05-24 NOTE — BH INPATIENT PSYCHIATRY PROGRESS NOTE - CASE SUMMARY
Pt continues to be catatonic, with limited speech output.  Some improvement with initiating ambulation.  Still requires prompting and direction to eat.  Requires full assistance with toileting/diaper change.  Pt is known to be fully independent with ADLs.  Still not at baseline.  Continue meds.

## 2021-05-24 NOTE — BH INPATIENT PSYCHIATRY PROGRESS NOTE - MODIFICATIONS
Modifications were made to above trainee note where appropriate and/or are addressed below in case summary.

## 2021-05-24 NOTE — BH INPATIENT PSYCHIATRY PROGRESS NOTE - MSE UNSTRUCTURED FT
On exam today the patient is superficially cooperative, found laying in bed, sits up when prompted. Speech is soft with very few words spoken. Mood is "OK", Affect neutral-to-depressed, restricted. Thought process difficult to assess, superficially linear. Thought content: impoverished. No apparent perceptual disturbances. Poor insight and judgement. Impulse control intact at time of exam.

## 2021-05-24 NOTE — BH INPATIENT PSYCHIATRY PROGRESS NOTE - NSBHASSESSSUMMFT_PSY_ALL_CORE
Pt still less catatonic, able to speak enough to make needs known, and able ot ambulate with walker, but still not at baseline/able to engage in ADLs independently, particularly toileting.    Plan: Continue Risperidone 4mg, continue Lorazepam and Sertraline at current doses, continue CO during daytime for behavioral disorganization

## 2021-05-24 NOTE — BH INPATIENT PSYCHIATRY PROGRESS NOTE - NSBHFUPINTERVALHXFT_PSY_A_CORE
Pt seen for follow up of psychosis/catatonia. Continues to offer short one word answers to questions. No current complaints. Per staff patient has been ambulating more. Is able to eat when prompted. Still urinating in diaper (per staff has been urinating when they change his diaper).

## 2021-05-25 PROCEDURE — 99231 SBSQ HOSP IP/OBS SF/LOW 25: CPT

## 2021-05-25 RX ORDER — MEMANTINE HYDROCHLORIDE 10 MG/1
5 TABLET ORAL DAILY
Refills: 0 | Status: DISCONTINUED | OUTPATIENT
Start: 2021-05-26 | End: 2021-05-27

## 2021-05-25 RX ADMIN — Medication 2 MILLIGRAM(S): at 17:05

## 2021-05-25 RX ADMIN — Medication 2 MILLIGRAM(S): at 21:01

## 2021-05-25 RX ADMIN — RISPERIDONE 4 MILLIGRAM(S): 4 TABLET ORAL at 08:09

## 2021-05-25 RX ADMIN — Medication 2 MILLIGRAM(S): at 12:36

## 2021-05-25 RX ADMIN — AMLODIPINE BESYLATE 5 MILLIGRAM(S): 2.5 TABLET ORAL at 08:08

## 2021-05-25 RX ADMIN — SERTRALINE 100 MILLIGRAM(S): 25 TABLET, FILM COATED ORAL at 08:08

## 2021-05-25 RX ADMIN — Medication 2 MILLIGRAM(S): at 08:09

## 2021-05-25 NOTE — BH INPATIENT PSYCHIATRY PROGRESS NOTE - MSE UNSTRUCTURED FT
Dressed appropriately but disheveled.  Poorly related, no eye contact but awake.  Limited cooperation.  Speech laconic, low volume.   Mood is "ok" affect neutral and flat.  [place], bradyphrenic with limited associations.  TC: Poverty of TC.  Denies AVH.  Denies SIIP.  Insight poor, judgment limited, attention poor, gait/station: supine.

## 2021-05-25 NOTE — BH INPATIENT PSYCHIATRY PROGRESS NOTE - NSBHFUPINTERVALHXFT_PSY_A_CORE
As per staff report, continues to initiate ambulation on own and is moving around unit better.  Continues to require assistance with all other ADLs.  Interview remains limited.  Pt states he has no complaints.

## 2021-05-26 PROCEDURE — 99232 SBSQ HOSP IP/OBS MODERATE 35: CPT

## 2021-05-26 RX ADMIN — SERTRALINE 100 MILLIGRAM(S): 25 TABLET, FILM COATED ORAL at 08:57

## 2021-05-26 RX ADMIN — Medication 2 MILLIGRAM(S): at 16:56

## 2021-05-26 RX ADMIN — AMLODIPINE BESYLATE 5 MILLIGRAM(S): 2.5 TABLET ORAL at 08:56

## 2021-05-26 RX ADMIN — RISPERIDONE 4 MILLIGRAM(S): 4 TABLET ORAL at 08:57

## 2021-05-26 RX ADMIN — Medication 2 MILLIGRAM(S): at 08:56

## 2021-05-26 RX ADMIN — Medication 2 MILLIGRAM(S): at 12:54

## 2021-05-26 RX ADMIN — Medication 2 MILLIGRAM(S): at 21:21

## 2021-05-26 RX ADMIN — MEMANTINE HYDROCHLORIDE 5 MILLIGRAM(S): 10 TABLET ORAL at 08:59

## 2021-05-26 NOTE — BH INPATIENT PSYCHIATRY PROGRESS NOTE - MSE UNSTRUCTURED FT
Dressed appropriately but disheveled.  Poorly related, no eye contact.  No cooperation.  Nonverbal.  Unable assess mood.  Affect flat.  Unable assess thought process, thought content, cognition.  Insight/judgment poor.  Gait/station: supine.

## 2021-05-26 NOTE — BH INPATIENT PSYCHIATRY PROGRESS NOTE - NSBHASSESSSUMMFT_PSY_ALL_CORE
Catatonia with suspected underlying psychosis.    Will add memantine to regimen today for off label use in catatonia, given high dose benzo and no consent for ECT from family.

## 2021-05-27 PROCEDURE — 99232 SBSQ HOSP IP/OBS MODERATE 35: CPT

## 2021-05-27 RX ORDER — MEMANTINE HYDROCHLORIDE 10 MG/1
10 TABLET ORAL DAILY
Refills: 0 | Status: DISCONTINUED | OUTPATIENT
Start: 2021-05-27 | End: 2021-06-02

## 2021-05-27 RX ADMIN — Medication 2 MILLIGRAM(S): at 21:05

## 2021-05-27 RX ADMIN — Medication 2 MILLIGRAM(S): at 09:41

## 2021-05-27 RX ADMIN — MEMANTINE HYDROCHLORIDE 10 MILLIGRAM(S): 10 TABLET ORAL at 09:41

## 2021-05-27 RX ADMIN — Medication 2 MILLIGRAM(S): at 17:18

## 2021-05-27 RX ADMIN — AMLODIPINE BESYLATE 5 MILLIGRAM(S): 2.5 TABLET ORAL at 09:41

## 2021-05-27 RX ADMIN — Medication 2 MILLIGRAM(S): at 13:25

## 2021-05-27 RX ADMIN — RISPERIDONE 4 MILLIGRAM(S): 4 TABLET ORAL at 09:40

## 2021-05-27 RX ADMIN — SERTRALINE 100 MILLIGRAM(S): 25 TABLET, FILM COATED ORAL at 09:41

## 2021-05-27 NOTE — BH INPATIENT PSYCHIATRY PROGRESS NOTE - MSE UNSTRUCTURED FT
Dressed appropriately but disheveled, no eye contact, no cooperation.  Nonverbal.  Unable assess mood, thought process, thought content, cognition.  Insight/judgment poor.  Gait/station: supine.  Affect flat.

## 2021-05-27 NOTE — BH INPATIENT PSYCHIATRY PROGRESS NOTE - NSBHFUPINTERVALHXFT_PSY_A_CORE
As per CO, pt has been initiating ambulation and transfers.  Still requires prompting and assistance for toileting.  No cooperation with interview today.

## 2021-05-28 PROCEDURE — 99232 SBSQ HOSP IP/OBS MODERATE 35: CPT

## 2021-05-28 RX ORDER — MEMANTINE HYDROCHLORIDE 10 MG/1
5 TABLET ORAL AT BEDTIME
Refills: 0 | Status: DISCONTINUED | OUTPATIENT
Start: 2021-05-28 | End: 2021-06-02

## 2021-05-28 RX ADMIN — RISPERIDONE 4 MILLIGRAM(S): 4 TABLET ORAL at 08:21

## 2021-05-28 RX ADMIN — MEMANTINE HYDROCHLORIDE 5 MILLIGRAM(S): 10 TABLET ORAL at 20:20

## 2021-05-28 RX ADMIN — SERTRALINE 100 MILLIGRAM(S): 25 TABLET, FILM COATED ORAL at 08:21

## 2021-05-28 RX ADMIN — Medication 2 MILLIGRAM(S): at 08:21

## 2021-05-28 RX ADMIN — Medication 2 MILLIGRAM(S): at 13:35

## 2021-05-28 RX ADMIN — AMLODIPINE BESYLATE 5 MILLIGRAM(S): 2.5 TABLET ORAL at 08:21

## 2021-05-28 RX ADMIN — Medication 2 MILLIGRAM(S): at 20:21

## 2021-05-28 RX ADMIN — MEMANTINE HYDROCHLORIDE 10 MILLIGRAM(S): 10 TABLET ORAL at 08:21

## 2021-05-28 RX ADMIN — Medication 2 MILLIGRAM(S): at 16:55

## 2021-05-28 NOTE — BH INPATIENT PSYCHIATRY PROGRESS NOTE - NSBHASSESSSUMMFT_PSY_ALL_CORE
More interactive today but still requires assistance with most ADLs, not at baseline.    Continue CO.  Continue memantine titration.  Continue other meds.

## 2021-05-28 NOTE — BH INPATIENT PSYCHIATRY PROGRESS NOTE - MSE UNSTRUCTURED FT
Dressed appropriately but disheveled, limited cooperation, poor eye contact.  Laconic, soft volume speech.  Does not answer about mood.  Bradyphrenic thought process with limited associations.  TC: Poverty of thought content.  Insight and judgment poor, attention poor, gait/station: supine.

## 2021-05-28 NOTE — BH INPATIENT PSYCHIATRY PROGRESS NOTE - NSBHFUPINTERVALHXFT_PSY_A_CORE
As per CO, pt continues to require assistance with ambulating though will initiate.  Is able to feed self but still needs help with toileting.  Pt partially cooperative with interview.  States today that he does not know why he is here.  Denies hearing any voices.  Denies feeling too sedated or tired to make it to bathroom.

## 2021-05-29 PROCEDURE — 99232 SBSQ HOSP IP/OBS MODERATE 35: CPT

## 2021-05-29 RX ADMIN — Medication 2 MILLIGRAM(S): at 13:31

## 2021-05-29 RX ADMIN — Medication 2 MILLIGRAM(S): at 16:46

## 2021-05-29 RX ADMIN — AMLODIPINE BESYLATE 5 MILLIGRAM(S): 2.5 TABLET ORAL at 09:34

## 2021-05-29 RX ADMIN — SERTRALINE 100 MILLIGRAM(S): 25 TABLET, FILM COATED ORAL at 09:34

## 2021-05-29 RX ADMIN — MEMANTINE HYDROCHLORIDE 10 MILLIGRAM(S): 10 TABLET ORAL at 09:34

## 2021-05-29 RX ADMIN — MEMANTINE HYDROCHLORIDE 5 MILLIGRAM(S): 10 TABLET ORAL at 21:09

## 2021-05-29 RX ADMIN — Medication 2 MILLIGRAM(S): at 21:10

## 2021-05-29 RX ADMIN — RISPERIDONE 4 MILLIGRAM(S): 4 TABLET ORAL at 09:33

## 2021-05-29 RX ADMIN — Medication 2 MILLIGRAM(S): at 09:33

## 2021-05-29 NOTE — BH INPATIENT PSYCHIATRY PROGRESS NOTE - NSBHASSESSSUMMFT_PSY_ALL_CORE
catatonia gradually improving, poor insight, poor self care.  continue current treatment.  will increase CO to around the clock due to reported fall overnight without CO.

## 2021-05-29 NOTE — BH INPATIENT PSYCHIATRY PROGRESS NOTE - MSE UNSTRUCTURED FT
Patient is casually dressed, calm, cooperative, unable to engage in clinically meaningful interview, oddly-related, poor EC. +PMR. Speech is soft and slow with latency and monotone. Mood "fine" and affect is blunted, minimally reactive. TP with paucity of thought. Denies SI/HI, denies AH, + delusions. Cognition grossly intact. I&J poor.

## 2021-05-29 NOTE — BH INPATIENT PSYCHIATRY PROGRESS NOTE - NSBHFUPINTERVALHXFT_PSY_A_CORE
Patient seen for follow up of psychosis with catatonia.  Case reviewed with comprehensive treatment team.  nursing staff reports patient had an unwitnessed fall overnight.  No injuries or acute mental status changes reported.   Patient remains catatonic with limited engagement.  He is unable to give a meaningful narraitve of events preceding hospitalization.

## 2021-05-30 PROCEDURE — 99232 SBSQ HOSP IP/OBS MODERATE 35: CPT

## 2021-05-30 RX ADMIN — MEMANTINE HYDROCHLORIDE 10 MILLIGRAM(S): 10 TABLET ORAL at 08:44

## 2021-05-30 RX ADMIN — Medication 2 MILLIGRAM(S): at 12:16

## 2021-05-30 RX ADMIN — RISPERIDONE 4 MILLIGRAM(S): 4 TABLET ORAL at 08:45

## 2021-05-30 RX ADMIN — MEMANTINE HYDROCHLORIDE 5 MILLIGRAM(S): 10 TABLET ORAL at 20:38

## 2021-05-30 RX ADMIN — SERTRALINE 100 MILLIGRAM(S): 25 TABLET, FILM COATED ORAL at 08:45

## 2021-05-30 RX ADMIN — Medication 2 MILLIGRAM(S): at 17:01

## 2021-05-30 RX ADMIN — Medication 2 MILLIGRAM(S): at 20:38

## 2021-05-30 RX ADMIN — AMLODIPINE BESYLATE 5 MILLIGRAM(S): 2.5 TABLET ORAL at 08:45

## 2021-05-30 RX ADMIN — Medication 2 MILLIGRAM(S): at 08:45

## 2021-05-30 NOTE — BH INPATIENT PSYCHIATRY PROGRESS NOTE - MSE UNSTRUCTURED FT
Patient is casually dressed, calm, cooperative, unable to engage in clinically meaningful interview, oddly-related, poor EC. +PMR. Speech is soft and slow with latency and monotone. Mood "ok" and affect is blunted, minimally reactive. TP with paucity of thought. Denies SI/HI, denies AH, + delusions. Cognition grossly intact. I&J poor.

## 2021-05-30 NOTE — BH INPATIENT PSYCHIATRY PROGRESS NOTE - NSBHFUPINTERVALHXFT_PSY_A_CORE
Patient seen for follow up of psychosis with catatonia.  Case reviewed with comprehensive treatment team.  No behavioral events of falls overnight.  Patient remains catatonic with limited engagement.  He is unable to give a meaningful narrative of events preceding hospitalization.  But he is able to have appropriate conversation about his favorite sports teams and players. And he was eager to read the sports section of a newspaper.

## 2021-05-31 PROCEDURE — 99232 SBSQ HOSP IP/OBS MODERATE 35: CPT

## 2021-05-31 RX ADMIN — Medication 2 MILLIGRAM(S): at 20:37

## 2021-05-31 RX ADMIN — Medication 2 MILLIGRAM(S): at 08:40

## 2021-05-31 RX ADMIN — RISPERIDONE 4 MILLIGRAM(S): 4 TABLET ORAL at 08:41

## 2021-05-31 RX ADMIN — AMLODIPINE BESYLATE 5 MILLIGRAM(S): 2.5 TABLET ORAL at 08:41

## 2021-05-31 RX ADMIN — MEMANTINE HYDROCHLORIDE 5 MILLIGRAM(S): 10 TABLET ORAL at 20:37

## 2021-05-31 RX ADMIN — MEMANTINE HYDROCHLORIDE 10 MILLIGRAM(S): 10 TABLET ORAL at 08:41

## 2021-05-31 RX ADMIN — Medication 2 MILLIGRAM(S): at 16:52

## 2021-05-31 RX ADMIN — SERTRALINE 100 MILLIGRAM(S): 25 TABLET, FILM COATED ORAL at 08:41

## 2021-05-31 RX ADMIN — Medication 2 MILLIGRAM(S): at 12:51

## 2021-05-31 NOTE — BH INPATIENT PSYCHIATRY PROGRESS NOTE - NSBHFUPINTERVALHXFT_PSY_A_CORE
Patient seen for follow up of psychosis with catatonia.  Case reviewed with comprehensive treatment team.  No behavioral events or falls overnight.  Patient remains catatonic with limited engagement.  He is unable to give a meaningful narrative of events preceding hospitalization.  But he is able to have appropriate conversation about his favorite sports teams and read the sports section of the newspaper this morning.  Patient states he wants to go home and is frustrated remaining in the hospital.

## 2021-05-31 NOTE — BH INPATIENT PSYCHIATRY PROGRESS NOTE - MSE UNSTRUCTURED FT
Patient is casually dressed, calm, cooperative, unable to engage in clinically meaningful interview, oddly-related, poor EC. +PMR. Speech is soft and slow with latency and monotone. Mood "frustrated" and affect is blunted, minimally reactive. TP with paucity of thought. Denies SI/HI, denies AH, + delusions. Cognition grossly intact. I&J poor.

## 2021-06-01 PROCEDURE — 99231 SBSQ HOSP IP/OBS SF/LOW 25: CPT

## 2021-06-01 RX ADMIN — SERTRALINE 100 MILLIGRAM(S): 25 TABLET, FILM COATED ORAL at 09:00

## 2021-06-01 RX ADMIN — AMLODIPINE BESYLATE 5 MILLIGRAM(S): 2.5 TABLET ORAL at 09:01

## 2021-06-01 RX ADMIN — Medication 2 MILLIGRAM(S): at 21:46

## 2021-06-01 RX ADMIN — Medication 2 MILLIGRAM(S): at 12:52

## 2021-06-01 RX ADMIN — MEMANTINE HYDROCHLORIDE 10 MILLIGRAM(S): 10 TABLET ORAL at 09:01

## 2021-06-01 RX ADMIN — Medication 2 MILLIGRAM(S): at 17:27

## 2021-06-01 RX ADMIN — Medication 2 MILLIGRAM(S): at 09:01

## 2021-06-01 RX ADMIN — MEMANTINE HYDROCHLORIDE 5 MILLIGRAM(S): 10 TABLET ORAL at 21:46

## 2021-06-01 RX ADMIN — RISPERIDONE 4 MILLIGRAM(S): 4 TABLET ORAL at 09:01

## 2021-06-01 NOTE — BH INPATIENT PSYCHIATRY PROGRESS NOTE - MSE UNSTRUCTURED FT
Patient is wearing a hospital gown, calm, somewhat  cooperative with fair eye contact, unable to engage in clinically meaningful interview, oddly-related, poor EC. +PMR. Speech is soft and slow with latency and monotone. Affect is blunted, minimally reactive and flat. TP with paucity of thought. Denies SI/HI, denies AH, + delusions. Cognition grossly intact. I&J poor.

## 2021-06-01 NOTE — BH INPATIENT PSYCHIATRY PROGRESS NOTE - NSBHASSESSSUMMFT_PSY_ALL_CORE
Continues to exhibit catatonic features though staff reports gradual improvement over time    Continue current medications.

## 2021-06-02 PROCEDURE — 99232 SBSQ HOSP IP/OBS MODERATE 35: CPT

## 2021-06-02 RX ORDER — MEMANTINE HYDROCHLORIDE 10 MG/1
10 TABLET ORAL
Refills: 0 | Status: DISCONTINUED | OUTPATIENT
Start: 2021-06-02 | End: 2021-07-19

## 2021-06-02 RX ADMIN — MEMANTINE HYDROCHLORIDE 10 MILLIGRAM(S): 10 TABLET ORAL at 09:46

## 2021-06-02 RX ADMIN — Medication 2 MILLIGRAM(S): at 12:50

## 2021-06-02 RX ADMIN — AMLODIPINE BESYLATE 5 MILLIGRAM(S): 2.5 TABLET ORAL at 09:47

## 2021-06-02 RX ADMIN — Medication 2 MILLIGRAM(S): at 20:19

## 2021-06-02 RX ADMIN — SERTRALINE 100 MILLIGRAM(S): 25 TABLET, FILM COATED ORAL at 09:46

## 2021-06-02 RX ADMIN — MEMANTINE HYDROCHLORIDE 10 MILLIGRAM(S): 10 TABLET ORAL at 20:19

## 2021-06-02 RX ADMIN — Medication 2 MILLIGRAM(S): at 18:15

## 2021-06-02 RX ADMIN — RISPERIDONE 4 MILLIGRAM(S): 4 TABLET ORAL at 09:47

## 2021-06-02 NOTE — BH INPATIENT PSYCHIATRY PROGRESS NOTE - NSBHASSESSSUMMFT_PSY_ALL_CORE
Continues to exhibit catatonic features though staff reports gradual improvement over time    Continue current medications. Continues to exhibit catatonic features has had gradual improvement over time, still needing assistance with ADL's.     Plan to taper Ativan by 0.5 mg off of each Ativan dose per day with goal of reaching 1 mg QID to see if patient may be less sedated. Will monitor for worsening of catatonia. Missed morning dose of Ativan 2 mg on 6/2/21. On 6/3/21, scheduled for 1.5 mg at 8:00 AM, and then 2 mg at 12:00, 16:00 and 20:00.     Continue 24 hour CO 1:1 (had recent fall overnight)   Continues to exhibit catatonic features has had gradual improvement over time, still needing assistance with ADL's.     Plan to taper Ativan by 0.5 mg off of each Ativan dose per day with goal of reaching 1 mg QID to see if patient may be less sedated. Will monitor for worsening of catatonia. Missed morning dose of Ativan 2 mg on 6/2/21. On 6/3/21, scheduled for 1.5 mg at 8:00 and 12:00, 2 mg at 16:00 and 20:00.     Continue 24 hour CO 1:1 (had recent fall overnight)

## 2021-06-02 NOTE — BH INPATIENT PSYCHIATRY PROGRESS NOTE - MSE UNSTRUCTURED FT
Patient is wearing a hospital gown, disheveled with poor hygiene, calm, cooperative poor eye contact mostly looking down, poorly-related, +PMR. Speech is soft and slow with latency and monotone. Affect is blunted, minimally reactive and flat. TP with paucity of thought. Denies SI/HI, denies AH, + delusions. Cognition grossly intact. I&J poor.   Patient is wearing a hospital gown, disheveled with poor hygiene, calm, cooperative poor eye contact mostly looking down, poorly-related, +PMR. Speech is soft and slow with latency and monotone. Affect is blunted, minimally reactive and flat. TP with paucity of thought, no delusional content elicited. Denies SI/HI, denies AH. Cognition grossly intact. I&J poor.

## 2021-06-02 NOTE — BH INPATIENT PSYCHIATRY PROGRESS NOTE - NSBHFUPINTERVALHXFT_PSY_A_CORE
Pt was found on his bed sleeping, aroursable to verbal stimuli. Sat and up and able to participate in interview mostly with one word answers. Denies current complaints, states that he wants to go home. Says that home activity is "shopping." Says that he wants to go back to live with his parents but has not spoken with them. Endorses being hungry and says that he wants breakfast. Otherwise no current complaints.  Pt was found on his bed sleeping, arousable to verbal stimuli. Sat and up and able to participate in interview mostly with one word answers. Denies current complaints, states that he wants to go home. Says that home activity is "shopping." Says that he wants to go back to live with his parents but has not spoken with them. Endorses being hungry and says that he wants breakfast. Otherwise no current complaints.

## 2021-06-02 NOTE — BH INPATIENT PSYCHIATRY PROGRESS NOTE - CASE SUMMARY
Improvement in ADLs appears to have plateaued.  Suspect some hindrance from oversedation due to lorazepam.  Will slowly taper dose and monitor.  Continue CO for now.

## 2021-06-02 NOTE — BH INPATIENT PSYCHIATRY PROGRESS NOTE - NSBHCONSBHPROVCNTCTNOFT_PSY_A_CORE
after hours 

## 2021-06-03 PROCEDURE — 99232 SBSQ HOSP IP/OBS MODERATE 35: CPT

## 2021-06-03 RX ADMIN — MEMANTINE HYDROCHLORIDE 10 MILLIGRAM(S): 10 TABLET ORAL at 08:33

## 2021-06-03 RX ADMIN — Medication 1.5 MILLIGRAM(S): at 16:49

## 2021-06-03 RX ADMIN — Medication 1.5 MILLIGRAM(S): at 12:20

## 2021-06-03 RX ADMIN — AMLODIPINE BESYLATE 5 MILLIGRAM(S): 2.5 TABLET ORAL at 08:33

## 2021-06-03 RX ADMIN — Medication 1.5 MILLIGRAM(S): at 20:26

## 2021-06-03 RX ADMIN — SERTRALINE 100 MILLIGRAM(S): 25 TABLET, FILM COATED ORAL at 08:34

## 2021-06-03 RX ADMIN — MEMANTINE HYDROCHLORIDE 10 MILLIGRAM(S): 10 TABLET ORAL at 20:26

## 2021-06-03 RX ADMIN — RISPERIDONE 4 MILLIGRAM(S): 4 TABLET ORAL at 08:34

## 2021-06-03 RX ADMIN — Medication 1.5 MILLIGRAM(S): at 08:34

## 2021-06-03 NOTE — UM REPORT PROGRESS NOTE - NSUMSWNOTE_GEN_A_CORE FT
Overall, pt has demonstrated progress in demonstrating the ability to care for self.  Pt's ADLs are improving with assistance.  Pt is able to feed himself and use walker to ambulate more often.  Overall, pt has demonstrated progress in demonstrating the ability to care for self.  Pt's ADLs are improving with assistance.  Pt is able to feed himself and use walker to ambulate more often. Tx team advised by family pt's mother is planned to go to an assisted living facility therefore pt is unable to return home if that is the plan. To continue with plan for SNF referral.

## 2021-06-03 NOTE — BH INPATIENT PSYCHIATRY PROGRESS NOTE - NSBHFUPINTERVALHXFT_PSY_A_CORE
Pt found up and eating breakfast. More verbal compared to prior days, now uses several full sentences. Says that the food isn't so good. Asks for clothes to wear for the week. Otherwise no current complaints/concerns.

## 2021-06-03 NOTE — BH INPATIENT PSYCHIATRY PROGRESS NOTE - NSBHASSESSSUMMFT_PSY_ALL_CORE
Catatonic features continue to improve, now with more mobility, less staring, and less withdrawn. Eating breakfast on own volition and requesting clothes other than hospital gown.    Will continue Ativan taper, 1.5 mg twice and 2 mg twice on 6/3/21, then 1.5 mg four times per day on 6/4/21.       Continue 24 hour CO 1:1 (had recent fall overnight, remains unsteady on feet)

## 2021-06-03 NOTE — UM REPORT PROGRESS NOTE - NSUMSWACTION_GEN_A_CORE FT
[facility] was requested and obtained, currently working on [facility] level II to determine if patient meets criteria for sub acute rehab for deconditioning- hx of catatonic features. Writer spoke with pts mother yesterday, update provided and she is in agreement with plan for sub acute rehab.  [facility] revisions were requested and re-submitted yesterday, currently working on [facility] level II to determine if patient meets criteria for sub acute rehab for deconditioning- hx of catatonic features. Writer has kept family abreast of plan. [facility] Level 2 packet was submitted on 6/25, contact made with Shashi Taylor yesterday and today, she states the case has not yet been signed out; writer will await call back to schedule interview. Writer has kept family abreast of plan. [facility] Level 2 packet was submitted on 6/25 and later approved. Today with the assistance of the  dispo planning office, referrals were sent to facilities in [place]. At this time, patient is only interested in [place] placements. Pt also signed the screen today and was given a copy of his [facility] paperwork. Pt remains in agreement with short term rehab placement.

## 2021-06-03 NOTE — BH INPATIENT PSYCHIATRY PROGRESS NOTE - CASE SUMMARY
Improvement in ADLs appears to have plateaued.  Suspect some hindrance from oversedation due to lorazepam.  Will slowly taper dose and monitor.  Continue CO for now. Pt is more talkative today.  States he would like clothes.  Still needs assistance with toileting but as per staff occasionally is able to do on own.  Overnight pt was very mobile and ambulating through unit.    CO discontinued as per Chart Note filed today.      Continue lorazepam taper to target dose 1 mg tid-qid.  Continue remainder of medications as ordered.

## 2021-06-03 NOTE — BH CHART NOTE - NSEVENTNOTEFT_PSY_ALL_CORE
Discussed obs status with staff.  As pt appears to be able to do a little more on own this week and is more interactive, will d/c CO and monitor.  Pt on toileting schedule and has call bell.

## 2021-06-03 NOTE — BH INPATIENT PSYCHIATRY PROGRESS NOTE - MSE UNSTRUCTURED FT
Patient is wearing a hospital gown, disheveled with poor hygiene, calm, cooperative, sitting at desk is room eating breakfast, poor eye contact mostly looking at food he is eating, +PMR. Speech is soft and slow with latency and monotone, increased speech productivity, now using several full sentences. Affect is blunted, minimally reactive. TP with paucity of thought. TC: requests clothes, no delusional content elicited. No SI/HI elicited, denies AH. Cognition grossly intact. Insight poor, judgement fair.

## 2021-06-04 PROCEDURE — 99231 SBSQ HOSP IP/OBS SF/LOW 25: CPT

## 2021-06-04 RX ADMIN — AMLODIPINE BESYLATE 5 MILLIGRAM(S): 2.5 TABLET ORAL at 08:48

## 2021-06-04 RX ADMIN — Medication 1.5 MILLIGRAM(S): at 20:15

## 2021-06-04 RX ADMIN — SERTRALINE 100 MILLIGRAM(S): 25 TABLET, FILM COATED ORAL at 08:48

## 2021-06-04 RX ADMIN — MEMANTINE HYDROCHLORIDE 10 MILLIGRAM(S): 10 TABLET ORAL at 20:15

## 2021-06-04 RX ADMIN — RISPERIDONE 4 MILLIGRAM(S): 4 TABLET ORAL at 08:48

## 2021-06-04 RX ADMIN — Medication 1.5 MILLIGRAM(S): at 12:47

## 2021-06-04 RX ADMIN — MEMANTINE HYDROCHLORIDE 10 MILLIGRAM(S): 10 TABLET ORAL at 08:48

## 2021-06-04 RX ADMIN — Medication 1.5 MILLIGRAM(S): at 17:03

## 2021-06-04 RX ADMIN — Medication 1.5 MILLIGRAM(S): at 08:49

## 2021-06-04 NOTE — BH INPATIENT PSYCHIATRY PROGRESS NOTE - CASE SUMMARY
Pt is more talkative today.  States he would like clothes.  Still needs assistance with toileting but as per staff occasionally is able to do on own.  Overnight pt was very mobile and ambulating through unit.    CO discontinued as per Chart Note filed today.      Continue lorazepam taper to target dose 1 mg tid-qid.  Continue remainder of medications as ordered. Pt is more alert, more conversational.  He asks for clothes.  He is observed at RN station making needs known.  Other ADLs remain limited - emmanuel hygiene, grooming, toileting.  Pt states he will make more of an effort with these ADLs.  Continue slow taper of lorazepam, watch for rebound catatonia.

## 2021-06-04 NOTE — BH INPATIENT PSYCHIATRY PROGRESS NOTE - MSE UNSTRUCTURED FT
Patient is wearing a hospital gown, disheveled with poor hygiene, calm, cooperative, laying in bed supine, poor eye contact mostly looking at ceiling, +PMR. Speech is soft and slow with latency, tone less monotonous with increased speech productivity, now using more full sentences. Affect is blunted, minimally but slightly more reactive. TP with paucity of thought, TC: requests clothes,  increasingly discharge focused. No delusional content elicited. No SI/HI elicited, denies AH. Cognition grossly intact. Insight poor, judgement fair.

## 2021-06-04 NOTE — BH INPATIENT PSYCHIATRY PROGRESS NOTE - NSBHFUPINTERVALHXFT_PSY_A_CORE
No acute events overnight. Patient noted by staff to be attempting to use his walker but hold it backwards. In interview patient awake laying in bed. He complains of not having any clothes to wear other than his hospital gown (had been given clothes by staff yesterday after requesting but never put them on). Also complaints that the food is no good here, and says that he wants to go home. States that he doesn't remember being catatonic when he was first admitted, but expresses understanding that treatment team would like to see more improvement prior to discharge. Otherwise no current complaints/concerns.

## 2021-06-04 NOTE — BH INPATIENT PSYCHIATRY PROGRESS NOTE - NSBHASSESSSUMMFT_PSY_ALL_CORE
Still requiring assistance with ADL's, but catatonic features continue to modestly improve, increasingly with more mobility, less staring, and less withdrawn. Eating breakfast on own volition and requesting clothes other than hospital gown, though has not changed into them when offered.     Will pause Ativan taper to maintain current improvement and minimize risk of rebound catatonia. Now Ativan 1.5 mg four times per day, to be continued over weekend days 6/5 and 6/6.     Discontinued CO 1:1, now Q15 checks with toileting schedule as per nursing staff.

## 2021-06-05 RX ADMIN — Medication 1.5 MILLIGRAM(S): at 13:16

## 2021-06-05 RX ADMIN — SERTRALINE 100 MILLIGRAM(S): 25 TABLET, FILM COATED ORAL at 09:12

## 2021-06-05 RX ADMIN — MEMANTINE HYDROCHLORIDE 10 MILLIGRAM(S): 10 TABLET ORAL at 09:12

## 2021-06-05 RX ADMIN — AMLODIPINE BESYLATE 5 MILLIGRAM(S): 2.5 TABLET ORAL at 09:13

## 2021-06-05 RX ADMIN — Medication 1.5 MILLIGRAM(S): at 17:46

## 2021-06-05 RX ADMIN — Medication 1.5 MILLIGRAM(S): at 09:13

## 2021-06-05 RX ADMIN — MEMANTINE HYDROCHLORIDE 10 MILLIGRAM(S): 10 TABLET ORAL at 20:20

## 2021-06-05 RX ADMIN — Medication 1.5 MILLIGRAM(S): at 20:20

## 2021-06-05 RX ADMIN — RISPERIDONE 4 MILLIGRAM(S): 4 TABLET ORAL at 09:13

## 2021-06-06 RX ADMIN — AMLODIPINE BESYLATE 5 MILLIGRAM(S): 2.5 TABLET ORAL at 09:06

## 2021-06-06 RX ADMIN — MEMANTINE HYDROCHLORIDE 10 MILLIGRAM(S): 10 TABLET ORAL at 20:34

## 2021-06-06 RX ADMIN — MEMANTINE HYDROCHLORIDE 10 MILLIGRAM(S): 10 TABLET ORAL at 09:06

## 2021-06-06 RX ADMIN — Medication 1.5 MILLIGRAM(S): at 09:06

## 2021-06-06 RX ADMIN — Medication 1.5 MILLIGRAM(S): at 16:48

## 2021-06-06 RX ADMIN — RISPERIDONE 4 MILLIGRAM(S): 4 TABLET ORAL at 09:06

## 2021-06-06 RX ADMIN — SERTRALINE 100 MILLIGRAM(S): 25 TABLET, FILM COATED ORAL at 09:05

## 2021-06-06 RX ADMIN — Medication 1.5 MILLIGRAM(S): at 12:51

## 2021-06-06 RX ADMIN — Medication 1.5 MILLIGRAM(S): at 20:33

## 2021-06-07 PROCEDURE — 99231 SBSQ HOSP IP/OBS SF/LOW 25: CPT

## 2021-06-07 RX ADMIN — Medication 1 MILLIGRAM(S): at 08:20

## 2021-06-07 RX ADMIN — SERTRALINE 100 MILLIGRAM(S): 25 TABLET, FILM COATED ORAL at 08:19

## 2021-06-07 RX ADMIN — Medication 1.5 MILLIGRAM(S): at 17:42

## 2021-06-07 RX ADMIN — MEMANTINE HYDROCHLORIDE 10 MILLIGRAM(S): 10 TABLET ORAL at 08:17

## 2021-06-07 RX ADMIN — Medication 1 MILLIGRAM(S): at 13:22

## 2021-06-07 RX ADMIN — Medication 1.5 MILLIGRAM(S): at 20:57

## 2021-06-07 RX ADMIN — AMLODIPINE BESYLATE 5 MILLIGRAM(S): 2.5 TABLET ORAL at 08:19

## 2021-06-07 RX ADMIN — RISPERIDONE 4 MILLIGRAM(S): 4 TABLET ORAL at 08:19

## 2021-06-07 RX ADMIN — MEMANTINE HYDROCHLORIDE 10 MILLIGRAM(S): 10 TABLET ORAL at 20:57

## 2021-06-07 NOTE — BH INPATIENT PSYCHIATRY PROGRESS NOTE - MSE UNSTRUCTURED FT
Patient is a 45 year old male, appears significantly older than current age, wearing casual clothes, appears disheveled with poor hygiene including significant dandruff on head and in beard, calm, cooperative, sitting at table in dayroom eating breakfast, poor eye contact mostly looking at food. Remains with PMR but less than prior. Speech is soft and slow with latency, tone monotonous modestly increased speech productivity, continues using more full sentences. Affect is slightly less blunted, continues to have modest improvement in reactivity. TP with paucity of thought, TC: requests clothes,  increasingly discharge focused. No delusional content elicited. No SI/HI elicited, denies AH. Cognition grossly intact. Insight poor, judgement fair.

## 2021-06-07 NOTE — BH INPATIENT PSYCHIATRY PROGRESS NOTE - NSBHASSESSSUMMFT_PSY_ALL_CORE
Catatonic features continue to improve, continues to have more mobility, less staring, and less withdrawn, affect more reactive. Still requiring assistance with ADL's, especially toileting.     Ativan taper. Now Ativan, total of 4x per day,  1.0 twice per day and 1.5 mg twice per day.     Q15 checks with toileting schedule as per nursing staff.

## 2021-06-07 NOTE — BH INPATIENT PSYCHIATRY PROGRESS NOTE - NSBHFUPINTERVALHXFT_PSY_A_CORE
No acute events overnight or over weekend. Patient still noted by staff to be attempting to use his walker but hold it backwards at times and still at times is soiling himself. However, patient spending more time out of bed. In interview today was found to be out of room eating breakfast in day room, and then went to watch TV in dayroom. No longer wearing hospital gown (now wearing clothes given to him on unit). In interview patient asks for clothes, otherwise no complaints. Says that he did not speak with family over weekend, says he will call them today. Denies AVH, paranoid ideation. Denies SI/HI.

## 2021-06-07 NOTE — BH INPATIENT PSYCHIATRY PROGRESS NOTE - CASE SUMMARY
Pt is more alert, more conversational.  He asks for clothes.  He is observed at RN station making needs known.  Other ADLs remain limited - emmanuel hygiene, grooming, toileting.  Pt states he will make more of an effort with these ADLs.  Continue slow taper of lorazepam, watch for rebound catatonia. Pt is conversational today, ambulatory on unit, eating in dining area with pts, visible on unit watching television.    Still is not able to toilet on his own.  At times ambulating with walker backwards.      Has improved further, however still deficient beyond baseline with regards to toileting.  Given no improvement on this, will consider disposition options given elderly parents (one medically admitted currently) with limited ability to provide skilled needs.    Continue lorazepam taper.

## 2021-06-08 PROCEDURE — 99231 SBSQ HOSP IP/OBS SF/LOW 25: CPT

## 2021-06-08 RX ADMIN — Medication 1 MILLIGRAM(S): at 08:25

## 2021-06-08 RX ADMIN — AMLODIPINE BESYLATE 5 MILLIGRAM(S): 2.5 TABLET ORAL at 08:25

## 2021-06-08 RX ADMIN — Medication 1 MILLIGRAM(S): at 12:10

## 2021-06-08 RX ADMIN — RISPERIDONE 4 MILLIGRAM(S): 4 TABLET ORAL at 08:25

## 2021-06-08 RX ADMIN — Medication 1.5 MILLIGRAM(S): at 20:34

## 2021-06-08 RX ADMIN — Medication 1.5 MILLIGRAM(S): at 17:21

## 2021-06-08 RX ADMIN — MEMANTINE HYDROCHLORIDE 10 MILLIGRAM(S): 10 TABLET ORAL at 08:26

## 2021-06-08 RX ADMIN — SERTRALINE 100 MILLIGRAM(S): 25 TABLET, FILM COATED ORAL at 08:25

## 2021-06-08 RX ADMIN — MEMANTINE HYDROCHLORIDE 10 MILLIGRAM(S): 10 TABLET ORAL at 20:34

## 2021-06-08 NOTE — BH INPATIENT PSYCHIATRY PROGRESS NOTE - CASE SUMMARY
Pt is conversational today, ambulatory on unit, eating in dining area with pts, visible on unit watching television.    Still is not able to toilet on his own.  At times ambulating with walker backwards.      Has improved further, however still deficient beyond baseline with regards to toileting.  Given no improvement on this, will consider disposition options given elderly parents (one medically admitted currently) with limited ability to provide skilled needs.    Continue lorazepam taper. Pt today took shower with staff support.  Continues to be more visible on unit and more verbal.  Somewhat disorganized in thought process, limited insight.    Continue taper of lorazepam.  Continue to encourage ADLs.  Improving but still not at baseline.  Unclear dispo given limited supports in home.

## 2021-06-08 NOTE — BH INPATIENT PSYCHIATRY PROGRESS NOTE - NSBHFUPINTERVALHXFT_PSY_A_CORE
No acute events overnight. Patient this morning active on unit, eating breakfast and walking to phone with walker to make a call. In interview he is found laying in bed. States that he would like to go home soon. Regarding events leading to hospitalization he states that he was at home trying to find his clothes, and that his parents thought that he should come to the hospital because he wasn't helping out as much around the house. Says that he came here to "clean my brain," and that he was being "lazy" at home prior to admission.

## 2021-06-08 NOTE — BH INPATIENT PSYCHIATRY PROGRESS NOTE - NSBHASSESSSUMMFT_PSY_ALL_CORE
Catatonic features continue to improve, continues to have more mobility, less staring, and less withdrawn, affect more reactive, less rigid. Still requiring assistance with ADL's, especially toileting.     Ativan taper. Now Ativan, total of 4x per day,  1.0 twice per day and 1.5 mg twice per day, decrease to 1 mg four times per day on 6/9/21    Q15 checks with toileting schedule as per nursing staff.

## 2021-06-08 NOTE — BH INPATIENT PSYCHIATRY PROGRESS NOTE - MSE UNSTRUCTURED FT
Patient is a 45 year old male, appears significantly older than current age, wearing casual clothes, appears disheveled with poor hygiene including significant dandruff on head and in beard and poor dentition, calm, cooperative, sitting laying in bed supine, poor eye contact mostly looking at ceiling. PMR improving. Speech is soft and slow with latency, tone less but still monotonous, increasing speech productivity. Affect is less but still blunted, TP with paucity of thought, superfically linear and goal directed. TC: Increasingly discharge focused. No delusional content elicited. No SI/HI elicited, denies AH. Cognition grossly intact. Insight poor, judgement fair.

## 2021-06-09 PROCEDURE — 99232 SBSQ HOSP IP/OBS MODERATE 35: CPT

## 2021-06-09 RX ADMIN — RISPERIDONE 4 MILLIGRAM(S): 4 TABLET ORAL at 08:42

## 2021-06-09 RX ADMIN — MEMANTINE HYDROCHLORIDE 10 MILLIGRAM(S): 10 TABLET ORAL at 20:18

## 2021-06-09 RX ADMIN — Medication 1 MILLIGRAM(S): at 13:29

## 2021-06-09 RX ADMIN — AMLODIPINE BESYLATE 5 MILLIGRAM(S): 2.5 TABLET ORAL at 08:42

## 2021-06-09 RX ADMIN — SERTRALINE 100 MILLIGRAM(S): 25 TABLET, FILM COATED ORAL at 08:42

## 2021-06-09 RX ADMIN — Medication 1 MILLIGRAM(S): at 17:19

## 2021-06-09 RX ADMIN — Medication 1 MILLIGRAM(S): at 08:41

## 2021-06-09 RX ADMIN — MEMANTINE HYDROCHLORIDE 10 MILLIGRAM(S): 10 TABLET ORAL at 08:42

## 2021-06-09 NOTE — BH INPATIENT PSYCHIATRY PROGRESS NOTE - NSBHFUPINTERVALHXFT_PSY_A_CORE
No acute events overnight. Yesterday patient showed with assistance from staff at got a haircut on the unit. In interview he sits up in bed and is more interactive. States that he would like to go home soon. Spoke to his mom yesterday who informed him that his dad is in the hospital for heart problems. Knows the month and year, not the day of the week or day of the month. Knows that [place] is president and is happy that he beat Trump.

## 2021-06-09 NOTE — BH INPATIENT PSYCHIATRY PROGRESS NOTE - MSE UNSTRUCTURED FT
Patient is a 45 year old male, appears closer to current age after shower, shave and haircut, wearing casual clothes, fair hygiene and grooming, calm, cooperative, sitting up in bed. Fair poor eye contact. PMR improving. Speech is soft and slow with less latency, tone increasingly less monotonous, increasing speech productivity. Affect is blunted but more reactive. TP with paucity of thought, superficially linear and goal directed. TC: Wants to go home. No delusional content elicited. No SI/HI elicited, denies AH. Cognition grossly intact. Insight poor, judgement fair.

## 2021-06-09 NOTE — BH INPATIENT PSYCHIATRY PROGRESS NOTE - CASE SUMMARY
Pt today took shower with staff support.  Continues to be more visible on unit and more verbal.  Somewhat disorganized in thought process, limited insight.    Continue taper of lorazepam.  Continue to encourage ADLs.  Improving but still not at baseline.  Unclear dispo given limited supports in home. Pt today more interactive.  Tolerating benzo taper without any significant rebound catatonia.  Continue remainder of medications as ordered.

## 2021-06-09 NOTE — BH INPATIENT PSYCHIATRY PROGRESS NOTE - NSBHASSESSSUMMFT_PSY_ALL_CORE
Catatonic features continue to improve, continues to have more mobility, less staring, and less withdrawn, affect more reactive, less rigid. Still requiring assistance with ADL's.     Ativan taper. Now Ativan, 1 mg four times per day (6/9/21)    Q15 checks with toileting schedule as per nursing staff.

## 2021-06-10 PROCEDURE — 99231 SBSQ HOSP IP/OBS SF/LOW 25: CPT

## 2021-06-10 RX ADMIN — AMLODIPINE BESYLATE 5 MILLIGRAM(S): 2.5 TABLET ORAL at 10:59

## 2021-06-10 RX ADMIN — Medication 1 MILLIGRAM(S): at 20:38

## 2021-06-10 RX ADMIN — Medication 1 MILLIGRAM(S): at 10:58

## 2021-06-10 RX ADMIN — RISPERIDONE 4 MILLIGRAM(S): 4 TABLET ORAL at 10:58

## 2021-06-10 RX ADMIN — MEMANTINE HYDROCHLORIDE 10 MILLIGRAM(S): 10 TABLET ORAL at 10:58

## 2021-06-10 RX ADMIN — SERTRALINE 100 MILLIGRAM(S): 25 TABLET, FILM COATED ORAL at 10:58

## 2021-06-10 RX ADMIN — MEMANTINE HYDROCHLORIDE 10 MILLIGRAM(S): 10 TABLET ORAL at 20:38

## 2021-06-10 RX ADMIN — Medication 1 MILLIGRAM(S): at 14:50

## 2021-06-10 NOTE — BH INPATIENT PSYCHIATRY PROGRESS NOTE - CASE SUMMARY
Pt today more interactive.  Tolerating benzo taper without any significant rebound catatonia.  Continue remainder of medications as ordered. Pt today has brighter affect.  Wearing own clothes.  Hygiene/grooming/toileting/showering still requiring assistance.    Continue meds for now.

## 2021-06-10 NOTE — BH INPATIENT PSYCHIATRY PROGRESS NOTE - NSBHASSESSSUMMFT_PSY_ALL_CORE
Catatonic features continue to improve, continues to have more mobility, less staring, and less withdrawn, affect more reactive, less rigid. Still requiring assistance with ADL's including toileting.     Ativan taper. For now continue Ativan, 1 mg four times per day (6/9/21).    Q15 checks with toileting schedule as per nursing staff.

## 2021-06-10 NOTE — BH INPATIENT PSYCHIATRY PROGRESS NOTE - NSBHFUPINTERVALHXFT_PSY_A_CORE
No acute events overnight. Patient reports that he is feeling good today and would like to go home. Has been speaking with mom on phone. Spends lots of time in bed but also at times is active on unit, watching TV and eating in dayroom. Still requiring assistance with toileting.

## 2021-06-10 NOTE — BH INPATIENT PSYCHIATRY PROGRESS NOTE - MSE UNSTRUCTURED FT
Patient is a 45 year old male, wearing casual clothes, fair hygiene and grooming, calm, cooperative, laying in bed supine. Fair eye contact. PMR improving. Speech is soft and slow with less latency, tone increasingly less monotonous, increasing speech productivity. Affect is blunted but more reactive. TP with paucity of thought, superficially linear and goal directed. TC: Wants to go home. No delusional content elicited. No SI/HI elicited, denies AH. Cognition grossly intact. Insight poor, judgement fair.

## 2021-06-11 PROCEDURE — 99231 SBSQ HOSP IP/OBS SF/LOW 25: CPT

## 2021-06-11 RX ADMIN — AMLODIPINE BESYLATE 5 MILLIGRAM(S): 2.5 TABLET ORAL at 08:16

## 2021-06-11 RX ADMIN — SERTRALINE 100 MILLIGRAM(S): 25 TABLET, FILM COATED ORAL at 08:16

## 2021-06-11 RX ADMIN — Medication 1 MILLIGRAM(S): at 13:09

## 2021-06-11 RX ADMIN — Medication 1 MILLIGRAM(S): at 17:26

## 2021-06-11 RX ADMIN — MEMANTINE HYDROCHLORIDE 10 MILLIGRAM(S): 10 TABLET ORAL at 08:16

## 2021-06-11 RX ADMIN — Medication 1 MILLIGRAM(S): at 20:38

## 2021-06-11 RX ADMIN — Medication 1 MILLIGRAM(S): at 08:16

## 2021-06-11 RX ADMIN — MEMANTINE HYDROCHLORIDE 10 MILLIGRAM(S): 10 TABLET ORAL at 20:39

## 2021-06-11 RX ADMIN — RISPERIDONE 4 MILLIGRAM(S): 4 TABLET ORAL at 08:16

## 2021-06-11 NOTE — BH INPATIENT PSYCHIATRY PROGRESS NOTE - MSE UNSTRUCTURED FT
Patient is a 45 year old male, wearing casual clothes, fair hygiene and grooming, calm, cooperative, sitting on side of bed. Fair eye contact. PMR improving. Speech is soft and slow with less latency, tone increasingly less monotonous, increasing speech productivity. Affect less blunted and continue to be more reactive. TP with paucity of thought, superficially linear and goal directed. TC: Wants to go home. No delusional content elicited. No SI/HI elicited, denies AH. Cognition grossly intact. Insight poor, judgement fair.

## 2021-06-11 NOTE — BH INPATIENT PSYCHIATRY PROGRESS NOTE - CASE SUMMARY
Pt today has brighter affect.  Wearing own clothes.  Hygiene/grooming/toileting/showering still requiring assistance.    Continue meds for now.   Continue reducing lorazepam as tolerated, target dose 1 mg tid.     Continue rest of meds as ordered.  Will follow up with PT re: needs post d/c.

## 2021-06-11 NOTE — BH INPATIENT PSYCHIATRY PROGRESS NOTE - NSBHFUPINTERVALHXFT_PSY_A_CORE
No acute events overnight. Patient reports that he is feeling good today. Prior to interview was eating breakfast and watching TV in the dayroom. Says that he likes to watch cartoons. Asks when can be discharged. Still using walker and requiring assistance with toileting and showering. Says that prior to decompensation + hospitalization was ambulating independently without a walker.

## 2021-06-11 NOTE — BH INPATIENT PSYCHIATRY PROGRESS NOTE - NSBHASSESSSUMMFT_PSY_ALL_CORE
Catatonic features continue to improve, continues to have more mobility, less staring, and less withdrawn, affect more reactive, less rigid. Still requiring assistance with ADL's including toileting.     Ativan taper. For now continue Ativan, 1 mg four times per day.     Q15 checks with toileting schedule as per nursing staff.     PT consult placed (eval for need for CRISSY/other special needs for pt's ambulatory status/deconditioning).

## 2021-06-12 RX ADMIN — Medication 1 MILLIGRAM(S): at 12:33

## 2021-06-12 RX ADMIN — SERTRALINE 100 MILLIGRAM(S): 25 TABLET, FILM COATED ORAL at 08:29

## 2021-06-12 RX ADMIN — Medication 1 MILLIGRAM(S): at 21:20

## 2021-06-12 RX ADMIN — AMLODIPINE BESYLATE 5 MILLIGRAM(S): 2.5 TABLET ORAL at 08:29

## 2021-06-12 RX ADMIN — MEMANTINE HYDROCHLORIDE 10 MILLIGRAM(S): 10 TABLET ORAL at 21:20

## 2021-06-12 RX ADMIN — Medication 1 MILLIGRAM(S): at 08:29

## 2021-06-12 RX ADMIN — Medication 1 MILLIGRAM(S): at 17:03

## 2021-06-12 RX ADMIN — MEMANTINE HYDROCHLORIDE 10 MILLIGRAM(S): 10 TABLET ORAL at 08:29

## 2021-06-12 RX ADMIN — RISPERIDONE 4 MILLIGRAM(S): 4 TABLET ORAL at 08:29

## 2021-06-13 RX ADMIN — AMLODIPINE BESYLATE 5 MILLIGRAM(S): 2.5 TABLET ORAL at 08:08

## 2021-06-13 RX ADMIN — Medication 1 MILLIGRAM(S): at 16:21

## 2021-06-13 RX ADMIN — Medication 1 MILLIGRAM(S): at 08:08

## 2021-06-13 RX ADMIN — Medication 1 MILLIGRAM(S): at 12:50

## 2021-06-13 RX ADMIN — RISPERIDONE 4 MILLIGRAM(S): 4 TABLET ORAL at 08:08

## 2021-06-13 RX ADMIN — MEMANTINE HYDROCHLORIDE 10 MILLIGRAM(S): 10 TABLET ORAL at 21:05

## 2021-06-13 RX ADMIN — MEMANTINE HYDROCHLORIDE 10 MILLIGRAM(S): 10 TABLET ORAL at 08:08

## 2021-06-13 RX ADMIN — SERTRALINE 100 MILLIGRAM(S): 25 TABLET, FILM COATED ORAL at 08:08

## 2021-06-13 RX ADMIN — Medication 1 MILLIGRAM(S): at 21:05

## 2021-06-14 PROCEDURE — 99231 SBSQ HOSP IP/OBS SF/LOW 25: CPT

## 2021-06-14 RX ADMIN — Medication 1 MILLIGRAM(S): at 21:10

## 2021-06-14 RX ADMIN — Medication 1 MILLIGRAM(S): at 12:36

## 2021-06-14 RX ADMIN — AMLODIPINE BESYLATE 5 MILLIGRAM(S): 2.5 TABLET ORAL at 08:21

## 2021-06-14 RX ADMIN — MEMANTINE HYDROCHLORIDE 10 MILLIGRAM(S): 10 TABLET ORAL at 21:10

## 2021-06-14 RX ADMIN — Medication 1 MILLIGRAM(S): at 08:20

## 2021-06-14 RX ADMIN — RISPERIDONE 4 MILLIGRAM(S): 4 TABLET ORAL at 08:20

## 2021-06-14 RX ADMIN — SERTRALINE 100 MILLIGRAM(S): 25 TABLET, FILM COATED ORAL at 08:20

## 2021-06-14 RX ADMIN — Medication 0.5 MILLIGRAM(S): at 16:31

## 2021-06-14 RX ADMIN — MEMANTINE HYDROCHLORIDE 10 MILLIGRAM(S): 10 TABLET ORAL at 08:20

## 2021-06-14 NOTE — BH INPATIENT PSYCHIATRY PROGRESS NOTE - NSBHASSESSSUMMFT_PSY_ALL_CORE
Catatonic features continue to improve, continues to have more mobility, less staring, and less withdrawn, affect more reactive, less rigid. Still requiring assistance with ADL's including toileting. Seen by PT who is recommending discharge to [facility].     Ativan taper. Current 1 mg QID. Will lower 1700 dose to 0.5 mg today (6/14), and to be stopped tomorrow (6/15) with goal of reaching 1 mg TID.     Q15 checks with toileting schedule as per nursing staff.

## 2021-06-14 NOTE — BH INPATIENT PSYCHIATRY PROGRESS NOTE - NSBHFUPINTERVALHXFT_PSY_A_CORE
No acute events overnight. Is sitting in a tano-chair in the dayroom watching TV. Pleasant on approach. Patient reports that he is feeling good today, no current complaints. Patient updated on recommendation from PT about CRISSY. Expresses ambivalence, saying that he would like to think about it. Is concerned with not having enough clothes.

## 2021-06-14 NOTE — BH INPATIENT PSYCHIATRY PROGRESS NOTE - MSE UNSTRUCTURED FT
Patient is a 45 year old male, wearing casual clothes, fair hygiene and grooming, calm, cooperative, sitting on side of bed. Fair eye contact. PMR still present but continues improving. Speech is soft and slow with some latency, tone continues to be increasingly less monotonous, mildly increasing speech productivity. Affect less blunted and continue to be more reactive. TP with paucity of thought, superficially linear and goal directed. TC: Continues to have concerns with needing more clothes. No delusional content elicited. No SI/HI elicited, denies AH. Cognition grossly intact. Insight poor, judgement fair.

## 2021-06-14 NOTE — BH INPATIENT PSYCHIATRY PROGRESS NOTE - CASE SUMMARY
Continue reducing lorazepam as tolerated, target dose 1 mg tid.     Continue rest of meds as ordered.  Will follow up with PT re: needs post d/c. Pt continues to be visible on unit, but still requiring walker (not baseline) and assistance with bathroom ADLs (not baseline).    PT recommending CRISSY.  Will begin planning.

## 2021-06-15 PROCEDURE — 99231 SBSQ HOSP IP/OBS SF/LOW 25: CPT

## 2021-06-15 RX ADMIN — Medication 1 MILLIGRAM(S): at 12:55

## 2021-06-15 RX ADMIN — SERTRALINE 100 MILLIGRAM(S): 25 TABLET, FILM COATED ORAL at 08:12

## 2021-06-15 RX ADMIN — Medication 1 MILLIGRAM(S): at 08:11

## 2021-06-15 RX ADMIN — Medication 1 MILLIGRAM(S): at 20:11

## 2021-06-15 RX ADMIN — MEMANTINE HYDROCHLORIDE 10 MILLIGRAM(S): 10 TABLET ORAL at 08:12

## 2021-06-15 RX ADMIN — RISPERIDONE 4 MILLIGRAM(S): 4 TABLET ORAL at 08:12

## 2021-06-15 RX ADMIN — MEMANTINE HYDROCHLORIDE 10 MILLIGRAM(S): 10 TABLET ORAL at 20:11

## 2021-06-15 RX ADMIN — AMLODIPINE BESYLATE 5 MILLIGRAM(S): 2.5 TABLET ORAL at 08:11

## 2021-06-15 NOTE — BH INPATIENT PSYCHIATRY PROGRESS NOTE - NSBHASSESSSUMMFT_PSY_ALL_CORE
Pt admitted with catatonia, with suspected underlying etiology of worsening of historical diagnosis of schizophrenia.    Pt with improvement in catatonia and ADLs.  Likely at mental status baseline, however ADLs continue to require assistance from staff.    PT recommended subacute rehab, will begin dispo planning for this.    Continue meds as prescribed.

## 2021-06-15 NOTE — BH INPATIENT PSYCHIATRY PROGRESS NOTE - NSBHFUPINTERVALHXFT_PSY_A_CORE
No overnight events.  Pt found watching television.  Acknowledges plan is for subacute rehab.  States he has no needs at this time.

## 2021-06-15 NOTE — BH INPATIENT PSYCHIATRY PROGRESS NOTE - MSE UNSTRUCTURED FT
Dressed appropriately in casual wear.  Somewhat disheveled.  Speech laconic.  Mood is "good," affect pleasant.  [place] TP, limited associations.  TC: Poverty of thought content.  Insight limited, judgment fair, attention fair, language fluent, gait seated.

## 2021-06-16 PROCEDURE — 99232 SBSQ HOSP IP/OBS MODERATE 35: CPT

## 2021-06-16 RX ADMIN — MEMANTINE HYDROCHLORIDE 10 MILLIGRAM(S): 10 TABLET ORAL at 08:48

## 2021-06-16 RX ADMIN — Medication 1 MILLIGRAM(S): at 12:18

## 2021-06-16 RX ADMIN — RISPERIDONE 4 MILLIGRAM(S): 4 TABLET ORAL at 08:48

## 2021-06-16 RX ADMIN — AMLODIPINE BESYLATE 5 MILLIGRAM(S): 2.5 TABLET ORAL at 08:48

## 2021-06-16 RX ADMIN — Medication 1 MILLIGRAM(S): at 08:48

## 2021-06-16 RX ADMIN — Medication 1 MILLIGRAM(S): at 20:04

## 2021-06-16 RX ADMIN — SERTRALINE 100 MILLIGRAM(S): 25 TABLET, FILM COATED ORAL at 08:48

## 2021-06-16 RX ADMIN — MEMANTINE HYDROCHLORIDE 10 MILLIGRAM(S): 10 TABLET ORAL at 20:04

## 2021-06-16 NOTE — BH INPATIENT PSYCHIATRY PROGRESS NOTE - MSE UNSTRUCTURED FT
Patient is a 45 year old male, wearing casual clothes, fair hygiene and grooming, calm, cooperative, sitting in vivek in dayroom. Fair eye contact. PMR continues to attenuate. Speech is soft and slow with some latency, tone continues to be increasingly less monotonous, mildly increasing speech productivity. Affect less blunted and continue to be more reactive smiles at interviewer on approach. TP with superficially linear and goal directed, . TC: focused on wanting discharge soon. No delusional content elicited. No SI/HI elicited, denies AH. Cognition grossly intact. Insight poor, judgement fair.

## 2021-06-16 NOTE — BH INPATIENT PSYCHIATRY PROGRESS NOTE - NSBHFUPINTERVALHXFT_PSY_A_CORE
No acute events overnight. Is sitting in the dayroom watching TV. Friendly and smiles on approach. No current complaints. Expresses understanding of plan for CRISSY, and amenable to this plan.

## 2021-06-16 NOTE — BH INPATIENT PSYCHIATRY PROGRESS NOTE - NSBHASSESSSUMMFT_PSY_ALL_CORE
Catatonic features continue to improve, continues to have more mobility, less staring, and less withdrawn, affect more reactive, less rigid. Expresses understanding of and is amenable to discharge to [facility].      Ativan taper, now 1 mg TID, which he is tolerating well without rebound catatonia.

## 2021-06-17 PROCEDURE — 99232 SBSQ HOSP IP/OBS MODERATE 35: CPT

## 2021-06-17 RX ADMIN — Medication 1 MILLIGRAM(S): at 08:23

## 2021-06-17 RX ADMIN — RISPERIDONE 4 MILLIGRAM(S): 4 TABLET ORAL at 08:23

## 2021-06-17 RX ADMIN — Medication 1 MILLIGRAM(S): at 20:10

## 2021-06-17 RX ADMIN — Medication 1 MILLIGRAM(S): at 13:03

## 2021-06-17 RX ADMIN — MEMANTINE HYDROCHLORIDE 10 MILLIGRAM(S): 10 TABLET ORAL at 20:10

## 2021-06-17 RX ADMIN — MEMANTINE HYDROCHLORIDE 10 MILLIGRAM(S): 10 TABLET ORAL at 08:23

## 2021-06-17 RX ADMIN — SERTRALINE 100 MILLIGRAM(S): 25 TABLET, FILM COATED ORAL at 08:23

## 2021-06-17 RX ADMIN — AMLODIPINE BESYLATE 5 MILLIGRAM(S): 2.5 TABLET ORAL at 08:23

## 2021-06-17 NOTE — BH INPATIENT PSYCHIATRY PROGRESS NOTE - NSBHFUPINTERVALHXFT_PSY_A_CORE
No acute events overnight. Approached laying in bed, sits up and turns to sit over side of bed for interview. No current complaints. Reports he is eating and sleeping well, and looking forward to leaving the hospital when he can. Continues to be amenable to plan for CRISSY.

## 2021-06-17 NOTE — BH INPATIENT PSYCHIATRY PROGRESS NOTE - MSE UNSTRUCTURED FT
Patient is a 45 year old male, wearing casual clothes, fair hygiene and grooming, calm, cooperative, sitting over side of bed in room. Fair eye contact. PMR continues to attenuate. Speech is soft and slow with less latency, tone continues to be increasingly less monotonous, low productivity . Affect less blunted and continues to be more reactive, again smiles at interviewer on approach. TP with superficially linear and goal directed. TC: Some paucity of thought, theme of looking forward to discharge. No delusional content elicited. No SI/HI elicited, denies AH. Cognition grossly intact. Insight poor, judgement fair.

## 2021-06-17 NOTE — BH INPATIENT PSYCHIATRY PROGRESS NOTE - NSBHASSESSSUMMFT_PSY_ALL_CORE
Eating and sleeping well, ADL's improving. Catatonic features continue to improve, more mobility, less staring, and less withdrawn, affect more reactive, less rigid. Expresses understanding of and is amenable to discharge to [facility].    Continue Ativan 1 mg TID.

## 2021-06-18 PROCEDURE — 99231 SBSQ HOSP IP/OBS SF/LOW 25: CPT

## 2021-06-18 RX ADMIN — Medication 1 MILLIGRAM(S): at 12:18

## 2021-06-18 RX ADMIN — AMLODIPINE BESYLATE 5 MILLIGRAM(S): 2.5 TABLET ORAL at 08:08

## 2021-06-18 RX ADMIN — RISPERIDONE 4 MILLIGRAM(S): 4 TABLET ORAL at 08:08

## 2021-06-18 RX ADMIN — SERTRALINE 100 MILLIGRAM(S): 25 TABLET, FILM COATED ORAL at 08:08

## 2021-06-18 RX ADMIN — MEMANTINE HYDROCHLORIDE 10 MILLIGRAM(S): 10 TABLET ORAL at 08:08

## 2021-06-18 RX ADMIN — Medication 1 MILLIGRAM(S): at 21:21

## 2021-06-18 RX ADMIN — MEMANTINE HYDROCHLORIDE 10 MILLIGRAM(S): 10 TABLET ORAL at 21:20

## 2021-06-18 RX ADMIN — Medication 1 MILLIGRAM(S): at 08:08

## 2021-06-18 NOTE — BH INPATIENT PSYCHIATRY PROGRESS NOTE - NSBHFUPINTERVALHXFT_PSY_A_CORE
No acute events overnight. Approached sitting in dayroom. Has been attending groups.  Reports good mood, no current complaints.

## 2021-06-18 NOTE — BH INPATIENT PSYCHIATRY PROGRESS NOTE - MSE UNSTRUCTURED FT
Patient is a 45 year old male, wearing casual clothes, fair hygiene and grooming, calm, cooperative, sitting in day room. Fair eye contact. PMR continues to attenuate. Speech is soft and slow with less latency, tone continues to be increasingly less monotonous, low productivity . Affect less blunted and continues to be more reactive, again smiles at interviewer on approach. TP with superficially linear and goal directed. TC: Some paucity of thought, theme of looking forward to discharge. No delusional content elicited. No SI/HI elicited, denies AH. Cognition grossly intact. Insight poor, judgement fair.

## 2021-06-18 NOTE — BH INPATIENT PSYCHIATRY PROGRESS NOTE - NSBHASSESSSUMMFT_PSY_ALL_CORE
Continued improvement. Eating and sleeping well, ADL's improving. Catatonic features continue to improve, more mobility, less staring, and less withdrawn, affect more reactive, less rigid. Now awaiting discharge to [facility]     Continue Ativan 1 mg TID.

## 2021-06-19 RX ADMIN — RISPERIDONE 4 MILLIGRAM(S): 4 TABLET ORAL at 08:52

## 2021-06-19 RX ADMIN — MEMANTINE HYDROCHLORIDE 10 MILLIGRAM(S): 10 TABLET ORAL at 08:53

## 2021-06-19 RX ADMIN — SERTRALINE 100 MILLIGRAM(S): 25 TABLET, FILM COATED ORAL at 08:53

## 2021-06-19 RX ADMIN — AMLODIPINE BESYLATE 5 MILLIGRAM(S): 2.5 TABLET ORAL at 08:53

## 2021-06-19 RX ADMIN — Medication 1 MILLIGRAM(S): at 20:57

## 2021-06-19 RX ADMIN — Medication 1 MILLIGRAM(S): at 08:53

## 2021-06-19 RX ADMIN — MEMANTINE HYDROCHLORIDE 10 MILLIGRAM(S): 10 TABLET ORAL at 20:57

## 2021-06-19 RX ADMIN — Medication 1 MILLIGRAM(S): at 13:00

## 2021-06-20 RX ADMIN — Medication 1 MILLIGRAM(S): at 13:02

## 2021-06-20 RX ADMIN — MEMANTINE HYDROCHLORIDE 10 MILLIGRAM(S): 10 TABLET ORAL at 08:39

## 2021-06-20 RX ADMIN — MEMANTINE HYDROCHLORIDE 10 MILLIGRAM(S): 10 TABLET ORAL at 21:25

## 2021-06-20 RX ADMIN — SERTRALINE 100 MILLIGRAM(S): 25 TABLET, FILM COATED ORAL at 08:39

## 2021-06-20 RX ADMIN — Medication 1 MILLIGRAM(S): at 21:25

## 2021-06-20 RX ADMIN — AMLODIPINE BESYLATE 5 MILLIGRAM(S): 2.5 TABLET ORAL at 08:40

## 2021-06-20 RX ADMIN — RISPERIDONE 4 MILLIGRAM(S): 4 TABLET ORAL at 08:39

## 2021-06-20 RX ADMIN — Medication 1 MILLIGRAM(S): at 08:39

## 2021-06-21 PROCEDURE — 99232 SBSQ HOSP IP/OBS MODERATE 35: CPT

## 2021-06-21 RX ADMIN — MEMANTINE HYDROCHLORIDE 10 MILLIGRAM(S): 10 TABLET ORAL at 09:13

## 2021-06-21 RX ADMIN — Medication 1 MILLIGRAM(S): at 09:15

## 2021-06-21 RX ADMIN — RISPERIDONE 4 MILLIGRAM(S): 4 TABLET ORAL at 09:13

## 2021-06-21 RX ADMIN — AMLODIPINE BESYLATE 5 MILLIGRAM(S): 2.5 TABLET ORAL at 09:13

## 2021-06-21 RX ADMIN — Medication 1 MILLIGRAM(S): at 13:14

## 2021-06-21 RX ADMIN — MEMANTINE HYDROCHLORIDE 10 MILLIGRAM(S): 10 TABLET ORAL at 20:57

## 2021-06-21 RX ADMIN — Medication 1 MILLIGRAM(S): at 20:57

## 2021-06-21 RX ADMIN — SERTRALINE 100 MILLIGRAM(S): 25 TABLET, FILM COATED ORAL at 09:14

## 2021-06-21 NOTE — BH INPATIENT PSYCHIATRY PROGRESS NOTE - CASE SUMMARY
The patient continues to maintain improvements in symptoms, more verbal, pleasant on approach.  He denies any problems.  He has been compliant with medications, tolerating them well.  Will continue.

## 2021-06-21 NOTE — BH INPATIENT PSYCHIATRY PROGRESS NOTE - NSBHASSESSSUMMFT_PSY_ALL_CORE
Patient continues to improve. Eating and sleeping well, ADL's improving. Catatonic features continue to improve, with continued modest increase in mobility, no longer staring, and continually less less withdrawn, affect more reactive, muscles less rigid. Still pending discharge to [facility].     Continue Ativan 1 mg TID.

## 2021-06-21 NOTE — BH INPATIENT PSYCHIATRY PROGRESS NOTE - MSE UNSTRUCTURED FT
Patient is a 45 year old male, wearing casual clothes, fair hygiene and grooming, calm, cooperative, sitting in day room. Fair eye contact. PMR continues to attenuate. Speech is soft, no normal rate, with less latency, tone continues to be increasingly less monotonous, still with low productivity but increasingly more verbal. Affect continues to be less blunted and continues to be more reactive. TP with superficially linear and goal directed. TC: Some paucity of thought, themes of enjoying groups. No delusional content elicited. No SI/HI elicited, denies AH. Cognition grossly intact. Insight poor, judgement fair.

## 2021-06-21 NOTE — BH INPATIENT PSYCHIATRY PROGRESS NOTE - NSBHFUPINTERVALHXFT_PSY_A_CORE
No acute events overnight or over weekend. Approached sitting in dayroom. Patient smiles on approach. Reports that he has been enjoying attending groups. Has been in touch on phone with his mom and receiving updates about his father's health. Reports good mood, no current complaints.

## 2021-06-22 PROCEDURE — 99231 SBSQ HOSP IP/OBS SF/LOW 25: CPT

## 2021-06-22 RX ADMIN — Medication 1 MILLIGRAM(S): at 12:10

## 2021-06-22 RX ADMIN — MEMANTINE HYDROCHLORIDE 10 MILLIGRAM(S): 10 TABLET ORAL at 21:12

## 2021-06-22 RX ADMIN — SERTRALINE 100 MILLIGRAM(S): 25 TABLET, FILM COATED ORAL at 08:10

## 2021-06-22 RX ADMIN — AMLODIPINE BESYLATE 5 MILLIGRAM(S): 2.5 TABLET ORAL at 08:10

## 2021-06-22 RX ADMIN — RISPERIDONE 4 MILLIGRAM(S): 4 TABLET ORAL at 08:10

## 2021-06-22 RX ADMIN — MEMANTINE HYDROCHLORIDE 10 MILLIGRAM(S): 10 TABLET ORAL at 08:10

## 2021-06-22 RX ADMIN — Medication 1 MILLIGRAM(S): at 08:10

## 2021-06-22 RX ADMIN — Medication 1 MILLIGRAM(S): at 21:12

## 2021-06-22 NOTE — BH INPATIENT PSYCHIATRY PROGRESS NOTE - CASE SUMMARY
The patient continues to maintain improvements in symptoms, more verbal, pleasant on approach.  He denies any problems.  He has been compliant with medications, tolerating them well.  Will continue. 45M w/schizophrenia, previously admitted with psychosis and catatonia.  Catatonia now largely resolved.  Still needs assistance with ADLs.  PT recommending CRISSY.  Continue risperidone, sertraline, lorazepam for now.  CRISSY application submitted.

## 2021-06-22 NOTE — BH INPATIENT PSYCHIATRY PROGRESS NOTE - NSBHFUPINTERVALHXFT_PSY_A_CORE
No acute events overnight. Approached sitting in dayroom. Patient smiles on approach. Reports that he has been enjoying attending groups, wtching TV, and working on his walking. Reports good mood, no current complaints.

## 2021-06-22 NOTE — BH INPATIENT PSYCHIATRY PROGRESS NOTE - NSBHASSESSSUMMFT_PSY_ALL_CORE
Eating and sleeping well, ADL's improving. Catatonic features continue to improve, with continued modest increase in mobility, no longer staring, and continually less less withdrawn, affect more reactive, muscles less rigid. Still pending discharge to [facility]. Overall continued improvement    Continue Ativan 1 mg TID.

## 2021-06-22 NOTE — BH INPATIENT PSYCHIATRY PROGRESS NOTE - MODIFICATIONS
Patient seen by me, chart reviewed, and case discussed with treatment team.  Reviewed and agree with above progress note, assessment and plan; corrections made where appropriate. Modifications were made to above trainee note where appropriate and/or are addressed below in case summary.

## 2021-06-22 NOTE — BH INPATIENT PSYCHIATRY PROGRESS NOTE - MSE UNSTRUCTURED FT
Patient is a 45 year old male, wearing casual clothes, fair hygiene and grooming, calm, cooperative, sitting in day room. Fair eye contact. PMR continues to attenuate. Speech is soft, no normal rate, with less latency, tone continues to be increasingly less monotonous, still with low productivity, still increasingly more verbal. Affect continues to be less blunted and continues to be more reactive. TP with superficially linear and goal directed. TC: Some paucity of thought. No delusional content elicited. No SI/HI elicited, denies AH. Cognition grossly intact. Insight poor, judgement fair.

## 2021-06-23 PROCEDURE — 99231 SBSQ HOSP IP/OBS SF/LOW 25: CPT

## 2021-06-23 RX ADMIN — RISPERIDONE 4 MILLIGRAM(S): 4 TABLET ORAL at 08:15

## 2021-06-23 RX ADMIN — Medication 1 MILLIGRAM(S): at 08:15

## 2021-06-23 RX ADMIN — Medication 1 MILLIGRAM(S): at 21:25

## 2021-06-23 RX ADMIN — MEMANTINE HYDROCHLORIDE 10 MILLIGRAM(S): 10 TABLET ORAL at 08:15

## 2021-06-23 RX ADMIN — Medication 1 MILLIGRAM(S): at 12:27

## 2021-06-23 RX ADMIN — MEMANTINE HYDROCHLORIDE 10 MILLIGRAM(S): 10 TABLET ORAL at 21:25

## 2021-06-23 RX ADMIN — SERTRALINE 100 MILLIGRAM(S): 25 TABLET, FILM COATED ORAL at 08:15

## 2021-06-23 RX ADMIN — AMLODIPINE BESYLATE 5 MILLIGRAM(S): 2.5 TABLET ORAL at 08:15

## 2021-06-23 NOTE — BH INPATIENT PSYCHIATRY PROGRESS NOTE - CASE SUMMARY
45M w/schizophrenia, previously admitted with psychosis and catatonia.  Catatonia now largely resolved.  Still needs assistance with ADLs.  PT recommending CRISSY.  Continue risperidone, sertraline, lorazepam for now.  CRISSY application submitted. As per staff report, pt requires much less assistance with ADLs.  Requires some prompting but is physically able to do ADLs better.      Otherwise continues to have positive affect and communicating more.      Dispo planning.

## 2021-06-23 NOTE — BH INPATIENT PSYCHIATRY PROGRESS NOTE - MSE UNSTRUCTURED FT
Patient is a 45 year old male, wearing casual clothes, fair hygiene and grooming, calm, cooperative, sitting in day room watching TV. Fair eye contact. Mild PMR. Speech is soft, no normal rate, with less latency, tone continues to be increasingly less monotonous, still with low productivity, still increasingly more verbal. Affect continues to be less blunted and continues to be more reactive. TP with superficially linear and goal directed. TC: Some paucity of thought. No delusional content elicited. No SI/HI elicited, denies AH. Cognition grossly intact. Insight poor, judgement fair.

## 2021-06-23 NOTE — BH INPATIENT PSYCHIATRY PROGRESS NOTE - NSBHFUPINTERVALHXFT_PSY_A_CORE
No acute events overnight. Active on unit, watching TV and reading. Continues to reports that he has been enjoying attending groups, watching TV, and working on his walking. Reports good mood, no current complaints.

## 2021-06-23 NOTE — BH INPATIENT PSYCHIATRY PROGRESS NOTE - NSBHASSESSSUMMFT_PSY_ALL_CORE
Overall continued improvement. Eating and sleeping well, ADL's continue to slowly improve. Still pending discharge to [facility].     Continue Ativan 1 mg TID.

## 2021-06-24 PROCEDURE — 99231 SBSQ HOSP IP/OBS SF/LOW 25: CPT

## 2021-06-24 RX ADMIN — SERTRALINE 100 MILLIGRAM(S): 25 TABLET, FILM COATED ORAL at 09:37

## 2021-06-24 RX ADMIN — Medication 1 MILLIGRAM(S): at 13:10

## 2021-06-24 RX ADMIN — Medication 1 MILLIGRAM(S): at 21:10

## 2021-06-24 RX ADMIN — Medication 1 MILLIGRAM(S): at 09:36

## 2021-06-24 RX ADMIN — AMLODIPINE BESYLATE 5 MILLIGRAM(S): 2.5 TABLET ORAL at 09:36

## 2021-06-24 RX ADMIN — RISPERIDONE 4 MILLIGRAM(S): 4 TABLET ORAL at 09:37

## 2021-06-24 RX ADMIN — MEMANTINE HYDROCHLORIDE 10 MILLIGRAM(S): 10 TABLET ORAL at 09:36

## 2021-06-24 RX ADMIN — MEMANTINE HYDROCHLORIDE 10 MILLIGRAM(S): 10 TABLET ORAL at 21:10

## 2021-06-24 NOTE — BH INPATIENT PSYCHIATRY PROGRESS NOTE - NSBHASSESSSUMMFT_PSY_ALL_CORE
Catatonia resolved.  Continues to improve in ADLs.  Continue meds.  Tentative dispo at this time: [facility]

## 2021-06-24 NOTE — BH INPATIENT PSYCHIATRY PROGRESS NOTE - NSBHFUPINTERVALHXFT_PSY_A_CORE
No overnight events.  Pt this AM states his moods are okay, continues to spend time watching television on unit.      Later in AM family contacted unit to report that pt's father passed away.  Family teleconference held with pt.

## 2021-06-24 NOTE — BH INPATIENT PSYCHIATRY PROGRESS NOTE - MSE UNSTRUCTURED FT
Dressed appropriately, in day room, calm, cooperative.  Speech laconic.  Mood is "good," affect pleasant.  [place] TP, limited associations.  TC: Poverty of TC, but denies AVH, SIIP, HIIP.  Insight limited, judgment improved, attention fair, language fluent, gait/station: seated.

## 2021-06-25 PROCEDURE — 99231 SBSQ HOSP IP/OBS SF/LOW 25: CPT

## 2021-06-25 RX ADMIN — SERTRALINE 100 MILLIGRAM(S): 25 TABLET, FILM COATED ORAL at 08:43

## 2021-06-25 RX ADMIN — Medication 1 MILLIGRAM(S): at 20:25

## 2021-06-25 RX ADMIN — AMLODIPINE BESYLATE 5 MILLIGRAM(S): 2.5 TABLET ORAL at 08:42

## 2021-06-25 RX ADMIN — MEMANTINE HYDROCHLORIDE 10 MILLIGRAM(S): 10 TABLET ORAL at 08:42

## 2021-06-25 RX ADMIN — MEMANTINE HYDROCHLORIDE 10 MILLIGRAM(S): 10 TABLET ORAL at 20:25

## 2021-06-25 RX ADMIN — Medication 1 MILLIGRAM(S): at 08:42

## 2021-06-25 RX ADMIN — RISPERIDONE 4 MILLIGRAM(S): 4 TABLET ORAL at 08:42

## 2021-06-25 RX ADMIN — Medication 1 MILLIGRAM(S): at 13:16

## 2021-06-25 NOTE — BH INPATIENT PSYCHIATRY PROGRESS NOTE - NSBHFUPINTERVALHXFT_PSY_A_CORE
Pt states he is feeling ok, states that he is coping with loss of father.  Otherwise no complaints or needs this AM.

## 2021-06-26 RX ADMIN — Medication 1 MILLIGRAM(S): at 12:52

## 2021-06-26 RX ADMIN — AMLODIPINE BESYLATE 5 MILLIGRAM(S): 2.5 TABLET ORAL at 08:55

## 2021-06-26 RX ADMIN — Medication 1 MILLIGRAM(S): at 20:15

## 2021-06-26 RX ADMIN — RISPERIDONE 4 MILLIGRAM(S): 4 TABLET ORAL at 08:55

## 2021-06-26 RX ADMIN — MEMANTINE HYDROCHLORIDE 10 MILLIGRAM(S): 10 TABLET ORAL at 08:55

## 2021-06-26 RX ADMIN — Medication 1 MILLIGRAM(S): at 08:55

## 2021-06-26 RX ADMIN — SERTRALINE 100 MILLIGRAM(S): 25 TABLET, FILM COATED ORAL at 08:54

## 2021-06-26 RX ADMIN — MEMANTINE HYDROCHLORIDE 10 MILLIGRAM(S): 10 TABLET ORAL at 20:15

## 2021-06-27 RX ADMIN — Medication 1 MILLIGRAM(S): at 08:23

## 2021-06-27 RX ADMIN — AMLODIPINE BESYLATE 5 MILLIGRAM(S): 2.5 TABLET ORAL at 08:23

## 2021-06-27 RX ADMIN — Medication 1 MILLIGRAM(S): at 20:34

## 2021-06-27 RX ADMIN — Medication 1 MILLIGRAM(S): at 12:36

## 2021-06-27 RX ADMIN — MEMANTINE HYDROCHLORIDE 10 MILLIGRAM(S): 10 TABLET ORAL at 08:23

## 2021-06-27 RX ADMIN — RISPERIDONE 4 MILLIGRAM(S): 4 TABLET ORAL at 08:23

## 2021-06-27 RX ADMIN — SERTRALINE 100 MILLIGRAM(S): 25 TABLET, FILM COATED ORAL at 08:23

## 2021-06-27 RX ADMIN — MEMANTINE HYDROCHLORIDE 10 MILLIGRAM(S): 10 TABLET ORAL at 20:34

## 2021-06-28 PROCEDURE — 99231 SBSQ HOSP IP/OBS SF/LOW 25: CPT

## 2021-06-28 RX ADMIN — MEMANTINE HYDROCHLORIDE 10 MILLIGRAM(S): 10 TABLET ORAL at 08:59

## 2021-06-28 RX ADMIN — AMLODIPINE BESYLATE 5 MILLIGRAM(S): 2.5 TABLET ORAL at 08:59

## 2021-06-28 RX ADMIN — MEMANTINE HYDROCHLORIDE 10 MILLIGRAM(S): 10 TABLET ORAL at 20:23

## 2021-06-28 RX ADMIN — SERTRALINE 100 MILLIGRAM(S): 25 TABLET, FILM COATED ORAL at 08:59

## 2021-06-28 RX ADMIN — RISPERIDONE 4 MILLIGRAM(S): 4 TABLET ORAL at 08:59

## 2021-06-28 RX ADMIN — Medication 0.5 MILLIGRAM(S): at 12:29

## 2021-06-28 RX ADMIN — Medication 1 MILLIGRAM(S): at 08:59

## 2021-06-28 RX ADMIN — Medication 1 MILLIGRAM(S): at 20:23

## 2021-06-28 NOTE — BH INPATIENT PSYCHIATRY PROGRESS NOTE - CASE SUMMARY
Pt at this time continues to be pleasant and visible on unit, without any overt psychosis or catatonia.  Will begin reducing lorazepam further, slowly, to minimize any falls risk as he continues with PT.  Monitor for rebound catatonia.  Pt will require CRISSY for now though will revisit with PT this week to confirm ongoing need.

## 2021-06-28 NOTE — BH INPATIENT PSYCHIATRY PROGRESS NOTE - MSE UNSTRUCTURED FT
Dressed in hospital gown, in day room, calm, cooperative.  Speech laconic.  Mood is "good," affect pleasant, smiling.  [place] TP, limited associations.  TC: Still with poverty of TC, but denies AVH, SIIP, HIIP.  Insight limited, judgment improved, attention fair, language fluent, gait/station: seated.

## 2021-06-28 NOTE — BH INPATIENT PSYCHIATRY PROGRESS NOTE - NSBHASSESSSUMMFT_PSY_ALL_CORE
Catatonia resolved, ADLs improving.  CRISSY application submitted, continue to work with PT.  Will attempt further slow taper Ativan. Now 1 mg in morning and evening, 0.5 mg in afternoon (2.5 mg total).

## 2021-06-28 NOTE — BH INPATIENT PSYCHIATRY PROGRESS NOTE - NSBHFUPINTERVALHXFT_PSY_A_CORE
Found in day room watching [place]ankee game. Has been active on unit. Pt states he is feeling ok and gaining strength back  Otherwise no complaints or needs this AM.

## 2021-06-29 PROCEDURE — 99231 SBSQ HOSP IP/OBS SF/LOW 25: CPT

## 2021-06-29 RX ADMIN — MEMANTINE HYDROCHLORIDE 10 MILLIGRAM(S): 10 TABLET ORAL at 20:55

## 2021-06-29 RX ADMIN — Medication 0.5 MILLIGRAM(S): at 13:24

## 2021-06-29 RX ADMIN — AMLODIPINE BESYLATE 5 MILLIGRAM(S): 2.5 TABLET ORAL at 08:37

## 2021-06-29 RX ADMIN — RISPERIDONE 4 MILLIGRAM(S): 4 TABLET ORAL at 08:38

## 2021-06-29 RX ADMIN — SERTRALINE 100 MILLIGRAM(S): 25 TABLET, FILM COATED ORAL at 08:37

## 2021-06-29 RX ADMIN — MEMANTINE HYDROCHLORIDE 10 MILLIGRAM(S): 10 TABLET ORAL at 08:37

## 2021-06-29 RX ADMIN — Medication 1 MILLIGRAM(S): at 20:54

## 2021-06-29 RX ADMIN — Medication 1 MILLIGRAM(S): at 08:38

## 2021-06-29 NOTE — BH INPATIENT PSYCHIATRY PROGRESS NOTE - NSBHFUPINTERVALHXFT_PSY_A_CORE
Found in day room watching TV. Remains active on unit. Pt states he is feeling ok, has been in touch with family, coping with loss of his father. Otherwise no complaints or needs this AM.

## 2021-06-29 NOTE — BH INPATIENT PSYCHIATRY PROGRESS NOTE - MSE UNSTRUCTURED FT
Dressed in hospital gown, in day room, calm, cooperative.  Speech laconic.  Mood is "okay," affect pleasant, smiling.  [place] TP, limited associations.  TC: Still with poverty of TC, but denies AVH, SIIP, HIIP.  Insight limited, judgment improved, attention fair, language fluent, gait/station: seated.

## 2021-06-29 NOTE — BH INPATIENT PSYCHIATRY PROGRESS NOTE - CASE SUMMARY
Pt at this time continues to be pleasant and visible on unit, without any overt psychosis or catatonia.  Will begin reducing lorazepam further, slowly, to minimize any falls risk as he continues with PT.  Monitor for rebound catatonia.  Pt will require CRISSY for now though will revisit with PT this week to confirm ongoing need. Pt continues to be compliant with meds and basic care.  Pleasant.  No active psychosis.    Awaiting CRISSY.

## 2021-06-29 NOTE — BH INPATIENT PSYCHIATRY PROGRESS NOTE - NSBHASSESSSUMMFT_PSY_ALL_CORE
Catatonia resolved, ADLs improving.  CRISSY application submitted, continue to work with PT.  Will attempt further slow taper Ativan. Now 1 mg in morning and evening, 0.5 mg in afternoon (2.5 mg total), tolerating well.

## 2021-06-30 PROCEDURE — 99231 SBSQ HOSP IP/OBS SF/LOW 25: CPT

## 2021-06-30 RX ADMIN — Medication 0.5 MILLIGRAM(S): at 13:26

## 2021-06-30 RX ADMIN — MEMANTINE HYDROCHLORIDE 10 MILLIGRAM(S): 10 TABLET ORAL at 20:26

## 2021-06-30 RX ADMIN — AMLODIPINE BESYLATE 5 MILLIGRAM(S): 2.5 TABLET ORAL at 08:38

## 2021-06-30 RX ADMIN — RISPERIDONE 4 MILLIGRAM(S): 4 TABLET ORAL at 08:38

## 2021-06-30 RX ADMIN — Medication 1 MILLIGRAM(S): at 20:25

## 2021-06-30 RX ADMIN — SERTRALINE 100 MILLIGRAM(S): 25 TABLET, FILM COATED ORAL at 08:37

## 2021-06-30 RX ADMIN — Medication 1 MILLIGRAM(S): at 08:37

## 2021-06-30 RX ADMIN — MEMANTINE HYDROCHLORIDE 10 MILLIGRAM(S): 10 TABLET ORAL at 08:37

## 2021-06-30 NOTE — BH INPATIENT PSYCHIATRY PROGRESS NOTE - NSBHFUPINTERVALHXFT_PSY_A_CORE
Found in day room watching sports on TV. Remains active on unit. Pt states he is feeling good with good appetite and sleep. He has been in touch with mother and siblings. Able to verbalize plan for physical rehab. Seen smiling during interview. Otherwise no complaints or needs this AM.

## 2021-06-30 NOTE — BH INPATIENT PSYCHIATRY PROGRESS NOTE - CASE SUMMARY
Pt continues to be compliant with meds and basic care.  Pleasant.  No active psychosis.    Awaiting CRISSY. Pt today seen ambulating with walker, understands dispo currently is CRISSY but PT will continue to work with him while awaiting placement.    No active psychosis.  Pt remains visible on unit.      Continue meds as ordered.  Can consider dc of afternoon ativan.

## 2021-06-30 NOTE — BH INPATIENT PSYCHIATRY PROGRESS NOTE - MSE UNSTRUCTURED FT
Dressed in hospital gown, in day room, calm, cooperative.  Speech normal rate and tone.  Mood is "good," affect pleasant, smiling.  [place] TP, mentioned plan for physical rehab.  TC: Still with poverty of TC. Denies AVH, SIIP, HIIP.  Insight limited, judgment improved, attention good, language fluent, gait/station: seated, still using walker.

## 2021-06-30 NOTE — BH INPATIENT PSYCHIATRY PROGRESS NOTE - NSBHASSESSSUMMFT_PSY_ALL_CORE
Catatonia resolved with no psychosis and improved mood, ADLs improving.  [facility] application submitted, continue to work with PT.  Continue slow Ativan taper. On 6/29/21, lowered to 1 mg in morning and evening, 0.5 mg in afternoon (2.5 mg total).

## 2021-07-01 PROCEDURE — 99231 SBSQ HOSP IP/OBS SF/LOW 25: CPT

## 2021-07-01 RX ADMIN — Medication 1 MILLIGRAM(S): at 08:10

## 2021-07-01 RX ADMIN — RISPERIDONE 4 MILLIGRAM(S): 4 TABLET ORAL at 08:09

## 2021-07-01 RX ADMIN — MEMANTINE HYDROCHLORIDE 10 MILLIGRAM(S): 10 TABLET ORAL at 08:09

## 2021-07-01 RX ADMIN — Medication 1 MILLIGRAM(S): at 20:14

## 2021-07-01 RX ADMIN — AMLODIPINE BESYLATE 5 MILLIGRAM(S): 2.5 TABLET ORAL at 08:09

## 2021-07-01 RX ADMIN — SERTRALINE 100 MILLIGRAM(S): 25 TABLET, FILM COATED ORAL at 08:09

## 2021-07-01 RX ADMIN — MEMANTINE HYDROCHLORIDE 10 MILLIGRAM(S): 10 TABLET ORAL at 20:14

## 2021-07-01 NOTE — BH INPATIENT PSYCHIATRY PROGRESS NOTE - NSBHASSESSSUMMFT_PSY_ALL_CORE
Catatonia resolved with no psychosis. Pt continues to have improved mood, ADLs improving. CRISSY application submitted, continue to work with PT.  Continue slow Ativan taper. to avoid remission. On 6/29/21, lowered to 1 mg in morning and evening, 0.5 mg in afternoon (2.5 mg total).     Catatonia resolved with no psychosis. Pt continues to have improved mood, ADLs improving. CRISSY application submitted, continue to work with PT.  Continue slow Ativan taper to avoid relapse. On 6/29/21, lowered to 1 mg in morning and evening, 0.5 mg in afternoon (2.5 mg total).

## 2021-07-01 NOTE — BH INPATIENT PSYCHIATRY PROGRESS NOTE - MSE UNSTRUCTURED FT
Dressed in hospital gown, seen sitting down in day room watching tennies, calm, cooperative and pleasant.  Speech normal rate and tone.  Mood is "good," affect pleasant, smiling.  [place] TP, able to verbalize what is going on with his mother.  TC: Still with poverty of TC. Denies AVH, SIIP, HIIP.  Insight limited, judgment fair, attention good, language fluent, gait/station: seated, still using walker.

## 2021-07-01 NOTE — BH INPATIENT PSYCHIATRY PROGRESS NOTE - CASE SUMMARY
Pt today seen ambulating with walker, understands dispo currently is CRISSY but PT will continue to work with him while awaiting placement.    No active psychosis.  Pt remains visible on unit.      Continue meds as ordered.  Can consider dc of afternoon ativan. Pt to continue working with PT to wean from walker, dispo at this time remains CRISSY.    No active positive symptoms of psychosis at this time.  Tapering lorazepam to 1 mg bid.  Continue rest of regimen.

## 2021-07-01 NOTE — BH INPATIENT PSYCHIATRY PROGRESS NOTE - NSBHFUPINTERVALHXFT_PSY_A_CORE
Found in day room watching sports on TV. Remains active on unit. Pt states he is feeling good with good appetite and sleep. He has been in touch with mother and she is currently in the ED for knee pain, pt needs initiation to provide details.  He states he feels stronger and has been ambulating well with walker. Otherwise no complaints or needs this AM. No overnight events. Found in day room watching sports on TV. Remains active on unit. Pt states he is feeling good with good appetite and sleep. He has been in touch with mother and she is currently in the ED for knee pain, pt needs initiation to provide details.  He states he feels stronger and has been ambulating well with walker. Otherwise no complaints or needs this AM.

## 2021-07-02 PROCEDURE — 99231 SBSQ HOSP IP/OBS SF/LOW 25: CPT

## 2021-07-02 RX ADMIN — MEMANTINE HYDROCHLORIDE 10 MILLIGRAM(S): 10 TABLET ORAL at 08:21

## 2021-07-02 RX ADMIN — AMLODIPINE BESYLATE 5 MILLIGRAM(S): 2.5 TABLET ORAL at 08:21

## 2021-07-02 RX ADMIN — Medication 1 MILLIGRAM(S): at 20:23

## 2021-07-02 RX ADMIN — RISPERIDONE 4 MILLIGRAM(S): 4 TABLET ORAL at 08:21

## 2021-07-02 RX ADMIN — MEMANTINE HYDROCHLORIDE 10 MILLIGRAM(S): 10 TABLET ORAL at 20:23

## 2021-07-02 RX ADMIN — SERTRALINE 100 MILLIGRAM(S): 25 TABLET, FILM COATED ORAL at 08:21

## 2021-07-02 RX ADMIN — Medication 1 MILLIGRAM(S): at 08:21

## 2021-07-02 NOTE — BH INPATIENT PSYCHIATRY PROGRESS NOTE - CASE SUMMARY
Pt to continue working with PT to wean from walker, dispo at this time remains CRISSY.    No active positive symptoms of psychosis at this time.  Tapering lorazepam to 1 mg bid.  Continue rest of regimen. No clinical changes at this time, tolerating lorazepam taper, awaiting CRISSY.  Continue meds as ordered.

## 2021-07-02 NOTE — BH INPATIENT PSYCHIATRY PROGRESS NOTE - NSBHASSESSSUMMFT_PSY_ALL_CORE
Catatonia resolved with no psychosis. Pt continues to have improved mood, ADLs improving. CR[facility]SY application submitted, continue to work with PT.  Continue slow Ativan taper to avoid relapse. On 6/29/21, lowered to 1 mg in morning and evening, 0.5 mg in afternoon (2.5 mg total). 07/01, discontinued evening dose and currently on Ativan 1mg BID.      45M w/schizophrenia, admitted with catatonia and suspected psychosis/depression.      Catatonia resolved with no psychosis. Pt continues to have improved mood, ADLs improving. CR[facility]SY application submitted, continue to work with PT.  Continue slow Ativan taper to avoid relapse. On 6/29/21, lowered to 1 mg in morning and evening, 0.5 mg in afternoon (2.5 mg total). 07/01, discontinued evening dose and currently on Ativan 1mg BID.

## 2021-07-02 NOTE — BH INPATIENT PSYCHIATRY PROGRESS NOTE - MSE UNSTRUCTURED FT
Dressed in hospital gown, seen sitting down in day room watching tv, calm, cooperative and pleasant.  Speech normal rate and tone.  Mood is "good," affect pleasant, smiling.  [place], goal-oriented and linear TP; able to have conversation about his interest, TC denies AVH, SI, HI or delusions.  Insight limited, judgment fair, attention good, language fluent, gait/station: seated, still using walker.

## 2021-07-02 NOTE — BH INPATIENT PSYCHIATRY PROGRESS NOTE - NSBHFUPINTERVALHXFT_PSY_A_CORE
No overnight events. Found in day room watching TV. Remains active on unit. Did not attend group therapy yesterday. Pt states he is feeling good with good appetite and sleep. He states he has not spoken to mother yet.  He doing well with his ambulation. Otherwise no complaints or needs this AM.

## 2021-07-03 RX ADMIN — Medication 1 MILLIGRAM(S): at 20:46

## 2021-07-03 RX ADMIN — MEMANTINE HYDROCHLORIDE 10 MILLIGRAM(S): 10 TABLET ORAL at 08:27

## 2021-07-03 RX ADMIN — RISPERIDONE 4 MILLIGRAM(S): 4 TABLET ORAL at 08:27

## 2021-07-03 RX ADMIN — AMLODIPINE BESYLATE 5 MILLIGRAM(S): 2.5 TABLET ORAL at 08:27

## 2021-07-03 RX ADMIN — MEMANTINE HYDROCHLORIDE 10 MILLIGRAM(S): 10 TABLET ORAL at 20:46

## 2021-07-03 RX ADMIN — SERTRALINE 100 MILLIGRAM(S): 25 TABLET, FILM COATED ORAL at 08:27

## 2021-07-03 RX ADMIN — Medication 1 MILLIGRAM(S): at 08:27

## 2021-07-04 RX ADMIN — Medication 1 MILLIGRAM(S): at 21:02

## 2021-07-04 RX ADMIN — MEMANTINE HYDROCHLORIDE 10 MILLIGRAM(S): 10 TABLET ORAL at 21:02

## 2021-07-04 RX ADMIN — RISPERIDONE 4 MILLIGRAM(S): 4 TABLET ORAL at 08:15

## 2021-07-04 RX ADMIN — SERTRALINE 100 MILLIGRAM(S): 25 TABLET, FILM COATED ORAL at 08:15

## 2021-07-04 RX ADMIN — Medication 1 MILLIGRAM(S): at 08:15

## 2021-07-04 RX ADMIN — AMLODIPINE BESYLATE 5 MILLIGRAM(S): 2.5 TABLET ORAL at 08:15

## 2021-07-04 RX ADMIN — MEMANTINE HYDROCHLORIDE 10 MILLIGRAM(S): 10 TABLET ORAL at 08:15

## 2021-07-05 RX ADMIN — MEMANTINE HYDROCHLORIDE 10 MILLIGRAM(S): 10 TABLET ORAL at 20:19

## 2021-07-05 RX ADMIN — MEMANTINE HYDROCHLORIDE 10 MILLIGRAM(S): 10 TABLET ORAL at 08:17

## 2021-07-05 RX ADMIN — Medication 1 MILLIGRAM(S): at 08:17

## 2021-07-05 RX ADMIN — SERTRALINE 100 MILLIGRAM(S): 25 TABLET, FILM COATED ORAL at 08:16

## 2021-07-05 RX ADMIN — Medication 1 MILLIGRAM(S): at 20:19

## 2021-07-05 RX ADMIN — AMLODIPINE BESYLATE 5 MILLIGRAM(S): 2.5 TABLET ORAL at 08:17

## 2021-07-05 RX ADMIN — RISPERIDONE 4 MILLIGRAM(S): 4 TABLET ORAL at 08:17

## 2021-07-06 PROCEDURE — 99231 SBSQ HOSP IP/OBS SF/LOW 25: CPT

## 2021-07-06 RX ADMIN — SERTRALINE 100 MILLIGRAM(S): 25 TABLET, FILM COATED ORAL at 08:08

## 2021-07-06 RX ADMIN — MEMANTINE HYDROCHLORIDE 10 MILLIGRAM(S): 10 TABLET ORAL at 20:34

## 2021-07-06 RX ADMIN — Medication 1 MILLIGRAM(S): at 09:50

## 2021-07-06 RX ADMIN — RISPERIDONE 4 MILLIGRAM(S): 4 TABLET ORAL at 08:08

## 2021-07-06 RX ADMIN — Medication 1 MILLIGRAM(S): at 20:34

## 2021-07-06 RX ADMIN — AMLODIPINE BESYLATE 5 MILLIGRAM(S): 2.5 TABLET ORAL at 08:08

## 2021-07-06 RX ADMIN — MEMANTINE HYDROCHLORIDE 10 MILLIGRAM(S): 10 TABLET ORAL at 08:09

## 2021-07-06 RX ADMIN — Medication 1 MILLIGRAM(S): at 08:08

## 2021-07-06 NOTE — BH INPATIENT PSYCHIATRY PROGRESS NOTE - NSBHFUPINTERVALHXFT_PSY_A_CORE
No overnight events. Found in day room watching TV. Remains active on unit. Pt states he is feeling good with good appetite and sleep. He was able to speak to mom during weekend and states she is doing well now. Pt reports watching the [employer] game. He was able to verbalize that his favorite team are the [employer] but they lost 2-3. He denies any side effects or concerns with Ativan taper.  He doing well with his ambulation and was evaluated during the weekend for subacute rehab. Otherwise no complaints or needs this AM. No overnight events. Found in day room watching TV. Remains active on unit. Pt states he is feeling good with good appetite and sleep. He was able to speak to mom during weekend and states she is doing well now. Pt reports watching the [employer] game. He was able to verbalize that his favorite team are the [employer] but they lost 2-3. He denies any side effects or concerns with Ativan taper.  He reports doing well with his ambulation. Otherwise no complaints or needs this AM.

## 2021-07-06 NOTE — BH INPATIENT PSYCHIATRY PROGRESS NOTE - NSBHASSESSSUMMFT_PSY_ALL_CORE
45M w/schizophrenia, admitted with catatonia and suspected psychosis/depression.      Catatonia resolved with no psychosis. Pt continues to have improved mood, ADLs improving.    Plan:  - Pt had CRISSY evaluation this weekend, waiting for response  - Continue Ativan 1 mg BID, will continue to taper slowly to prevent relapse   - 6/29/21, lowered to 1 mg in morning and evening, 0.5 mg in afternoon (2.5 mg total)  - 07/01/21, discontinued evening dose and currently on Ativan 1mg BID     45M w/schizophrenia, admitted with catatonia and suspected psychosis/depression.      Catatonia resolved with no psychosis. Pt continues to have improved mood, ADLs improving.    Plan:  - Pt had interview for [facility] on Friday, waiting for response  - Continue Ativan 1 mg BID, will continue to taper slowly to prevent relapse   - 6/29/21, lowered to 1 mg in morning and evening, 0.5 mg in afternoon (2.5 mg total)  - 07/01/21, discontinued evening dose and currently on Ativan 1mg BID

## 2021-07-06 NOTE — BH INPATIENT PSYCHIATRY PROGRESS NOTE - MSE UNSTRUCTURED FT
Dressed in hospital gown, seen sitting down in day room watching tv, calm, cooperative and pleasant.  Speech normal rate and tone.  Mood is "good," affect pleasant, smiling.  TP: Coherent, goal-oriented and linear, able to have conversation about his interest. TC: denies AVH, SI, HI or delusions.  Insight: limited, Judgment: fair, Attention: good, gait/station: walking upright with walker, improved gait

## 2021-07-06 NOTE — BH INPATIENT PSYCHIATRY PROGRESS NOTE - CASE SUMMARY
No clinical changes at this time, tolerating lorazepam taper, awaiting CRISSY.  Continue meds as ordered. Continues to be at medicated baseline.  Graduated off RW today by PT.  Continuing PT.

## 2021-07-07 PROCEDURE — 99231 SBSQ HOSP IP/OBS SF/LOW 25: CPT

## 2021-07-07 RX ORDER — AMLODIPINE BESYLATE 2.5 MG/1
10 TABLET ORAL DAILY
Refills: 0 | Status: DISCONTINUED | OUTPATIENT
Start: 2021-07-08 | End: 2021-07-19

## 2021-07-07 RX ORDER — AMLODIPINE BESYLATE 2.5 MG/1
10 TABLET ORAL DAILY
Refills: 0 | Status: DISCONTINUED | OUTPATIENT
Start: 2021-07-07 | End: 2021-07-07

## 2021-07-07 RX ADMIN — Medication 1 MILLIGRAM(S): at 20:51

## 2021-07-07 RX ADMIN — AMLODIPINE BESYLATE 5 MILLIGRAM(S): 2.5 TABLET ORAL at 08:17

## 2021-07-07 RX ADMIN — MEMANTINE HYDROCHLORIDE 10 MILLIGRAM(S): 10 TABLET ORAL at 08:17

## 2021-07-07 RX ADMIN — Medication 1 MILLIGRAM(S): at 08:18

## 2021-07-07 RX ADMIN — MEMANTINE HYDROCHLORIDE 10 MILLIGRAM(S): 10 TABLET ORAL at 20:51

## 2021-07-07 RX ADMIN — RISPERIDONE 4 MILLIGRAM(S): 4 TABLET ORAL at 08:17

## 2021-07-07 RX ADMIN — SERTRALINE 100 MILLIGRAM(S): 25 TABLET, FILM COATED ORAL at 08:17

## 2021-07-07 NOTE — BH INPATIENT PSYCHIATRY PROGRESS NOTE - NSBHASSESSSUMMFT_PSY_ALL_CORE
45M w/schizophrenia, admitted with catatonia and suspected psychosis/depression.      Catatonia resolved with no psychosis. Pt continues to have improved mood, ADLs improving.    Plan:  - Pt had interview for [facility] on Friday, waiting for response - graduated from RW and still working with PT  - Pt BP in high 140s-150s systolic - per previous medicine consult recommendations increasing 5 mg amlodipine to 10 mg  - Continue Ativan 1 mg BID  - 6/29/21, lowered to 1 mg in morning and evening, 0.5 mg in afternoon (2.5 mg total)  - 07/01/21, discontinued evening dose and currently on Ativan 1mg BID

## 2021-07-07 NOTE — BH INPATIENT PSYCHIATRY PROGRESS NOTE - MSE UNSTRUCTURED FT
Pt is dressed in hospital gown, sitting down in day room watching tv, calm, cooperative and pleasant.  Speech normal rate and tone.  Mood is "good," affect pleasant, smiling.  TP: Coherent, goal-oriented and linear. TC: Denies AVH, SI, HI or delusions.  Insight: limited, Judgment: fair, Attention: good, Gait/station: good, walking without walker.

## 2021-07-07 NOTE — BH INPATIENT PSYCHIATRY PROGRESS NOTE - CASE SUMMARY
Continues to be at medicated baseline.  Graduated off RW today by PT.  Continuing PT.   Pt at this time remains at medicated baseline.  Unclear dispo at this time.  CRISSY?  but home may not be option due to mother inability to care for pt.

## 2021-07-07 NOTE — BH INPATIENT PSYCHIATRY PROGRESS NOTE - NSBHFUPINTERVALHXFT_PSY_A_CORE
No overnight events. Found in day room watching TV. Remains active on unit. Pt states mood is "good". Sleep and appetite is good. Pt went to music therapy which he said he enjoyed. He denies any side effects or concerns with Ativan taper.  He reports doing well with his ambulation. Yesterday he graduated from walker and is walking on his own. He mentioned having   helping him continue to strengthening. Otherwise no complaints or needs this AM.    Spoke to mother yesterday and she stated that she is looking for assisted living facility due to needing more help after the death of her  and her poor health condition. She was not able to say if this facility would allow Mr. Ibarra to also come.

## 2021-07-08 PROCEDURE — 99231 SBSQ HOSP IP/OBS SF/LOW 25: CPT

## 2021-07-08 RX ADMIN — AMLODIPINE BESYLATE 10 MILLIGRAM(S): 2.5 TABLET ORAL at 08:04

## 2021-07-08 RX ADMIN — SERTRALINE 100 MILLIGRAM(S): 25 TABLET, FILM COATED ORAL at 08:04

## 2021-07-08 RX ADMIN — Medication 1 MILLIGRAM(S): at 20:48

## 2021-07-08 RX ADMIN — Medication 1 MILLIGRAM(S): at 08:03

## 2021-07-08 RX ADMIN — RISPERIDONE 4 MILLIGRAM(S): 4 TABLET ORAL at 08:03

## 2021-07-08 RX ADMIN — MEMANTINE HYDROCHLORIDE 10 MILLIGRAM(S): 10 TABLET ORAL at 20:47

## 2021-07-08 RX ADMIN — MEMANTINE HYDROCHLORIDE 10 MILLIGRAM(S): 10 TABLET ORAL at 08:04

## 2021-07-08 NOTE — BH INPATIENT PSYCHIATRY PROGRESS NOTE - NSBHASSESSSUMMFT_PSY_ALL_CORE
45M w/schizophrenia, admitted with catatonia and suspected psychosis/depression.      Catatonia resolved with no psychosis. Pt continues to have improved mood, ADLs improving.    Plan:  - Pt had interview for [facility] on 7/2, waiting for response  - Continue amlodipine 10 mg for HTN   - Continue Ativan 1 mg BID  - Will discuss possible dispo plan   - 6/29/21, lowered to 1 mg in morning and evening, 0.5 mg in afternoon (2.5 mg total)  - 07/01/21, discontinued evening dose and currently on Ativan 1mg BID

## 2021-07-08 NOTE — BH INPATIENT PSYCHIATRY PROGRESS NOTE - NSBHFUPINTERVALHXFT_PSY_A_CORE
No overnight events. Found in day room watching TV. Remains active on unit. Pt states mood is "good". Sleep and appetite is good. Pt spoke to mother and she is going well. He denies any side effects or concerns with Ativan taper.  He reports doing well with his ambulation. Discussed with him increase in his blood pressure medication to address HTN. Otherwise no complaints or needs this AM.

## 2021-07-08 NOTE — BH INPATIENT PSYCHIATRY PROGRESS NOTE - MSE UNSTRUCTURED FT
Pt is dressed in hospital gown, sitting down in day room watching tv, calm, cooperative and pleasant.  Speech normal rate and tone.  Mood is "good," affect pleasant, smiling.  TP: Coherent, goal-oriented and linear. TC: Denies AVH, SI, HI or delusions.  Insight: limited, Judgment: fair, Attention: good, Gait/station: good

## 2021-07-08 NOTE — BH INPATIENT PSYCHIATRY PROGRESS NOTE - CASE SUMMARY
Pt at this time remains at medicated baseline.  Unclear dispo at this time.  CRISSY?  but home may not be option due to mother inability to care for pt. No significant clinical change.    Unclear dispo.  Pt was under care of father, who passed away during this admission.  Mother will be reportedly moving into [place].  May need to consider HRA for pt.    Continue meds for now.

## 2021-07-09 PROCEDURE — 99231 SBSQ HOSP IP/OBS SF/LOW 25: CPT

## 2021-07-09 RX ADMIN — AMLODIPINE BESYLATE 10 MILLIGRAM(S): 2.5 TABLET ORAL at 08:14

## 2021-07-09 RX ADMIN — SERTRALINE 100 MILLIGRAM(S): 25 TABLET, FILM COATED ORAL at 08:15

## 2021-07-09 RX ADMIN — Medication 1 MILLIGRAM(S): at 21:29

## 2021-07-09 RX ADMIN — RISPERIDONE 4 MILLIGRAM(S): 4 TABLET ORAL at 08:15

## 2021-07-09 RX ADMIN — Medication 1 MILLIGRAM(S): at 08:15

## 2021-07-09 RX ADMIN — MEMANTINE HYDROCHLORIDE 10 MILLIGRAM(S): 10 TABLET ORAL at 21:29

## 2021-07-09 RX ADMIN — MEMANTINE HYDROCHLORIDE 10 MILLIGRAM(S): 10 TABLET ORAL at 08:14

## 2021-07-09 NOTE — BH INPATIENT PSYCHIATRY PROGRESS NOTE - CASE SUMMARY
Report given jayne for transfer to [facility]; xray of abdomen appears to have ng tube in right place; passed info along to jayne; will cont to monitor; nephew Rohan notified of transfer to medical floor via recliner chair; just worked with PT/OT   No significant clinical change.    Unclear dispo.  Pt was under care of father, who passed away during this admission.  Mother will be reportedly moving into [place].  May need to consider HRA for pt.    Continue meds for now. Remains in good behavioral control without any observable positive symptoms of psychosis.    Continue meds.    Dispo unclear.  PT reports that without support in home post discharge, pt would likely continue to need CRISSY.

## 2021-07-09 NOTE — BH INPATIENT PSYCHIATRY PROGRESS NOTE - MSE UNSTRUCTURED FT
Pt is dressed in hospital gown, sitting down in day room watching tv, calm, cooperative and pleasant.  Speech normal rate and tone.  Mood is "good," affect pleasant, smiling.  TP: Coherent, goal-oriented and linear. TC: Denies AVH, SI, HI or delusions.  Insight: limited, Judgment: fair, Attention: good, Gait/station: good Pt is dressed in hospital gown, sitting down in day room watching tv, calm, cooperative and pleasant.  Speech normal rate and tone.  Mood is "good," affect pleasant, smiling. TP: Coherent, goal-oriented and linear. TC: Denies AVH, SI, HI or delusions.  Insight: limited, Judgment: fair, Attention: good, Gait/station: good

## 2021-07-09 NOTE — BH INPATIENT PSYCHIATRY PROGRESS NOTE - NSBHASSESSSUMMFT_PSY_ALL_CORE
45M w/schizophrenia, admitted with catatonia and suspected psychosis/depression.      Catatonia resolved with no psychosis. Pt continues to have improved mood, ADLs improving.    Plan:  - Pt had interview for CRISSY on 7/2, waiting for response  - Continue amlodipine 10 mg for HTN   - Continue Ativan 1 mg BID  - Will discuss possible dispo plan   - 6/29/21, lowered to 1 mg in morning and evening, 0.5 mg in afternoon (2.5 mg total)  - 07/01/21, discontinued evening dose and currently on Ativan 1mg BID     45M w/schizophrenia, admitted with catatonia and suspected psychosis/depression.      Catatonia resolved with no psychosis. Pt continues to have improved mood, ADLs improving. Pt has been walking well and will most likely not need CRISSY. Mother cannot care for him.    Plan:  - Continue amlodipine 10 mg for HTN, tolerating well  - Continue Ativan 1 mg BID   - 6/29/21, lowered to 1 mg in morning and evening, 0.5 mg in afternoon (2.5 mg total)  - 07/01/21, discontinued evening dose and currently on Ativan 1mg BID     45M w/schizophrenia, admitted with catatonia and suspected psychosis/depression.      Catatonia resolved with no psychosis. Pt continues to have improved mood, ADLs improving. Pt has been walking well and will most likely not need CRISSY. Mother cannot care for him.    Plan:  - Continue amlodipine 10 mg for HTN, tolerating well  - Continue Ativan 1 mg BID   - 6/29/21, lowered to 1 mg in morning and evening, 0.5 mg in afternoon (2.5 mg total)  - 07/01/21, discontinued evening dose and currently on Ativan 1mg BID  - Contact brother for dispo plans   - Will continue meeting with PT; will not be cleared unless has supervision and help at home     45M w/schizophrenia, admitted with catatonia and suspected psychosis/depression.      Catatonia resolved with no psychosis. Pt continues to have improved mood, ADLs improving. Pt has been walking well and will most likely not need CRISSY. Mother cannot care for him.    Plan:  - Continue meds  - Contact brother for dispo plans   - Will continue meeting with PT; will not be cleared unless has supervision and help at home

## 2021-07-09 NOTE — BH INPATIENT PSYCHIATRY PROGRESS NOTE - NSBHFUPINTERVALHXFT_PSY_A_CORE
No overnight events. Found in day room watching TV. Remains active on unit. Pt states mood is "good". Sleep and appetite is good. Pt spoke to mother and she is going well. He denies any side effects or concerns with Ativan taper.  He reports doing well with his ambulation. Discussed with him increase in his blood pressure medication to address HTN. Otherwise no complaints or needs this AM. No overnight events. Found in day room watching TV. Remains active on unit. Pt states mood is "good". Sleep and appetite is good. Yesterday, he said he went to music therapy and listened to Kam Henry. He also was playing with carmina which he enjoyed. Pt spoke to mother and she is doing well. Hs is tolerating increase in BP medication, no dizziness. He reports doing well with his ambulation. Otherwise no complaints or needs this AM.    Spoke to mother about possible disposition plan. Currently, she does not believe she can take him in. Pt's sister is looking for assisted living facility but has not found one yet. She does not believe it will be feasible for any other family members to care for him because everyone else works. She is inquiring any facilities where he can go. Unwilling to take even with daytime program given her poor health condition.   No overnight events. Found in day room watching TV. Remains active on unit. Pt states mood is "good". Sleep and appetite is good. Yesterday, he said he went to music therapy and listened to Kam Henry. He also was playing with carmina which he enjoyed. Pt spoke to mother and she is doing well. Hs is tolerating increase in BP medication, no dizziness. He reports doing well with his ambulation. Otherwise no complaints or needs this AM.    Spoke to mother about possible disposition plan. Currently, she does not believe she can take him in. Pt's sister is looking for assisted living facility but has not found one yet. She does not believe it will be feasible for any other family members to care for him because everyone else works. She is inquiring any facilities where he can go. Unwilling to take even with daytime program given her poor health condition.  Unable to contact brother.

## 2021-07-10 RX ADMIN — SERTRALINE 100 MILLIGRAM(S): 25 TABLET, FILM COATED ORAL at 08:37

## 2021-07-10 RX ADMIN — RISPERIDONE 4 MILLIGRAM(S): 4 TABLET ORAL at 08:37

## 2021-07-10 RX ADMIN — MEMANTINE HYDROCHLORIDE 10 MILLIGRAM(S): 10 TABLET ORAL at 20:38

## 2021-07-10 RX ADMIN — Medication 1 MILLIGRAM(S): at 08:37

## 2021-07-10 RX ADMIN — AMLODIPINE BESYLATE 10 MILLIGRAM(S): 2.5 TABLET ORAL at 08:37

## 2021-07-10 RX ADMIN — Medication 1 MILLIGRAM(S): at 20:39

## 2021-07-10 RX ADMIN — MEMANTINE HYDROCHLORIDE 10 MILLIGRAM(S): 10 TABLET ORAL at 08:37

## 2021-07-11 RX ADMIN — SERTRALINE 100 MILLIGRAM(S): 25 TABLET, FILM COATED ORAL at 08:41

## 2021-07-11 RX ADMIN — Medication 1 MILLIGRAM(S): at 20:40

## 2021-07-11 RX ADMIN — AMLODIPINE BESYLATE 10 MILLIGRAM(S): 2.5 TABLET ORAL at 08:41

## 2021-07-11 RX ADMIN — RISPERIDONE 4 MILLIGRAM(S): 4 TABLET ORAL at 08:42

## 2021-07-11 RX ADMIN — Medication 1 MILLIGRAM(S): at 08:42

## 2021-07-11 RX ADMIN — MEMANTINE HYDROCHLORIDE 10 MILLIGRAM(S): 10 TABLET ORAL at 20:40

## 2021-07-11 RX ADMIN — MEMANTINE HYDROCHLORIDE 10 MILLIGRAM(S): 10 TABLET ORAL at 08:41

## 2021-07-12 PROCEDURE — 99231 SBSQ HOSP IP/OBS SF/LOW 25: CPT

## 2021-07-12 RX ADMIN — MEMANTINE HYDROCHLORIDE 10 MILLIGRAM(S): 10 TABLET ORAL at 09:03

## 2021-07-12 RX ADMIN — Medication 1 MILLIGRAM(S): at 20:43

## 2021-07-12 RX ADMIN — Medication 1 MILLIGRAM(S): at 09:03

## 2021-07-12 RX ADMIN — AMLODIPINE BESYLATE 10 MILLIGRAM(S): 2.5 TABLET ORAL at 09:03

## 2021-07-12 RX ADMIN — SERTRALINE 100 MILLIGRAM(S): 25 TABLET, FILM COATED ORAL at 09:03

## 2021-07-12 RX ADMIN — RISPERIDONE 4 MILLIGRAM(S): 4 TABLET ORAL at 09:03

## 2021-07-12 RX ADMIN — MEMANTINE HYDROCHLORIDE 10 MILLIGRAM(S): 10 TABLET ORAL at 20:43

## 2021-07-12 NOTE — BH INPATIENT PSYCHIATRY PROGRESS NOTE - MSE UNSTRUCTURED FT
Pt is dressed in hospital gown, sitting down in day room watching tv. Pt has good eye contact, and cooperative and pleasant.  Speech normal rate and tone.  Mood is "good," Affect: congruent. TP: Coherent, goal-oriented and linear. TC: Denies AVH, SI, HI or delusions.  Insight: limited, Judgment: fair, Attention: good, Gait/station: good

## 2021-07-12 NOTE — BH INPATIENT PSYCHIATRY PROGRESS NOTE - NSBHFUPINTERVALHXFT_PSY_A_CORE
No overnight events. Pt says mood is "good." No changes in sleep or appetite. Tolerating the Ativan 1mg BID, does not exhibit any internal preoccupation or AVH. Pt has been feeling stable on his foot and has been working with PT to continue working on strength. Pt's BP continues to run around 150's systolic, will monitor.

## 2021-07-12 NOTE — BH INPATIENT PSYCHIATRY PROGRESS NOTE - NSBHASSESSSUMMFT_PSY_ALL_CORE
45M w/schizophrenia, admitted with catatonia and suspected psychosis/depression.      Catatonia resolved with no psychosis. Pt continues to have stable mood, ADLs improving with PT. Pt has been walking well and will most likely not need CRISSY, but PT will not clear him until about to care for oneself. Mother cannot care for him and is looking for assisted living facility. Will reach out to brother for possible dispo plan.     Plan:  - Continue meds Ativan 1 mg BID   - Contact brother for dispo plans   - Pt will continue to meet with PT; will not be cleared unless has supervision and help at home or able to develop skills to live independently

## 2021-07-12 NOTE — BH INPATIENT PSYCHIATRY PROGRESS NOTE - CASE SUMMARY
Remains in good behavioral control without any observable positive symptoms of psychosis.    Continue meds.    Dispo unclear.  PT reports that without support in home post discharge, pt would likely continue to need CRISSY. Continues to be at medicated baseline.  As per PT last week, if pt does not have support in home, he must go CRISSY.      Pt approved for CRISSY today.  Will begin search for facility.

## 2021-07-13 PROCEDURE — 99231 SBSQ HOSP IP/OBS SF/LOW 25: CPT

## 2021-07-13 RX ADMIN — Medication 1 MILLIGRAM(S): at 08:20

## 2021-07-13 RX ADMIN — RISPERIDONE 4 MILLIGRAM(S): 4 TABLET ORAL at 08:19

## 2021-07-13 RX ADMIN — SERTRALINE 100 MILLIGRAM(S): 25 TABLET, FILM COATED ORAL at 08:19

## 2021-07-13 RX ADMIN — AMLODIPINE BESYLATE 10 MILLIGRAM(S): 2.5 TABLET ORAL at 08:20

## 2021-07-13 RX ADMIN — MEMANTINE HYDROCHLORIDE 10 MILLIGRAM(S): 10 TABLET ORAL at 08:19

## 2021-07-13 RX ADMIN — Medication 1 MILLIGRAM(S): at 20:24

## 2021-07-13 RX ADMIN — MEMANTINE HYDROCHLORIDE 10 MILLIGRAM(S): 10 TABLET ORAL at 20:17

## 2021-07-13 NOTE — BH INPATIENT PSYCHIATRY PROGRESS NOTE - NSBHFUPINTERVALHXFT_PSY_A_CORE
No overnight events. Pt says mood is "good." No changes in sleep or appetite. Tolerating the Ativan 1mg BID, no side effects. Pt has been feeling stable on his foot and has been working with PT to continue working on strength. Informed pt that he was accepted for subacute rehab and is feeling good about it. Pt says he attending group therapy yesterday which was on coping skills. He felt it was useful.

## 2021-07-13 NOTE — BH INPATIENT PSYCHIATRY PROGRESS NOTE - CASE SUMMARY
Continues to be at medicated baseline.  As per PT last week, if pt does not have support in home, he must go CRISSY.      Pt approved for CRISSY today.  Will begin search for facility. No positive symptoms of psychosis.  Continue meds.  Dispo: CRISSY.  As per PT last week, if pt does not have support in home, he must go CRISSY.

## 2021-07-13 NOTE — BH INPATIENT PSYCHIATRY PROGRESS NOTE - NSBHASSESSSUMMFT_PSY_ALL_CORE
45M w/schizophrenia, admitted with catatonia and suspected psychosis/depression.      Catatonia resolved with no psychosis. Pt continues to have stable mood, ADLs improving with PT. Pt has been walking steadily around unit. Pt has been accepted to [facility] and awaiting placement. Mother cannot care for him and is looking for assisted living facility.     Plan:  - Continue meds Ativan 1 mg BID   -Await [facility] placement

## 2021-07-14 PROCEDURE — 99231 SBSQ HOSP IP/OBS SF/LOW 25: CPT

## 2021-07-14 RX ADMIN — Medication 1 MILLIGRAM(S): at 08:09

## 2021-07-14 RX ADMIN — Medication 1 MILLIGRAM(S): at 20:07

## 2021-07-14 RX ADMIN — MEMANTINE HYDROCHLORIDE 10 MILLIGRAM(S): 10 TABLET ORAL at 08:10

## 2021-07-14 RX ADMIN — SERTRALINE 100 MILLIGRAM(S): 25 TABLET, FILM COATED ORAL at 08:09

## 2021-07-14 RX ADMIN — RISPERIDONE 4 MILLIGRAM(S): 4 TABLET ORAL at 08:09

## 2021-07-14 RX ADMIN — AMLODIPINE BESYLATE 10 MILLIGRAM(S): 2.5 TABLET ORAL at 08:09

## 2021-07-14 RX ADMIN — MEMANTINE HYDROCHLORIDE 10 MILLIGRAM(S): 10 TABLET ORAL at 20:07

## 2021-07-14 NOTE — BH INPATIENT PSYCHIATRY PROGRESS NOTE - MSE UNSTRUCTURED FT
Pt is dressed in hospital gown, sitting down in day room watching tv. Pt has good eye contact, cooperative and pleasant.  Speech normal rate and decrease volume.  Mood is "good." Affect: congruent. Euthymic. TP: Coherent, goal-oriented and linear. TC: Denies AVH, SI, HI or delusions.  Insight: limited, Judgment: fair, Attention: good, Gait/station: good

## 2021-07-14 NOTE — BH INPATIENT PSYCHIATRY PROGRESS NOTE - NSBHASSESSSUMMFT_PSY_ALL_CORE
45M w/schizophrenia, admitted with catatonia and suspected psychosis/depression.      Catatonia resolved with no psychosis. Pt continues to have stable mood, ADLs improving with PT. Pt has been accepted to [facility] and awaiting placement. Mother cannot care for him afterwards due to declining health and is looking for assisted living facility.     Plan:  - Continue meds Ativan 1 mg BID   -Await [facility] placement

## 2021-07-14 NOTE — BH INPATIENT PSYCHIATRY PROGRESS NOTE - CASE SUMMARY
No positive symptoms of psychosis.  Continue meds.  Dispo: [facility].  As per PT last week, if pt does not have support in home, he must go [facility].

## 2021-07-14 NOTE — BH INPATIENT PSYCHIATRY PROGRESS NOTE - NSBHFUPINTERVALHXFT_PSY_A_CORE
No overnight events. Pt says mood is "good." Sleep and appetite is good. Spoke to mom today and mother doing well. No side effects or complaints.

## 2021-07-15 PROCEDURE — 99231 SBSQ HOSP IP/OBS SF/LOW 25: CPT

## 2021-07-15 RX ADMIN — Medication 1 MILLIGRAM(S): at 21:26

## 2021-07-15 RX ADMIN — RISPERIDONE 4 MILLIGRAM(S): 4 TABLET ORAL at 08:17

## 2021-07-15 RX ADMIN — MEMANTINE HYDROCHLORIDE 10 MILLIGRAM(S): 10 TABLET ORAL at 21:27

## 2021-07-15 RX ADMIN — Medication 1 MILLIGRAM(S): at 08:17

## 2021-07-15 RX ADMIN — SERTRALINE 100 MILLIGRAM(S): 25 TABLET, FILM COATED ORAL at 08:17

## 2021-07-15 RX ADMIN — AMLODIPINE BESYLATE 10 MILLIGRAM(S): 2.5 TABLET ORAL at 08:17

## 2021-07-15 RX ADMIN — MEMANTINE HYDROCHLORIDE 10 MILLIGRAM(S): 10 TABLET ORAL at 08:17

## 2021-07-15 NOTE — BH TREATMENT PLAN - NSTXCAREGIVERPARTICIPATE_PSY_P_CORE
Family/Caregiver participated in identification of needs/problems/goals for treatment
Family/Caregiver participated in identification of needs/problems/goals for treatment/Family/Caregiver participated in defining interventions

## 2021-07-15 NOTE — BH TREATMENT PLAN - NSTXPSYCHOINTERRN_PSY_ALL_CORE
assess pt for changes in behavior
Assess patient regularly for active A/V hallucinations, delusional thinking and other signs of psychosis. Provide reality orientation as needed.
Assess patient for signs and symptoms of psychosis.
Assess patient for A/V Hallunication. Provide reorientation and redirection as needed.
Assess patient for signs and symptoms of psychosis.
Assess patient regularly for active A/V hallucinations, delusional thinking and other signs of psychosis. Provide reality orientation as needed.
Assess pt for s/s of psychosis. Pt less psychotic, assessment ongoing.
assess pt for changes in behavior
.Assess patient for A/V hallucination. Provide reassurance, support.  Provide teaching on effective coping skills, stress management, relaxation techniques.

## 2021-07-15 NOTE — BH TREATMENT PLAN - NSPTSTATEDGOAL_PSY_ALL_CORE
return home
return home
Pt unable to answer
return home

## 2021-07-15 NOTE — BH TREATMENT PLAN - NSTXPSYCHOINTERMD_PSY_ALL_CORE
Medication management and psychotherapy 
Medication management and psychotherapy. 
Medication management and psychotherapy 
Medication management and psychotherapy

## 2021-07-15 NOTE — BH TREATMENT PLAN - NSTXSLFCREINTERPR_PSY_ALL_CORE
Patient’s psychiatric rehabilitation goal is to meet with staff individually and attend psychiatric rehabilitation groups, in order for patient to demonstrate the ability to care for self in two areas such as feeding onself and hygine for better symptom management and sustained recovery within 7 days.
Patient will engage in 1:1 interventions and psych rehab groups with psychiatric rehabilitation staff in order to facilitate progress towards his goal for sustained recovery by day 7.
Patient will engage in 1:1 interventions and psych rehab groups with psychiatric rehabilitation staff in order to facilitate progress towards his goal for sustained recovery by day 7.
Patient’s psychiatric rehabilitation goal is to meet with staff individually and attend psychiatric rehabilitation groups, in order for patient to demonstrate the ability to care for self in two areas such as feeding onself and hygine for better symptom management and sustained recovery within 7 days.
Patient’s psychiatric rehabilitation goal is to meet with staff individually and attend psychiatric rehabilitation groups, in order for patient to demonstrate the ability to care for self in two areas such as feeding oneself and hygiene for better symptom management and sustained recovery within 7 days.
Patient’s psychiatric rehabilitation goal is to meet with staff individually and attend psychiatric rehabilitation groups, in order for patient to demonstrate the ability to care for self in two areas such as feeding oneself and hygiene for better symptom management and sustained recovery within 7 days.
Patient’s psychiatric rehabilitation goal is to meet with staff individually and attend psychiatric rehabilitation groups, in order for patient to demonstrate the ability to care for self in two areas such as feeding onself and hygiene for better symptom management and sustained recovery within 7 days.
Patient’s psychiatric rehabilitation goal is to meet with staff individually and attend psychiatric rehabilitation groups, in order for patient to demonstrate the ability to care for self in two areas such as feeding oneself and hygiene for better symptom management and sustained recovery within 7 days.
Patient met his psych rehab goal. Therefore, a novel goal is established: pt will perform ADLs without assistance/prompts daily. Patient will engage in 1:1 interventions and psych rehab groups with psychiatric rehabilitation staff in order to facilitate progress towards his goal for sustained recovery by day 7.
Patient will engage in 1:1 interventions and psych rehab groups with psychiatric rehabilitation staff in order to facilitate progress towards his goal for sustained recovery by day 7.

## 2021-07-15 NOTE — BH INPATIENT PSYCHIATRY PROGRESS NOTE - MSE UNSTRUCTURED FT
Appearance: Dressed in hospital gown, sitting down in day room watching tv. Behavior: Good eye contact, cooperative and pleasant during interview. Speech: normal rate and decrease volume. Movement: No PMR/A. Mood: "good." Affect: congruent. Euthymic. TP: Coherent, goal-oriented and linear. TC: Denies AVH, SI, HI or delusions.  Insight: limited, Judgment: fair, Attention: good, Gait/station: good

## 2021-07-15 NOTE — BH TREATMENT PLAN - NSTXSLFCREINTERMD_PSY_ALL_CORE
psychoeducation, encouragement of ADL's.  [facility]
psychoeducation, encouragement of ADL's
psychoeducation, encouragement of ADL's.  [facility]
psychoeducation, encouragement of ADL's.  [facility]

## 2021-07-15 NOTE — BH INPATIENT PSYCHIATRY PROGRESS NOTE - CASE SUMMARY
No positive symptoms of psychosis.  Continue meds.  Dispo: [facility].  As per PT last week, if pt does not have support in home, he must go [facility].   Patient continues to maintain gains, doing well on the unit.  Visible, and participating in some groups.  Continue current medications. Awaiting [facility].

## 2021-07-15 NOTE — BH TREATMENT PLAN - NSTXDCOPNOINTERMD_PSY_ALL_CORE
Provide education on the importance of follow-up with outpatient resources to continue being stable.

## 2021-07-15 NOTE — BH TREATMENT PLAN - NSTXPSYCHODATETRGT_PSY_ALL_CORE
03-Jun-2021
11-Jun-2021
27-May-2021
03-Jun-2021
27-May-2021
03-Jun-2021
03-Jun-2021

## 2021-07-15 NOTE — BH TREATMENT PLAN - NSTXDCOPNODATEEST_PSY_ALL_CORE
11-May-2021
12-Jul-2021

## 2021-07-15 NOTE — BH TREATMENT PLAN - NSTXDCOPNOINTERSW_PSY_ALL_CORE
Writer will educate the patient on the importance of med and treatment compliance and encourage him to accept appropriate referrals.
SW will educate the pt on the importance of meds and treatment compliance and encourage him to accept appropriate referrals.

## 2021-07-15 NOTE — BH SCALES AND SCREENS - NSBPRSBLUNTAFFECT_PSY_ALL_CORE
4 = Moderate – e.g., as above, but more intense, prolonged or frequent
1 = Not Observed (Not Present)

## 2021-07-15 NOTE — BH TREATMENT PLAN - NSTXPATIENTPARTICIPATE_PSY_ALL_CORE
Patient participated in identification of needs/problems/goals for treatment
Patient participated in identification of needs/problems/goals for treatment/Patient participated in defining interventions

## 2021-07-15 NOTE — BH TREATMENT PLAN - NSTXPROBDCOPNO_PSY_ALL_CORE
DISCHARGE ISSUE - NON-ADHERENT WITH OUTPATIENT SERVICES

## 2021-07-15 NOTE — BH TREATMENT PLAN - NSTXSLFCREDATETRGT_PSY_ALL_CORE
31-May-2021
18-May-2021
07-Jul-2021
20-Jun-2021
11-Jul-2021
13-Jun-2021
24-May-2021
04-Jul-2021
20-Jun-2021
21-Jul-2021

## 2021-07-15 NOTE — BH TREATMENT PLAN - NSTXDCOPNOGOAL_PSY_ALL_CORE
Will agree to participate in appropriate outpatient care

## 2021-07-15 NOTE — BH SCALES AND SCREENS - NSBPRSUNUTHOCON_PSY_ALL_CORE
1 = Not reported (Not Present)
7 = Very Severe – delusion(s) has major impact, e.g. stops eating because believes food is poisoned

## 2021-07-15 NOTE — BH TREATMENT PLAN - NSTXPSYCHODATEEST_PSY_ALL_CORE
20-May-2021
15-Jul-2021
20-May-2021

## 2021-07-15 NOTE — BH TREATMENT PLAN - NSTXSLFCREGOAL_PSY_ALL_CORE
Will perform ADLs without assistance/prompts daily
Be able to demonstrate the ability to care for self in 2 areas such as feeding oneself and hygiene
Will perform ADLs without assistance/prompts daily
Be able to demonstrate the ability to care for self in 2 areas such as feeding oneself and hygiene
Will engage in physical activity for 15 minutes daily

## 2021-07-15 NOTE — BH TREATMENT PLAN - NSCMSPTSTRENGTHS_PSY_ALL_CORE
Assertive/Financial stability/Intact employment/Intelligence/Interpersonal skills/Positive attitude/Supportive family
Assertive/Compliance to treatment/Financial stability/Intact employment/Intact family/Intelligence/Interpersonal skills/Positive attitude/Supportive family
Unable to obtain (specify)

## 2021-07-15 NOTE — BH INPATIENT PSYCHIATRY PROGRESS NOTE - NSBHFUPINTERVALHXFT_PSY_A_CORE
No overnight events. Pt says mood is "good." Sleep and appetite continue to be well. Patient attended dance therapy yesterday, dancing to disco music. He mentions was steady while dancing. No side effects from medication. no other complaints.

## 2021-07-15 NOTE — BH TREATMENT PLAN - NSBHPRIMARYDX_PSY_ALL_CORE
Schizophrenia    

## 2021-07-15 NOTE — BH TREATMENT PLAN - NSTXPLANTHERAPYSESSIONSFT_PSY_ALL_CORE
06-11-21  Type of therapy: Other,Physical therapy  Type of session: Individual  Level of patient participation: Engaged,Participated with encouragement  --  Therapy conducted by: Other (specify),Physical therapy  Therapy Summary: Patient participated in gait tringing  using RW to improve ambulation and Graded therex in sitting to promote B LE strength.    06-12-21  Type of therapy: Other,Physical therapy  Type of session: Individual  Level of patient participation: Engaged,Participated with encouragement  --  Therapy conducted by: Other (specify),Physical therapy  Therapy Summary: Patient participated in gait training using RW and graded therapeutic exercises in sitting.    06-13-21  Type of therapy: Leisure development,Psychoeducation  Type of session: Group  Level of patient participation: Quiet  Duration of participation: 15 minutes  Therapy conducted by: Psych rehab  Therapy Summary: Writer met with pt in order to review progress towards psychiatric rehabilitation goals over the past week. Writer met pt in dayroom while pt was watching TV. Pt appeared brighter affect. Pt was receptive to writer’s engagement. Pt reported that he feels “good”. Pt reported that he had a phone call with his mother and realized that his father is hospitalized due to heart disease.  Writer provided pt with emotional support. Overall, pt has demonstrated progress toward his psych rehab goal which is demonstrating the ability to care for self.  Pt's ADLs are improving with assistance.  Pt is able to feed himself and use walker to ambulate more often. Pt’s appearance is improving. Pt had hair cut last week. Writer encouraged pt continue taking care of himself.  Pt has demonstrated daily medication and treatment compliance with good effect. Pt has been increasingly visible and social on the unit. Pt has attended 20% of psych rehab groups but minimally engaged in groups. Pt was observed watching TV in dayroom. Pt denied SI/HI/AH/VH at this time. Pt’s insight is limited; judgment is improved.  
  07-04-21  Type of therapy: Leisure development,Music therapy  Type of session: Group  Level of patient participation: Participated with encouragement  Duration of participation: 30 minutes  Therapy conducted by: Psych rehab  Therapy Summary: Writer met pt to review progress towards psychiatric rehabilitation goals over the past week. Writer met pt in dayroom while pt was watching TV. Pt was pleasant and cooperative upon the approach. Pt’s thought process was linear; thought content is concrete. Pt reported that he feels “good, good sleep and appetite. Writer encouraged the pt to attend psych rehab groups for socialization and psychoeducation. Writer informed pt that we are going to have music group today. Pt reported that he likes old school rock music and named several his favorite bands. Pt has demonstrated overall progress toward his psych rehab goal which is demonstrating the ability to care for self.  Pt is able to maintain ADLs more independently. Pt reported that he has been taking a shower daily.  Pt is observed ambulating independently with walker and having meals in dayroom. Pt has demonstrated daily medication and treatment compliance with good effect. Pt is visible on the unit but isolative by himself. Pt has attended 15% of psych rehab groups. Pt is able to participate in groups discussion/activities with engagement.  Pt denied SI/HI/AH/VH at this time. Pt’s insight is limited; judgment is fair.    07-06-21  Type of therapy: Other,Physical therapy  Type of session: Individual  Level of patient participation: Attentive,Engaged,Participated with encouragement  --  Therapy conducted by: Other (specify),Physical therapy  Therapy Summary: Patient participated in gait training, balance training and graded therex in sitting.    07-10-21  Type of therapy: Other,Physical therapy  Type of session: Individual  Level of patient participation: Engaged,Participated with encouragement  --  Therapy conducted by: Other (specify),Physical therapy  Therapy Summary: Patient participated in gait training, standing balance training and B LE strengthening exercises.  
  05-17-21  Type of therapy: Other  Type of session: Individual  Level of patient participation: Resistance to participation  Duration of participation: Less than 15 minutes  Therapy conducted by: Psych rehab  Therapy Summary: Writer met pt to review progress towards his psych rehab goal. Pt was in his bed facing the wall without making eye contact with writer. Pt was minimally engaged and selectively responded to writer's questions with "yes" or "no". Pt denied SI/HI/AH/VH. Pt reported that his mood is "fine". Writer offered pt with individual activities, pt responded with "no". Pt got up and wanted to walk around but pt changed his mind once he got his walker. Pt went back to his bed. Overall, pt is on CO for disorganization. Pt requires assistance for ADLs. Pt has demonstrated medication compliance. Pt is isolative in his room. Pt does not attend any psych rehab groups over the past 7 days. Pt is in fair behavioral control. Pt's judgment and insight is impaired.  
  07-10-21  Type of therapy: Other,Physical therapy  Type of session: Individual  Level of patient participation: Engaged,Participated with encouragement  --  Therapy conducted by: Other (specify),Physical therapy  Therapy Summary: Patient participated in gait training, standing balance training and B LE strengthening exercises.    07-11-21  Type of therapy: Coping skills,Creative arts therapy,Leisure development,Music therapy,Peer advocate,Spirituality,Symptom management  Type of session: Group  Level of patient participation: Participated with encouragement  Duration of participation: 30 minutes  Therapy conducted by: Psych rehab  Therapy Summary: Writer met pt to review progress towards psychiatric rehabilitation goals over the past week. Writer met pt in dayroom while pt was watching TV. Pt was pleasant and cooperative upon the approach. Pt’s thought process was linear. Pt continues reporting that everything is good.  Pt told writer there will be a baseball game this afternoon and he is looking forward to watching it. Pt reported that he wants to improve his social skills in order to interact with peers more. Writer encouraged pt to continue attending psych rehab groups for socialization.  Pt reported that he took a shower in the morning. However, pt was observed dandruff on his hair.  Pt continues demonstrating improvement in ADLs. Pt is able to ambulate interpedently without a walker. Pt has demonstrated daily medication and treatment compliance with good effect. Pt is visible on the unit and interacting with selected peers. Pt has attended 60% of psych rehab groups. Pt is engaged in groups.  Pt denied SI/HI/AH/VH at this time. Pt’s insight is improving; judgment is fair. Regarding to pt psych rehab goal, Pt met his psych rehab goal as it relates to take care of ADLs without prompts. Therefore, pt’s psych rehab goal is updated to will attend groups to promote social skills development.  
  06-18-21  Type of therapy: Other,Physical therapy  Type of session: Individual  Level of patient participation: Engaged,Participated with encouragement  --  Therapy conducted by: Other (specify),Physical therapy  Therapy Summary: Patient engaged in graded therex in sitting and gait training using RW    06-20-21  Type of therapy: Creative arts therapy,Leisure development,Music therapy,Peer advocate  Type of session: Group  Level of patient participation: Quiet  Duration of participation: 15 minutes  Therapy conducted by: Psych rehab  Therapy Summary: Writer met pt  to review progress towards psychiatric rehabilitation goals over the past week. Writer met pt in dayroom while pt was watching TV. Pt was calm and cooperative upon the approach. Pt reported that he feels “good”, has been eating and sleeping well. Pt reported that he wants to go back home but he knows it is not the time because his father is hospitalized. Pt has demonstrated overall progress toward his psych rehab goal which is demonstrating the ability to care for self.  Pt's ADLs are improving with some assistance. Pt is observed ambulating independently with walker and having meals in dayroom. Pt has demonstrated daily medication and treatment compliance with good effect. Pt has been increasingly visible and social on the unit. Pt has attended 35% of psych rehab groups but minimally engaged in groups. Pt denied SI/HI/AH/VH at this time. Pt’s insight is limited; judgment is improved.    06-21-21  Type of therapy: Other,Physical therapy  Type of session: Individual  Level of patient participation: Engaged,Participated with encouragement  --  Therapy conducted by: Other (specify),Physical therapy  Therapy Summary: Patient participated in graded therex in sitting and gait training using RW.  
  21  Type of therapy: Leisure development,Peer advocate,Spirituality  Type of session: Group  Level of patient participation: Participated with encouragement,Quiet  Duration of participation: 15 minutes  --  Therapy Summary: Writer met pt to review progress towards psychiatric rehabilitation goals over the past week. Writer met pt in dayroom while pt was watching TV. Pt was calm and cooperative upon the approach. Pt reported that he feels “okay” even though his father  last week. Pt has been emotionally handling it well. Pt has demonstrated overall progress toward his psych rehab goal which is demonstrating the ability to care for self.  Pt is able to maintain ADLs more independently.  Pt is observed ambulating independently with walker and having meals in dayroom. Pt has demonstrated daily medication and treatment compliance with good effect. Pt is visible and social on the unit. Pt has attended 30% of psych rehab groups but minimally engaged in groups. Pt denied SI/HI/AH/VH at this time. Pt’s insight is limited; judgment is improved.    21  Type of therapy: Other,Physical therapy  Type of session: Individual  Level of patient participation: Engaged,Participated with encouragement  --  Therapy conducted by: Other (specify),Physical therapy  Therapy Summary: Patient participated in gait training without assistive device, standing balance training and graded therex in sitting.  
  06-06-21  Type of therapy: Creative arts therapy,Leisure development,Mindfulness,Music therapy,Symptom management  Type of session: Individual  Level of patient participation: Engaged  Duration of participation: Less than 15 minutes  Therapy conducted by: Psych rehab  Therapy Summary: Writer met with pt in order to discuss progress towards psychiatric rehabilitation goals over the past week to which pt has demonstrated progress. Writer met pt in his room. Pt was in his bed and presented cooperative and appropriate upon interview. Pt was verbal and receptive to writer’s engagement. Pt reported that he feels “well”, sleeping well and having meals. Pt continues to be discharge focused and states that he wants to go home to his parents. Pt is no longer on CO and has demonstrated overall improvements. As per nursing staff, pt's ADLs are improving with assistance.  Pt is able to feed himself and use walker to ambulate more often. Furthermore, pt has demonstrated daily medication and treatment compliance with good effect. Per staff, pt remains isolative by himself and attends psych rehab groups with encouragement but is minimally engaged at this time. Pt denied SI/HI/AH/VH at this time. Pt’s insight and judgment are limited.    06-07-21  Type of therapy: Other,Physical therapy  Type of session: Individual  Level of patient participation: Engaged,Participated with encouragement  --  Therapy conducted by: Other (specify),Physical therapy  Therapy Summary: Patient participated in ambulation from bedroom to day room and back to room. Patient also participated in graded therex in sitting.  
  05-24-21  Type of therapy: Psychoeducation,Other  Type of session: Individual  --  --  --  Therapy Summary: Writer met pt in his room. Pt was sitting in a geriatric chair. Pt was more verbal and receptive to writer’s engagement. Pt reported that he wants to go back home. Pt told writer that he lives with his parents and he has two siblings. Writer encouraged pt to continue engaging treatment which will facilitate discharge. Pt asked writer if he could use his phone to call his parents. Writer informed the pt there are unit phones that he can use. Pt was able to use walker with mental health worker’s assistance and ambulated to the phone area calling his parents. Writer checked in pt later after his phone call. Pt was sobbing and stated that he wants to go home. Writer provided pt with emotional support and gave pt headphones for music. Writer also encouraged pt to attend group for socialization and psychoeducation. Pt is isolative by himself and does not attend any psych rehab groups over the past week. As per treatment team, pt remains on CO for  disorganization. Pt is adhering to medications. Pt has demonstrated some improvement in ADLs. Pt is able to feed himself and use walker more independently to ambulate. Pt still requires assistance for toileting such as changing diaper. Pt denied SI/HI/AH/VH at this time.  
  05-31-21  Type of therapy: Other  Type of session: Individual  Level of patient participation: Participated with encouragement  Duration of participation: Less than 15 minutes  Therapy conducted by: Psych rehab  Therapy Summary: Writer met pt in his room. Pt was in his bed and not making eye contact with writer, but pt was verbal and receptive to writer’s engagement. Pt reported that he feels “okay”, sleeping well and having meals. Pt continues to be discharge focused and states that he wants to go home. Pt is not aware that he is in a psychiatric hospital when writer asked him where he is at. As per nursing staff, pt asked for newspaper. Writer informed pt that writer will bring the newspaper to him once it is delivered to the unit. Pt nodded his head “yes”. Overall, pt remains on CO for disorganization and fall risk. Pt is adhering with medications. Pt continues demonstrating improvement in ADLs. Pt is able to feed himself and use walker to ambulate more often. Pt still requires assistance for toileting. Pt remains isolative by himself and does not attend any psych rehab groups over the past week. Pt denied SI/HI/AH/VH at this time. Pt’s insight and judgment are limited.

## 2021-07-15 NOTE — BH TREATMENT PLAN - NSTXPROBSLFCRE_PSY_ALL_CORE
SELF-CARE DEFICIT

## 2021-07-15 NOTE — BH TREATMENT PLAN - NSTXSLFCREDATEEST_PSY_ALL_CORE
20-Jun-2021
11-May-2021
20-Jun-2021
11-May-2021
20-Jun-2021

## 2021-07-15 NOTE — BH TREATMENT PLAN - NSTXDCOPNODATETRGT_PSY_ALL_CORE
01-Jun-2021
25-May-2021
14-Jul-2021
14-Jul-2021
15-Yann-2021
14-Jul-2021
08-Jun-2021
14-Jul-2021
18-May-2021
21-Jul-2021

## 2021-07-15 NOTE — BH TREATMENT PLAN - NSTXPROBPSYCHO_PSY_ALL_CORE
PSYCHOTIC SYMPTOMS

## 2021-07-15 NOTE — BH INPATIENT PSYCHIATRY PROGRESS NOTE - NSBHASSESSSUMMFT_PSY_ALL_CORE
45M w/schizophrenia, admitted with catatonia and suspected psychosis/depression.      Catatonia resolved with no psychosis. Pt continues to have stable mood, sleep and appetite. ADLs improving. Pt has been accepted to [facility] and awaiting placement. Mother cannot care for him afterwards due to declining health and is looking for assisted living facility.     Plan:  - Continue meds Ativan 1 mg BID   - Await [facility] placement

## 2021-07-15 NOTE — BH TREATMENT PLAN - NSTXSUPORTINTERPR_PSY_ALL_CORE
Patient will engage in 1:1 interventions and psych rehab groups with psychiatric rehabilitation staff in order to facilitate progress towards his goal for sustained recovery by day 7.

## 2021-07-15 NOTE — BH TREATMENT PLAN - NSTXSLFCREINTERRN_PSY_ALL_CORE
Assess ADL needs. Establish a daily routine, provide shower supplies, and encourage to shower and shave, provide assistance as needed.
Encourage patient to perform ADLS daily and to come to staff if assistance is needed.
Assess pt for self care deficit, pt is able to adequately care for self, assessment ongoing.
Assess that pt is attending to ADLs
Assess that pt is attending to ADLs
Provide education regarding importance of performing ADL’s in maintaining good physical and mental health.
Encourage patient to perform ADLS independently. Provide asistance as needed.
Assess pt's ability self care. Pt has improved in self care abilities, assessement ongoing.
Assess ADL needs. Establish a daily routine, provide shower supplies, and encourage to shower and shave, provide assistance as needed.
Assess pt's ability self care. Pt has improved in self care abilities, assessement ongoing.

## 2021-07-16 LAB — SARS-COV-2 RNA SPEC QL NAA+PROBE: SIGNIFICANT CHANGE UP

## 2021-07-16 PROCEDURE — 99231 SBSQ HOSP IP/OBS SF/LOW 25: CPT

## 2021-07-16 RX ADMIN — Medication 1 MILLIGRAM(S): at 22:57

## 2021-07-16 RX ADMIN — AMLODIPINE BESYLATE 10 MILLIGRAM(S): 2.5 TABLET ORAL at 08:19

## 2021-07-16 RX ADMIN — RISPERIDONE 4 MILLIGRAM(S): 4 TABLET ORAL at 08:18

## 2021-07-16 RX ADMIN — Medication 1 MILLIGRAM(S): at 08:18

## 2021-07-16 RX ADMIN — MEMANTINE HYDROCHLORIDE 10 MILLIGRAM(S): 10 TABLET ORAL at 08:18

## 2021-07-16 RX ADMIN — MEMANTINE HYDROCHLORIDE 10 MILLIGRAM(S): 10 TABLET ORAL at 22:57

## 2021-07-16 RX ADMIN — SERTRALINE 100 MILLIGRAM(S): 25 TABLET, FILM COATED ORAL at 08:19

## 2021-07-16 NOTE — BH INPATIENT PSYCHIATRY PROGRESS NOTE - NSBHFUPINTERVALHXFT_PSY_A_CORE
Patient seen for follow up for psychosis, chart reviewed, and case discussed with treatment team.  No events reported overnight.  The patient continues to maintain gains on the unit.  He states his mood is good, and denies any psychotic symptoms.  He remains visible on the unit, attending some groups.  The patient reports eating and sleeping well.  He has been compliant with medications, tolerating them well.

## 2021-07-16 NOTE — BH INPATIENT PSYCHIATRY PROGRESS NOTE - NSBHASSESSSUMMFT_PSY_ALL_CORE
45M w/schizophrenia, admitted with catatonia and suspected psychosis/depression.      Catatonia resolved with no psychosis. Pt continues to have stable mood, sleep and appetite. ADLs improving. Pt has been accepted to [facility] and awaiting placement. Mother cannot care for him afterwards due to declining health and is looking for assisted living facility.     Plan:  - Continue current medications  - Await [facility] placement - applications sent     45M w/schizophrenia, admitted with catatonia and suspected psychosis/depression.      Catatonia resolved with no psychosis. Pt continues to have stable mood, sleep and appetite. ADLs improving.  Pt has been accepted for [facility] and awaiting placement.  No contraindications for subacute rehab - patient is cleared and stable psychiatrically.     Plan:  - Continue current medications  - Await [facility] placement - applications sent     45M w/schizophrenia, admitted with catatonia and suspected psychosis/depression.      Catatonia resolved with no psychosis. Pt continues to have stable mood, sleep and appetite. ADLs improving.  Pt has been accepted for [facility] and awaiting placement.  No contraindications to discharge /cleared from psych point of view.     Plan:  - Continue current medications  - Await [facility] placement - applications sent

## 2021-07-17 RX ADMIN — SERTRALINE 100 MILLIGRAM(S): 25 TABLET, FILM COATED ORAL at 08:12

## 2021-07-17 RX ADMIN — Medication 1 MILLIGRAM(S): at 20:27

## 2021-07-17 RX ADMIN — MEMANTINE HYDROCHLORIDE 10 MILLIGRAM(S): 10 TABLET ORAL at 21:00

## 2021-07-17 RX ADMIN — AMLODIPINE BESYLATE 10 MILLIGRAM(S): 2.5 TABLET ORAL at 08:12

## 2021-07-17 RX ADMIN — Medication 1 MILLIGRAM(S): at 08:12

## 2021-07-17 RX ADMIN — RISPERIDONE 4 MILLIGRAM(S): 4 TABLET ORAL at 08:12

## 2021-07-17 RX ADMIN — MEMANTINE HYDROCHLORIDE 10 MILLIGRAM(S): 10 TABLET ORAL at 08:12

## 2021-07-18 RX ADMIN — MEMANTINE HYDROCHLORIDE 10 MILLIGRAM(S): 10 TABLET ORAL at 08:47

## 2021-07-18 RX ADMIN — MEMANTINE HYDROCHLORIDE 10 MILLIGRAM(S): 10 TABLET ORAL at 20:08

## 2021-07-18 RX ADMIN — Medication 1 MILLIGRAM(S): at 08:47

## 2021-07-18 RX ADMIN — SERTRALINE 100 MILLIGRAM(S): 25 TABLET, FILM COATED ORAL at 08:47

## 2021-07-18 RX ADMIN — AMLODIPINE BESYLATE 10 MILLIGRAM(S): 2.5 TABLET ORAL at 08:47

## 2021-07-18 RX ADMIN — RISPERIDONE 4 MILLIGRAM(S): 4 TABLET ORAL at 08:47

## 2021-07-18 RX ADMIN — Medication 1 MILLIGRAM(S): at 20:08

## 2021-07-19 VITALS — TEMPERATURE: 98 F

## 2021-07-19 PROCEDURE — 99238 HOSP IP/OBS DSCHRG MGMT 30/<: CPT

## 2021-07-19 RX ORDER — RISPERIDONE 4 MG/1
1 TABLET ORAL
Qty: 0 | Refills: 0 | DISCHARGE
Start: 2021-07-19

## 2021-07-19 RX ORDER — MEMANTINE HYDROCHLORIDE 10 MG/1
1 TABLET ORAL
Qty: 0 | Refills: 0 | DISCHARGE
Start: 2021-07-19

## 2021-07-19 RX ORDER — AMLODIPINE BESYLATE 2.5 MG/1
1 TABLET ORAL
Qty: 0 | Refills: 0 | DISCHARGE
Start: 2021-07-19

## 2021-07-19 RX ORDER — SERTRALINE 25 MG/1
1 TABLET, FILM COATED ORAL
Qty: 0 | Refills: 0 | DISCHARGE
Start: 2021-07-19

## 2021-07-19 RX ADMIN — Medication 1 MILLIGRAM(S): at 08:37

## 2021-07-19 RX ADMIN — MEMANTINE HYDROCHLORIDE 10 MILLIGRAM(S): 10 TABLET ORAL at 08:38

## 2021-07-19 RX ADMIN — SERTRALINE 100 MILLIGRAM(S): 25 TABLET, FILM COATED ORAL at 08:38

## 2021-07-19 RX ADMIN — AMLODIPINE BESYLATE 10 MILLIGRAM(S): 2.5 TABLET ORAL at 08:38

## 2021-07-19 RX ADMIN — RISPERIDONE 4 MILLIGRAM(S): 4 TABLET ORAL at 08:37

## 2021-07-19 NOTE — BH INPATIENT PSYCHIATRY PROGRESS NOTE - NSTXPROBPSYCHO_PSY_ALL_CORE
PSYCHOTIC SYMPTOMS

## 2021-07-19 NOTE — BH INPATIENT PSYCHIATRY PROGRESS NOTE - NSBHPROGSUIC_PSY_A_CORE
no

## 2021-07-19 NOTE — [facility] INPATIENT PSYCHIATRY PROGRESS NOTE - NSBHMETABOLIC_PSY_ALL_CORE_FT
BMI: BMI (kg/m2): 20.2 (05-15-21 @ 10:37)  HbA1c: A1C with Estimated Average Glucose Result: 5.4 % (05-06-21 @ 04:14)    Glucose: POCT Blood Glucose.: 134 mg/dL (05-05-21 @ 10:04)    BP: --  Lipid Panel: Date/Time: 05-06-21 @ 04:14  Cholesterol, Serum: 151  Direct LDL: --  HDL Cholesterol, Serum: 59  Total Cholesterol/HDL Ration Measurement: --  Triglycerides, Serum: 81  
BMI: BMI (kg/m2): 20.2 (05-15-21 @ 10:37)  HbA1c: A1C with Estimated Average Glucose Result: 5.4 % (05-06-21 @ 04:14)    Glucose: POCT Blood Glucose.: 82 mg/dL (06-06-21 @ 08:07)    BP: 135/83 (06-30-21 @ 08:10) (135/83 - 135/83)  Lipid Panel: Date/Time: 05-06-21 @ 04:14  Cholesterol, Serum: 151  Direct LDL: --  HDL Cholesterol, Serum: 59  Total Cholesterol/HDL Ration Measurement: --  Triglycerides, Serum: 81  
BMI: BMI (kg/m2): 20.2 (05-15-21 @ 10:37)  HbA1c: A1C with Estimated Average Glucose Result: 5.4 % (05-06-21 @ 04:14)    Glucose: POCT Blood Glucose.: 82 mg/dL (06-06-21 @ 08:07)    BP: 156/90 (06-26-21 @ 08:29) (156/90 - 156/90)  Lipid Panel: Date/Time: 05-06-21 @ 04:14  Cholesterol, Serum: 151  Direct LDL: --  HDL Cholesterol, Serum: 59  Total Cholesterol/HDL Ration Measurement: --  Triglycerides, Serum: 81  
BMI: BMI (kg/m2): 20.2 (05-15-21 @ 10:37)  HbA1c: A1C with Estimated Average Glucose Result: 5.4 % (05-06-21 @ 04:14)    Glucose: POCT Blood Glucose.: 82 mg/dL (06-06-21 @ 08:07)    BP: 150/86 (06-22-21 @ 08:18) (141/86 - 150/86)  Lipid Panel: Date/Time: 05-06-21 @ 04:14  Cholesterol, Serum: 151  Direct LDL: --  HDL Cholesterol, Serum: 59  Total Cholesterol/HDL Ration Measurement: --  Triglycerides, Serum: 81  
BMI: BMI (kg/m2): 20.2 (05-15-21 @ 10:37)  HbA1c: A1C with Estimated Average Glucose Result: 5.4 % (05-06-21 @ 04:14)    Glucose: POCT Blood Glucose.: 134 mg/dL (05-05-21 @ 10:04)    BP: 135/86 (05-23-21 @ 14:53) (135/86 - 135/86)  Lipid Panel: Date/Time: 05-06-21 @ 04:14  Cholesterol, Serum: 151  Direct LDL: --  HDL Cholesterol, Serum: 59  Total Cholesterol/HDL Ration Measurement: --  Triglycerides, Serum: 81  
BMI: BMI (kg/m2): 20.2 (05-15-21 @ 10:37)  HbA1c: A1C with Estimated Average Glucose Result: 5.4 % (05-06-21 @ 04:14)    Glucose: POCT Blood Glucose.: 134 mg/dL (05-05-21 @ 10:04)    BP: 142/87 (06-03-21 @ 08:30) (142/87 - 142/87)  Lipid Panel: Date/Time: 05-06-21 @ 04:14  Cholesterol, Serum: 151  Direct LDL: --  HDL Cholesterol, Serum: 59  Total Cholesterol/HDL Ration Measurement: --  Triglycerides, Serum: 81  
BMI: BMI (kg/m2): 20.2 (05-15-21 @ 10:37)  HbA1c: A1C with Estimated Average Glucose Result: 5.4 % (05-06-21 @ 04:14)    Glucose: POCT Blood Glucose.: 134 mg/dL (05-05-21 @ 10:04)    BP: --  Lipid Panel: Date/Time: 05-06-21 @ 04:14  Cholesterol, Serum: 151  Direct LDL: --  HDL Cholesterol, Serum: 59  Total Cholesterol/HDL Ration Measurement: --  Triglycerides, Serum: 81  
BMI: BMI (kg/m2): 20.2 (05-15-21 @ 10:37)  HbA1c: A1C with Estimated Average Glucose Result: 5.4 % (05-06-21 @ 04:14)    Glucose: POCT Blood Glucose.: 134 mg/dL (05-05-21 @ 10:04)    BP: 145/80 (05-31-21 @ 08:22) (139/85 - 150/80)  Lipid Panel: Date/Time: 05-06-21 @ 04:14  Cholesterol, Serum: 151  Direct LDL: --  HDL Cholesterol, Serum: 59  Total Cholesterol/HDL Ration Measurement: --  Triglycerides, Serum: 81  
BMI: BMI (kg/m2): 20.2 (05-15-21 @ 10:37)  HbA1c: A1C with Estimated Average Glucose Result: 5.4 % (05-06-21 @ 04:14)    Glucose: POCT Blood Glucose.: 134 mg/dL (05-05-21 @ 10:04)    BP: 150/80 (05-29-21 @ 08:13) (150/80 - 150/96)  Lipid Panel: Date/Time: 05-06-21 @ 04:14  Cholesterol, Serum: 151  Direct LDL: --  HDL Cholesterol, Serum: 59  Total Cholesterol/HDL Ration Measurement: --  Triglycerides, Serum: 81  
BMI: BMI (kg/m2): 20.2 (05-15-21 @ 10:37)  HbA1c: A1C with Estimated Average Glucose Result: 5.4 % (05-06-21 @ 04:14)    Glucose: POCT Blood Glucose.: 82 mg/dL (06-06-21 @ 08:07)    BP: 147/88 (06-25-21 @ 08:05) (122/71 - 147/88)  Lipid Panel: Date/Time: 05-06-21 @ 04:14  Cholesterol, Serum: 151  Direct LDL: --  HDL Cholesterol, Serum: 59  Total Cholesterol/HDL Ration Measurement: --  Triglycerides, Serum: 81  
BMI: BMI (kg/m2): 22.4 (07-10-21 @ 08:05)  HbA1c: A1C with Estimated Average Glucose Result: 5.4 % (05-06-21 @ 04:14)    Glucose: POCT Blood Glucose.: 82 mg/dL (06-06-21 @ 08:07)    BP: 155/95 (07-18-21 @ 08:45) (155/95 - 163/85)  Lipid Panel: Date/Time: 05-06-21 @ 04:14  Cholesterol, Serum: 151  Direct LDL: --  HDL Cholesterol, Serum: 59  Total Cholesterol/HDL Ration Measurement: --  Triglycerides, Serum: 81  
BMI: BMI (kg/m2): 20.2 (05-15-21 @ 10:37)  HbA1c: A1C with Estimated Average Glucose Result: 5.4 % (05-06-21 @ 04:14)    Glucose: POCT Blood Glucose.: 134 mg/dL (05-05-21 @ 10:04)    BP: 135/86 (05-23-21 @ 14:53) (135/86 - 135/86)  Lipid Panel: Date/Time: 05-06-21 @ 04:14  Cholesterol, Serum: 151  Direct LDL: --  HDL Cholesterol, Serum: 59  Total Cholesterol/HDL Ration Measurement: --  Triglycerides, Serum: 81  
BMI: BMI (kg/m2): 20.2 (05-15-21 @ 10:37)  HbA1c: A1C with Estimated Average Glucose Result: 5.4 % (05-06-21 @ 04:14)    Glucose: POCT Blood Glucose.: 134 mg/dL (05-05-21 @ 10:04)    BP: 150/88 (05-27-21 @ 08:15) (150/88 - 150/88)  Lipid Panel: Date/Time: 05-06-21 @ 04:14  Cholesterol, Serum: 151  Direct LDL: --  HDL Cholesterol, Serum: 59  Total Cholesterol/HDL Ration Measurement: --  Triglycerides, Serum: 81  
BMI: BMI (kg/m2): 20.2 (05-15-21 @ 10:37)  HbA1c: A1C with Estimated Average Glucose Result: 5.4 % (05-06-21 @ 04:14)    Glucose: POCT Blood Glucose.: 82 mg/dL (06-06-21 @ 08:07)    BP: 122/71 (06-23-21 @ 09:08) (122/71 - 150/86)  Lipid Panel: Date/Time: 05-06-21 @ 04:14  Cholesterol, Serum: 151  Direct LDL: --  HDL Cholesterol, Serum: 59  Total Cholesterol/HDL Ration Measurement: --  Triglycerides, Serum: 81  
BMI: BMI (kg/m2): 20.2 (05-15-21 @ 10:37)  HbA1c: A1C with Estimated Average Glucose Result: 5.4 % (05-06-21 @ 04:14)    Glucose: POCT Blood Glucose.: 134 mg/dL (05-05-21 @ 10:04)    BP: --  Lipid Panel: Date/Time: 05-06-21 @ 04:14  Cholesterol, Serum: 151  Direct LDL: --  HDL Cholesterol, Serum: 59  Total Cholesterol/HDL Ration Measurement: --  Triglycerides, Serum: 81  
BMI: BMI (kg/m2): 20.2 (05-15-21 @ 10:37)  HbA1c: A1C with Estimated Average Glucose Result: 5.4 % (05-06-21 @ 04:14)    Glucose: POCT Blood Glucose.: 82 mg/dL (06-06-21 @ 08:07)    BP: 135/85 (06-11-21 @ 07:48) (100/70 - 135/85)  Lipid Panel: Date/Time: 05-06-21 @ 04:14  Cholesterol, Serum: 151  Direct LDL: --  HDL Cholesterol, Serum: 59  Total Cholesterol/HDL Ration Measurement: --  Triglycerides, Serum: 81  
BMI: BMI (kg/m2): 20.2 (05-15-21 @ 10:37)  HbA1c: A1C with Estimated Average Glucose Result: 5.4 % (05-06-21 @ 04:14)  Glucose: POCT Blood Glucose.: 134 mg/dL (05-05-21 @ 10:04)  BP: 142/87 (05-16-21 @ 09:30) (142/87 - 150/95)  Lipid Panel: Date/Time: 05-06-21 @ 04:14  Cholesterol, Serum: 151  Direct LDL: --  HDL Cholesterol, Serum: 59  Total Cholesterol/HDL Ration Measurement: --  Triglycerides, Serum: 81  
BMI: BMI (kg/m2): 22.4 (07-10-21 @ 08:05)  HbA1c: A1C with Estimated Average Glucose Result: 5.4 % (05-06-21 @ 04:14)    Glucose: POCT Blood Glucose.: 82 mg/dL (06-06-21 @ 08:07)    BP: 149/77 (07-14-21 @ 07:00) (149/77 - 149/77)  Lipid Panel: Date/Time: 05-06-21 @ 04:14  Cholesterol, Serum: 151  Direct LDL: --  HDL Cholesterol, Serum: 59  Total Cholesterol/HDL Ration Measurement: --  Triglycerides, Serum: 81  
BMI: BMI (kg/m2): 20.2 (05-15-21 @ 10:37)  HbA1c: A1C with Estimated Average Glucose Result: 5.4 % (05-06-21 @ 04:14)    Glucose: POCT Blood Glucose.: 82 mg/dL (06-06-21 @ 08:07)    BP: --  Lipid Panel: Date/Time: 05-06-21 @ 04:14  Cholesterol, Serum: 151  Direct LDL: --  HDL Cholesterol, Serum: 59  Total Cholesterol/HDL Ration Measurement: --  Triglycerides, Serum: 81  
BMI: BMI (kg/m2): 20.2 (05-15-21 @ 10:37)  HbA1c: A1C with Estimated Average Glucose Result: 5.4 % (05-06-21 @ 04:14)    Glucose: POCT Blood Glucose.: 82 mg/dL (06-06-21 @ 08:07)    BP: 146/96 (07-08-21 @ 08:18) (146/96 - 146/96)  Lipid Panel: Date/Time: 05-06-21 @ 04:14  Cholesterol, Serum: 151  Direct LDL: --  HDL Cholesterol, Serum: 59  Total Cholesterol/HDL Ration Measurement: --  Triglycerides, Serum: 81  
BMI: BMI (kg/m2): 20.9 (05-05-21 @ 01:25)  HbA1c: A1C with Estimated Average Glucose Result: 5.4 % (05-06-21 @ 04:14)    Glucose: POCT Blood Glucose.: 134 mg/dL (05-05-21 @ 10:04)    BP: 130/85 (05-13-21 @ 10:00) (130/85 - 130/85)  Lipid Panel: Date/Time: 05-06-21 @ 04:14  Cholesterol, Serum: 151  Direct LDL: --  HDL Cholesterol, Serum: 59  Total Cholesterol/HDL Ration Measurement: --  Triglycerides, Serum: 81  
BMI: BMI (kg/m2): 20.2 (05-15-21 @ 10:37)  HbA1c: A1C with Estimated Average Glucose Result: 5.4 % (05-06-21 @ 04:14)    Glucose: POCT Blood Glucose.: 82 mg/dL (06-06-21 @ 08:07)    BP: 122/71 (06-23-21 @ 09:08) (122/71 - 150/86)  Lipid Panel: Date/Time: 05-06-21 @ 04:14  Cholesterol, Serum: 151  Direct LDL: --  HDL Cholesterol, Serum: 59  Total Cholesterol/HDL Ration Measurement: --  Triglycerides, Serum: 81  
BMI: BMI (kg/m2): 20.2 (05-15-21 @ 10:37)  HbA1c: A1C with Estimated Average Glucose Result: 5.4 % (05-06-21 @ 04:14)    Glucose: POCT Blood Glucose.: 82 mg/dL (06-06-21 @ 08:07)    BP: 141/86 (06-21-21 @ 08:08) (141/86 - 144/87)  Lipid Panel: Date/Time: 05-06-21 @ 04:14  Cholesterol, Serum: 151  Direct LDL: --  HDL Cholesterol, Serum: 59  Total Cholesterol/HDL Ration Measurement: --  Triglycerides, Serum: 81  
BMI: BMI (kg/m2): 20.9 (05-05-21 @ 01:25)  HbA1c: A1C with Estimated Average Glucose Result: 5.4 % (05-06-21 @ 04:14)    Glucose: POCT Blood Glucose.: 134 mg/dL (05-05-21 @ 10:04)    BP: --  Lipid Panel: Date/Time: 05-06-21 @ 04:14  Cholesterol, Serum: 151  Direct LDL: --  HDL Cholesterol, Serum: 59  Total Cholesterol/HDL Ration Measurement: --  Triglycerides, Serum: 81  
BMI: BMI (kg/m2): 20.2 (05-15-21 @ 10:37)  HbA1c: A1C with Estimated Average Glucose Result: 5.4 % (05-06-21 @ 04:14)    Glucose: POCT Blood Glucose.: 134 mg/dL (05-05-21 @ 10:04)    BP: 145/80 (05-31-21 @ 08:22) (139/85 - 145/80)  Lipid Panel: Date/Time: 05-06-21 @ 04:14  Cholesterol, Serum: 151  Direct LDL: --  HDL Cholesterol, Serum: 59  Total Cholesterol/HDL Ration Measurement: --  Triglycerides, Serum: 81  
BMI: BMI (kg/m2): 20.9 (05-05-21 @ 01:25)  HbA1c: A1C with Estimated Average Glucose Result: 5.4 % (05-06-21 @ 04:14)    Glucose: POCT Blood Glucose.: 134 mg/dL (05-05-21 @ 10:04)    BP: --  Lipid Panel: Date/Time: 05-06-21 @ 04:14  Cholesterol, Serum: 151  Direct LDL: --  HDL Cholesterol, Serum: 59  Total Cholesterol/HDL Ration Measurement: --  Triglycerides, Serum: 81  
BMI: BMI (kg/m2): 20.2 (05-15-21 @ 10:37)  HbA1c: A1C with Estimated Average Glucose Result: 5.4 % (05-06-21 @ 04:14)    Glucose: POCT Blood Glucose.: 82 mg/dL (06-06-21 @ 08:07)    BP: --  Lipid Panel: Date/Time: 05-06-21 @ 04:14  Cholesterol, Serum: 151  Direct LDL: --  HDL Cholesterol, Serum: 59  Total Cholesterol/HDL Ration Measurement: --  Triglycerides, Serum: 81  
BMI: BMI (kg/m2): 22.4 (07-10-21 @ 08:05)  HbA1c: A1C with Estimated Average Glucose Result: 5.4 % (05-06-21 @ 04:14)    Glucose: POCT Blood Glucose.: 82 mg/dL (06-06-21 @ 08:07)    BP: 146/79 (07-15-21 @ 08:02) (146/79 - 149/77)  Lipid Panel: Date/Time: 05-06-21 @ 04:14  Cholesterol, Serum: 151  Direct LDL: --  HDL Cholesterol, Serum: 59  Total Cholesterol/HDL Ration Measurement: --  Triglycerides, Serum: 81  
BMI: BMI (kg/m2): 20.2 (05-15-21 @ 10:37)  HbA1c: A1C with Estimated Average Glucose Result: 5.4 % (05-06-21 @ 04:14)    Glucose: POCT Blood Glucose.: 82 mg/dL (06-06-21 @ 08:07)    BP: 143/90 (06-18-21 @ 08:12) (143/90 - 143/90)  Lipid Panel: Date/Time: 05-06-21 @ 04:14  Cholesterol, Serum: 151  Direct LDL: --  HDL Cholesterol, Serum: 59  Total Cholesterol/HDL Ration Measurement: --  Triglycerides, Serum: 81  
BMI: BMI (kg/m2): 20.2 (05-15-21 @ 10:37)  HbA1c: A1C with Estimated Average Glucose Result: 5.4 % (05-06-21 @ 04:14)    Glucose: POCT Blood Glucose.: 134 mg/dL (05-05-21 @ 10:04)    BP: --  Lipid Panel: Date/Time: 05-06-21 @ 04:14  Cholesterol, Serum: 151  Direct LDL: --  HDL Cholesterol, Serum: 59  Total Cholesterol/HDL Ration Measurement: --  Triglycerides, Serum: 81  
BMI: BMI (kg/m2): 20.2 (05-15-21 @ 10:37)  HbA1c: A1C with Estimated Average Glucose Result: 5.4 % (05-06-21 @ 04:14)    Glucose: POCT Blood Glucose.: 134 mg/dL (05-05-21 @ 10:04)    BP: --  Lipid Panel: Date/Time: 05-06-21 @ 04:14  Cholesterol, Serum: 151  Direct LDL: --  HDL Cholesterol, Serum: 59  Total Cholesterol/HDL Ration Measurement: --  Triglycerides, Serum: 81  
BMI: BMI (kg/m2): 20.2 (05-15-21 @ 10:37)  HbA1c: A1C with Estimated Average Glucose Result: 5.4 % (05-06-21 @ 04:14)  Glucose: POCT Blood Glucose.: 134 mg/dL (05-05-21 @ 10:04)  BP: 130/85 (05-13-21 @ 10:00) (130/85 - 130/85)  Lipid Panel: Date/Time: 05-06-21 @ 04:14  Cholesterol, Serum: 151  Direct LDL: --  HDL Cholesterol, Serum: 59  Total Cholesterol/HDL Ration Measurement: --  Triglycerides, Serum: 81  
BMI: BMI (kg/m2): 20.2 (05-15-21 @ 10:37)  HbA1c: A1C with Estimated Average Glucose Result: 5.4 % (05-06-21 @ 04:14)    Glucose: POCT Blood Glucose.: 82 mg/dL (06-06-21 @ 08:07)    BP: 135/83 (06-30-21 @ 08:10) (135/83 - 135/83)  Lipid Panel: Date/Time: 05-06-21 @ 04:14  Cholesterol, Serum: 151  Direct LDL: --  HDL Cholesterol, Serum: 59  Total Cholesterol/HDL Ration Measurement: --  Triglycerides, Serum: 81  
BMI: BMI (kg/m2): 20.9 (05-05-21 @ 01:25)  HbA1c: A1C with Estimated Average Glucose Result: 5.4 % (05-06-21 @ 04:14)    Glucose: POCT Blood Glucose.: 134 mg/dL (05-05-21 @ 10:04)    BP: 130/85 (05-13-21 @ 10:00) (130/85 - 130/85)  Lipid Panel: Date/Time: 05-06-21 @ 04:14  Cholesterol, Serum: 151  Direct LDL: --  HDL Cholesterol, Serum: 59  Total Cholesterol/HDL Ration Measurement: --  Triglycerides, Serum: 81  
BMI: BMI (kg/m2): 20.2 (05-15-21 @ 10:37)  HbA1c: A1C with Estimated Average Glucose Result: 5.4 % (05-06-21 @ 04:14)    Glucose: POCT Blood Glucose.: 134 mg/dL (05-05-21 @ 10:04)    BP: 142/87 (05-16-21 @ 09:30) (142/87 - 150/95)  Lipid Panel: Date/Time: 05-06-21 @ 04:14  Cholesterol, Serum: 151  Direct LDL: --  HDL Cholesterol, Serum: 59  Total Cholesterol/HDL Ration Measurement: --  Triglycerides, Serum: 81  
BMI: BMI (kg/m2): 20.2 (05-15-21 @ 10:37)  HbA1c: A1C with Estimated Average Glucose Result: 5.4 % (05-06-21 @ 04:14)    Glucose: POCT Blood Glucose.: 134 mg/dL (05-05-21 @ 10:04)    BP: 142/87 (05-16-21 @ 09:30) (142/87 - 150/95)  Lipid Panel: Date/Time: 05-06-21 @ 04:14  Cholesterol, Serum: 151  Direct LDL: --  HDL Cholesterol, Serum: 59  Total Cholesterol/HDL Ration Measurement: --  Triglycerides, Serum: 81  
BMI: BMI (kg/m2): 20.2 (05-15-21 @ 10:37)  HbA1c: A1C with Estimated Average Glucose Result: 5.4 % (05-06-21 @ 04:14)    Glucose: POCT Blood Glucose.: 82 mg/dL (06-06-21 @ 08:07)    BP: --  Lipid Panel: Date/Time: 05-06-21 @ 04:14  Cholesterol, Serum: 151  Direct LDL: --  HDL Cholesterol, Serum: 59  Total Cholesterol/HDL Ration Measurement: --  Triglycerides, Serum: 81  
BMI: BMI (kg/m2): 20.2 (05-15-21 @ 10:37)  HbA1c: A1C with Estimated Average Glucose Result: 5.4 % (05-06-21 @ 04:14)    Glucose: POCT Blood Glucose.: 82 mg/dL (06-06-21 @ 08:07)    BP: 148/88 (07-05-21 @ 05:37) (148/88 - 148/88)  Lipid Panel: Date/Time: 05-06-21 @ 04:14  Cholesterol, Serum: 151  Direct LDL: --  HDL Cholesterol, Serum: 59  Total Cholesterol/HDL Ration Measurement: --  Triglycerides, Serum: 81  
BMI: BMI (kg/m2): 20.2 (05-15-21 @ 10:37)  HbA1c: A1C with Estimated Average Glucose Result: 5.4 % (05-06-21 @ 04:14)    Glucose: POCT Blood Glucose.: 134 mg/dL (05-05-21 @ 10:04)    BP: 135/86 (05-23-21 @ 14:53) (135/86 - 135/86)  Lipid Panel: Date/Time: 05-06-21 @ 04:14  Cholesterol, Serum: 151  Direct LDL: --  HDL Cholesterol, Serum: 59  Total Cholesterol/HDL Ration Measurement: --  Triglycerides, Serum: 81  
BMI: BMI (kg/m2): 20.2 (05-15-21 @ 10:37)  HbA1c: A1C with Estimated Average Glucose Result: 5.4 % (05-06-21 @ 04:14)    Glucose: POCT Blood Glucose.: 134 mg/dL (05-05-21 @ 10:04)    BP: 150/96 (05-28-21 @ 08:19) (150/88 - 150/96)  Lipid Panel: Date/Time: 05-06-21 @ 04:14  Cholesterol, Serum: 151  Direct LDL: --  HDL Cholesterol, Serum: 59  Total Cholesterol/HDL Ration Measurement: --  Triglycerides, Serum: 81  
BMI: BMI (kg/m2): 20.2 (05-15-21 @ 10:37)  HbA1c: A1C with Estimated Average Glucose Result: 5.4 % (05-06-21 @ 04:14)    Glucose: POCT Blood Glucose.: 82 mg/dL (06-06-21 @ 08:07)    BP: 148/88 (07-05-21 @ 05:37) (148/88 - 148/88)  Lipid Panel: Date/Time: 05-06-21 @ 04:14  Cholesterol, Serum: 151  Direct LDL: --  HDL Cholesterol, Serum: 59  Total Cholesterol/HDL Ration Measurement: --  Triglycerides, Serum: 81  
BMI: BMI (kg/m2): 20.2 (05-15-21 @ 10:37)  HbA1c: A1C with Estimated Average Glucose Result: 5.4 % (05-06-21 @ 04:14)    Glucose: POCT Blood Glucose.: 134 mg/dL (05-05-21 @ 10:04)    BP: 139/85 (05-30-21 @ 08:30) (139/85 - 150/96)  Lipid Panel: Date/Time: 05-06-21 @ 04:14  Cholesterol, Serum: 151  Direct LDL: --  HDL Cholesterol, Serum: 59  Total Cholesterol/HDL Ration Measurement: --  Triglycerides, Serum: 81  
BMI: BMI (kg/m2): 20.2 (05-15-21 @ 10:37)  HbA1c: A1C with Estimated Average Glucose Result: 5.4 % (05-06-21 @ 04:14)    Glucose: POCT Blood Glucose.: 82 mg/dL (06-06-21 @ 08:07)    BP: 150/90 (06-06-21 @ 08:36) (150/90 - 150/90)  Lipid Panel: Date/Time: 05-06-21 @ 04:14  Cholesterol, Serum: 151  Direct LDL: --  HDL Cholesterol, Serum: 59  Total Cholesterol/HDL Ration Measurement: --  Triglycerides, Serum: 81  
BMI: BMI (kg/m2): 20.2 (05-15-21 @ 10:37)  HbA1c: A1C with Estimated Average Glucose Result: 5.4 % (05-06-21 @ 04:14)    Glucose: POCT Blood Glucose.: 134 mg/dL (05-05-21 @ 10:04)    BP: 145/80 (05-31-21 @ 08:22) (145/80 - 145/80)  Lipid Panel: Date/Time: 05-06-21 @ 04:14  Cholesterol, Serum: 151  Direct LDL: --  HDL Cholesterol, Serum: 59  Total Cholesterol/HDL Ration Measurement: --  Triglycerides, Serum: 81  
BMI: BMI (kg/m2): 20.2 (05-15-21 @ 10:37)  HbA1c: A1C with Estimated Average Glucose Result: 5.4 % (05-06-21 @ 04:14)    Glucose: POCT Blood Glucose.: 134 mg/dL (05-05-21 @ 10:04)    BP: 142/87 (06-03-21 @ 08:30) (142/87 - 142/87)  Lipid Panel: Date/Time: 05-06-21 @ 04:14  Cholesterol, Serum: 151  Direct LDL: --  HDL Cholesterol, Serum: 59  Total Cholesterol/HDL Ration Measurement: --  Triglycerides, Serum: 81  
BMI: BMI (kg/m2): 20.2 (05-15-21 @ 10:37)  HbA1c: A1C with Estimated Average Glucose Result: 5.4 % (05-06-21 @ 04:14)    Glucose: POCT Blood Glucose.: 82 mg/dL (06-06-21 @ 08:07)    BP: --  Lipid Panel: Date/Time: 05-06-21 @ 04:14  Cholesterol, Serum: 151  Direct LDL: --  HDL Cholesterol, Serum: 59  Total Cholesterol/HDL Ration Measurement: --  Triglycerides, Serum: 81  
BMI: BMI (kg/m2): 20.2 (05-15-21 @ 10:37)  HbA1c: A1C with Estimated Average Glucose Result: 5.4 % (05-06-21 @ 04:14)    Glucose: POCT Blood Glucose.: 82 mg/dL (06-06-21 @ 08:07)    BP: 138/92 (06-13-21 @ 07:45) (138/92 - 138/92)  Lipid Panel: Date/Time: 05-06-21 @ 04:14  Cholesterol, Serum: 151  Direct LDL: --  HDL Cholesterol, Serum: 59  Total Cholesterol/HDL Ration Measurement: --  Triglycerides, Serum: 81  
BMI: BMI (kg/m2): 22.4 (07-10-21 @ 08:05)  HbA1c: A1C with Estimated Average Glucose Result: 5.4 % (05-06-21 @ 04:14)    Glucose: POCT Blood Glucose.: 82 mg/dL (06-06-21 @ 08:07)    BP: 152/89 (07-10-21 @ 08:05) (152/89 - 152/89)  Lipid Panel: Date/Time: 05-06-21 @ 04:14  Cholesterol, Serum: 151  Direct LDL: --  HDL Cholesterol, Serum: 59  Total Cholesterol/HDL Ration Measurement: --  Triglycerides, Serum: 81  
BMI: BMI (kg/m2): 20.2 (05-15-21 @ 10:37)  HbA1c: A1C with Estimated Average Glucose Result: 5.4 % (05-06-21 @ 04:14)    Glucose: POCT Blood Glucose.: 82 mg/dL (06-06-21 @ 08:07)    BP: 100/70 (06-10-21 @ 08:34) (100/70 - 100/70)  Lipid Panel: Date/Time: 05-06-21 @ 04:14  Cholesterol, Serum: 151  Direct LDL: --  HDL Cholesterol, Serum: 59  Total Cholesterol/HDL Ration Measurement: --  Triglycerides, Serum: 81  
BMI: BMI (kg/m2): 20.2 (05-15-21 @ 10:37)  HbA1c: A1C with Estimated Average Glucose Result: 5.4 % (05-06-21 @ 04:14)    Glucose: POCT Blood Glucose.: 82 mg/dL (06-06-21 @ 08:07)    BP: 146/96 (07-08-21 @ 08:18) (146/96 - 146/96)  Lipid Panel: Date/Time: 05-06-21 @ 04:14  Cholesterol, Serum: 151  Direct LDL: --  HDL Cholesterol, Serum: 59  Total Cholesterol/HDL Ration Measurement: --  Triglycerides, Serum: 81  
BMI: BMI (kg/m2): 22.4 (07-10-21 @ 08:05)  HbA1c: A1C with Estimated Average Glucose Result: 5.4 % (05-06-21 @ 04:14)    Glucose: POCT Blood Glucose.: 82 mg/dL (06-06-21 @ 08:07)    BP: --  Lipid Panel: Date/Time: 05-06-21 @ 04:14  Cholesterol, Serum: 151  Direct LDL: --  HDL Cholesterol, Serum: 59  Total Cholesterol/HDL Ration Measurement: --  Triglycerides, Serum: 81  
BMI: BMI (kg/m2): 20.2 (05-15-21 @ 10:37)  HbA1c: A1C with Estimated Average Glucose Result: 5.4 % (05-06-21 @ 04:14)    Glucose: POCT Blood Glucose.: 82 mg/dL (06-06-21 @ 08:07)    BP: 150/90 (06-06-21 @ 08:36) (150/90 - 150/90)  Lipid Panel: Date/Time: 05-06-21 @ 04:14  Cholesterol, Serum: 151  Direct LDL: --  HDL Cholesterol, Serum: 59  Total Cholesterol/HDL Ration Measurement: --  Triglycerides, Serum: 81  
BMI: BMI (kg/m2): 20.2 (05-15-21 @ 10:37)  HbA1c: A1C with Estimated Average Glucose Result: 5.4 % (05-06-21 @ 04:14)    Glucose: POCT Blood Glucose.: 82 mg/dL (06-06-21 @ 08:07)    BP: 150/90 (07-02-21 @ 08:18) (135/83 - 150/90)  Lipid Panel: Date/Time: 05-06-21 @ 04:14  Cholesterol, Serum: 151  Direct LDL: --  HDL Cholesterol, Serum: 59  Total Cholesterol/HDL Ration Measurement: --  Triglycerides, Serum: 81  
BMI: BMI (kg/m2): 20.2 (05-15-21 @ 10:37)  HbA1c: A1C with Estimated Average Glucose Result: 5.4 % (05-06-21 @ 04:14)    Glucose: POCT Blood Glucose.: 82 mg/dL (06-06-21 @ 08:07)    BP: 138/92 (06-13-21 @ 07:45) (138/92 - 149/90)  Lipid Panel: Date/Time: 05-06-21 @ 04:14  Cholesterol, Serum: 151  Direct LDL: --  HDL Cholesterol, Serum: 59  Total Cholesterol/HDL Ration Measurement: --  Triglycerides, Serum: 81  
BMI: BMI (kg/m2): 22.4 (07-10-21 @ 08:05)  HbA1c: A1C with Estimated Average Glucose Result: 5.4 % (05-06-21 @ 04:14)    Glucose: POCT Blood Glucose.: 82 mg/dL (06-06-21 @ 08:07)    BP: 150/92 (07-16-21 @ 08:15) (146/79 - 150/92)  Lipid Panel: Date/Time: 05-06-21 @ 04:14  Cholesterol, Serum: 151  Direct LDL: --  HDL Cholesterol, Serum: 59  Total Cholesterol/HDL Ration Measurement: --  Triglycerides, Serum: 81

## 2021-07-19 NOTE — BH INPATIENT PSYCHIATRY PROGRESS NOTE - NSTXPROBSLFCRE_PSY_ALL_CORE
SELF-CARE DEFICIT

## 2021-07-19 NOTE — BH INPATIENT PSYCHIATRY PROGRESS NOTE - NS ED B[facility] REVIEW OF ED CHART VITAL SIGNS REVIEWED
Yes

## 2021-07-19 NOTE — BH INPATIENT PSYCHIATRY PROGRESS NOTE - NSTXSLFCREINTERMD_PSY_ALL_CORE
psychoeducation, encouragement of ADL's.  CRISSY
psychoeducation, encouragement of ADL's.  CRISSY
psychoeducation, encouragement of ADL's
psychoeducation, encouragement of ADL's
psychoeducation, encouragement of ADL's.  CRISSY
psychoeducation, encouragement of ADL's.  CRISSY
psychoeducation, encouragement of ADL's
psychoeducation, encouragement of ADL's.  CRISSY
psychoeducation, encouragement of ADL's
psychoeducation, encouragement of ADL's
psychoeducation, encouragement of ADL's.  CRISSY
psychoeducation, encouragement of ADL's
psychoeducation, encouragement of ADL's.  CRISSY
psychoeducation, encouragement of ADL's.  CRISSY
psychoeducation, encouragement of ADL's
psychoeducation, encouragement of ADL's.  CRISSY

## 2021-07-19 NOTE — BH INPATIENT PSYCHIATRY PROGRESS NOTE - MSE OPTIONS
Unstructured MSE

## 2021-07-19 NOTE — BH DISCHARGE NOTE NURSING/SOCIAL WORK/PSYCH REHAB - NSBHREFERPURPOSE1_PSY_ALL_CORE
Please note, that after you complete sub-acute rehab you may return to/Other... Please note, that after you complete sub-acute rehab you may seek to return to the following: Psychiatrist Dr. Guy: [phone]; Marisol Rivera, Employment  at [employer] [phone]. Please contact providers accordingly. Thank you./Other...

## 2021-07-19 NOTE — BH INPATIENT PSYCHIATRY PROGRESS NOTE - NSTXDCOPNODATETRGT_PSY_ALL_CORE
15-Yann-2021
18-May-2021
25-May-2021
30-Jun-2021
30-Jun-2021
01-Jun-2021
18-May-2021
21-Jul-2021
25-May-2021
08-Jun-2021
14-Jul-2021
01-Jun-2021
01-Jun-2021
18-May-2021
18-May-2021
25-May-2021
14-Jul-2021
15-Yann-2021
25-May-2021
18-May-2021
08-Jun-2021
30-Jun-2021
14-Jul-2021
08-Jun-2021
21-Jul-2021
14-Jul-2021
01-Jun-2021
14-Jul-2021
14-Jul-2021
18-May-2021
30-Jun-2021
15-Yann-2021
21-Jul-2021
15-Yann-2021
14-Jul-2021
18-May-2021
01-Jun-2021
25-May-2021
15-Yann-2021
01-Jun-2021
18-May-2021
25-May-2021
08-Jun-2021
14-Jul-2021
25-May-2021
21-Jul-2021
30-Jun-2021
08-Jun-2021
30-Jun-2021
14-Jul-2021
21-Jul-2021

## 2021-07-19 NOTE — BH INPATIENT PSYCHIATRY PROGRESS NOTE - PRN MEDS
MEDICATIONS  (PRN):  artificial tears (preservative free) Ophthalmic Solution 1 Drop(s) Both EYES two times a day PRN Dry Eyes  diphenhydrAMINE 50 milliGRAM(s) Oral every 6 hours PRN agitation  diphenhydrAMINE   Injectable 50 milliGRAM(s) IntraMuscular once PRN severe agitation  haloperidol     Tablet 5 milliGRAM(s) Oral every 6 hours PRN agitation  haloperidol    Injectable 5 milliGRAM(s) IntraMuscular once PRN severe agitation  
MEDICATIONS  (PRN):  artificial tears (preservative free) Ophthalmic Solution 1 Drop(s) Both EYES two times a day PRN Dry Eyes  diphenhydrAMINE 50 milliGRAM(s) Oral every 6 hours PRN agitation  diphenhydrAMINE   Injectable 50 milliGRAM(s) IntraMuscular once PRN severe agitation  haloperidol     Tablet 5 milliGRAM(s) Oral every 6 hours PRN agitation  haloperidol    Injectable 5 milliGRAM(s) IntraMuscular once PRN severe agitation  LORazepam     Tablet 2 milliGRAM(s) Oral every 6 hours PRN agitation  LORazepam   Injectable 2 milliGRAM(s) IntraMuscular once PRN severe agitation  
MEDICATIONS  (PRN):  artificial tears (preservative free) Ophthalmic Solution 1 Drop(s) Both EYES two times a day PRN Dry Eyes  diphenhydrAMINE 50 milliGRAM(s) Oral every 6 hours PRN agitation  diphenhydrAMINE   Injectable 50 milliGRAM(s) IntraMuscular once PRN severe agitation  haloperidol     Tablet 5 milliGRAM(s) Oral every 6 hours PRN agitation  haloperidol    Injectable 5 milliGRAM(s) IntraMuscular once PRN severe agitation  
MEDICATIONS  (PRN):  artificial tears (preservative free) Ophthalmic Solution 1 Drop(s) Both EYES two times a day PRN Dry Eyes  diphenhydrAMINE 50 milliGRAM(s) Oral every 6 hours PRN agitation  diphenhydrAMINE   Injectable 50 milliGRAM(s) IntraMuscular once PRN severe agitation  haloperidol     Tablet 5 milliGRAM(s) Oral every 6 hours PRN agitation  haloperidol    Injectable 5 milliGRAM(s) IntraMuscular once PRN severe agitation  LORazepam     Tablet 2 milliGRAM(s) Oral every 6 hours PRN agitation  LORazepam   Injectable 2 milliGRAM(s) IntraMuscular once PRN severe agitation  
MEDICATIONS  (PRN):  artificial tears (preservative free) Ophthalmic Solution 1 Drop(s) Both EYES two times a day PRN Dry Eyes  diphenhydrAMINE 50 milliGRAM(s) Oral every 6 hours PRN agitation  diphenhydrAMINE   Injectable 50 milliGRAM(s) IntraMuscular once PRN severe agitation  haloperidol     Tablet 5 milliGRAM(s) Oral every 6 hours PRN agitation  haloperidol    Injectable 5 milliGRAM(s) IntraMuscular once PRN severe agitation  
MEDICATIONS  (PRN):  artificial tears (preservative free) Ophthalmic Solution 1 Drop(s) Both EYES two times a day PRN Dry Eyes  diphenhydrAMINE 50 milliGRAM(s) Oral every 6 hours PRN agitation  diphenhydrAMINE   Injectable 50 milliGRAM(s) IntraMuscular once PRN severe agitation  haloperidol     Tablet 5 milliGRAM(s) Oral every 6 hours PRN agitation  haloperidol    Injectable 5 milliGRAM(s) IntraMuscular once PRN severe agitation  LORazepam     Tablet 2 milliGRAM(s) Oral every 6 hours PRN agitation  LORazepam   Injectable 2 milliGRAM(s) IntraMuscular once PRN severe agitation  
MEDICATIONS  (PRN):  artificial tears (preservative free) Ophthalmic Solution 1 Drop(s) Both EYES two times a day PRN Dry Eyes  diphenhydrAMINE 50 milliGRAM(s) Oral every 6 hours PRN agitation  diphenhydrAMINE   Injectable 50 milliGRAM(s) IntraMuscular once PRN severe agitation  haloperidol     Tablet 5 milliGRAM(s) Oral every 6 hours PRN agitation  haloperidol    Injectable 5 milliGRAM(s) IntraMuscular once PRN severe agitation  LORazepam     Tablet 2 milliGRAM(s) Oral every 6 hours PRN agitation  LORazepam   Injectable 2 milliGRAM(s) IntraMuscular once PRN severe agitation  
MEDICATIONS  (PRN):  artificial tears (preservative free) Ophthalmic Solution 1 Drop(s) Both EYES two times a day PRN Dry Eyes  diphenhydrAMINE 50 milliGRAM(s) Oral every 6 hours PRN agitation  diphenhydrAMINE   Injectable 50 milliGRAM(s) IntraMuscular once PRN severe agitation  haloperidol     Tablet 5 milliGRAM(s) Oral every 6 hours PRN agitation  haloperidol    Injectable 5 milliGRAM(s) IntraMuscular once PRN severe agitation  
MEDICATIONS  (PRN):  artificial tears (preservative free) Ophthalmic Solution 1 Drop(s) Both EYES two times a day PRN Dry Eyes  diphenhydrAMINE 50 milliGRAM(s) Oral every 6 hours PRN agitation  diphenhydrAMINE   Injectable 50 milliGRAM(s) IntraMuscular once PRN severe agitation  haloperidol     Tablet 5 milliGRAM(s) Oral every 6 hours PRN agitation  haloperidol    Injectable 5 milliGRAM(s) IntraMuscular once PRN severe agitation  LORazepam     Tablet 2 milliGRAM(s) Oral every 6 hours PRN agitation  LORazepam   Injectable 2 milliGRAM(s) IntraMuscular once PRN severe agitation  
MEDICATIONS  (PRN):  artificial tears (preservative free) Ophthalmic Solution 1 Drop(s) Both EYES two times a day PRN Dry Eyes  diphenhydrAMINE 50 milliGRAM(s) Oral every 6 hours PRN agitation  diphenhydrAMINE   Injectable 50 milliGRAM(s) IntraMuscular once PRN severe agitation  haloperidol     Tablet 5 milliGRAM(s) Oral every 6 hours PRN agitation  haloperidol    Injectable 5 milliGRAM(s) IntraMuscular once PRN severe agitation  
MEDICATIONS  (PRN):  artificial tears (preservative free) Ophthalmic Solution 1 Drop(s) Both EYES two times a day PRN Dry Eyes  diphenhydrAMINE 50 milliGRAM(s) Oral every 6 hours PRN agitation  diphenhydrAMINE   Injectable 50 milliGRAM(s) IntraMuscular once PRN severe agitation  haloperidol     Tablet 5 milliGRAM(s) Oral every 6 hours PRN agitation  haloperidol    Injectable 5 milliGRAM(s) IntraMuscular once PRN severe agitation  LORazepam     Tablet 2 milliGRAM(s) Oral every 6 hours PRN agitation  LORazepam   Injectable 2 milliGRAM(s) IntraMuscular once PRN severe agitation  
MEDICATIONS  (PRN):  artificial tears (preservative free) Ophthalmic Solution 1 Drop(s) Both EYES two times a day PRN Dry Eyes  diphenhydrAMINE 50 milliGRAM(s) Oral every 6 hours PRN agitation  diphenhydrAMINE   Injectable 50 milliGRAM(s) IntraMuscular once PRN severe agitation  haloperidol     Tablet 5 milliGRAM(s) Oral every 6 hours PRN agitation  haloperidol    Injectable 5 milliGRAM(s) IntraMuscular once PRN severe agitation  LORazepam     Tablet 2 milliGRAM(s) Oral every 6 hours PRN agitation  LORazepam   Injectable 2 milliGRAM(s) IntraMuscular once PRN severe agitation  
MEDICATIONS  (PRN):  artificial tears (preservative free) Ophthalmic Solution 1 Drop(s) Both EYES two times a day PRN Dry Eyes  diphenhydrAMINE 50 milliGRAM(s) Oral every 6 hours PRN agitation  diphenhydrAMINE   Injectable 50 milliGRAM(s) IntraMuscular once PRN severe agitation  haloperidol     Tablet 5 milliGRAM(s) Oral every 6 hours PRN agitation  haloperidol    Injectable 5 milliGRAM(s) IntraMuscular once PRN severe agitation  
MEDICATIONS  (PRN):  artificial tears (preservative free) Ophthalmic Solution 1 Drop(s) Both EYES two times a day PRN Dry Eyes  diphenhydrAMINE 50 milliGRAM(s) Oral every 6 hours PRN agitation  diphenhydrAMINE   Injectable 50 milliGRAM(s) IntraMuscular once PRN severe agitation  haloperidol     Tablet 5 milliGRAM(s) Oral every 6 hours PRN agitation  haloperidol    Injectable 5 milliGRAM(s) IntraMuscular once PRN severe agitation  LORazepam     Tablet 2 milliGRAM(s) Oral every 6 hours PRN agitation  LORazepam   Injectable 2 milliGRAM(s) IntraMuscular once PRN severe agitation  
MEDICATIONS  (PRN):  artificial tears (preservative free) Ophthalmic Solution 1 Drop(s) Both EYES two times a day PRN Dry Eyes  diphenhydrAMINE 50 milliGRAM(s) Oral every 6 hours PRN agitation  diphenhydrAMINE   Injectable 50 milliGRAM(s) IntraMuscular once PRN severe agitation  haloperidol     Tablet 5 milliGRAM(s) Oral every 6 hours PRN agitation  haloperidol    Injectable 5 milliGRAM(s) IntraMuscular once PRN severe agitation  LORazepam     Tablet 2 milliGRAM(s) Oral every 6 hours PRN agitation  LORazepam   Injectable 2 milliGRAM(s) IntraMuscular once PRN severe agitation  
MEDICATIONS  (PRN):  artificial tears (preservative free) Ophthalmic Solution 1 Drop(s) Both EYES two times a day PRN Dry Eyes  diphenhydrAMINE 50 milliGRAM(s) Oral every 6 hours PRN agitation  diphenhydrAMINE   Injectable 50 milliGRAM(s) IntraMuscular once PRN severe agitation  haloperidol     Tablet 5 milliGRAM(s) Oral every 6 hours PRN agitation  haloperidol    Injectable 5 milliGRAM(s) IntraMuscular once PRN severe agitation  
MEDICATIONS  (PRN):  artificial tears (preservative free) Ophthalmic Solution 1 Drop(s) Both EYES two times a day PRN Dry Eyes  diphenhydrAMINE 50 milliGRAM(s) Oral every 6 hours PRN agitation  diphenhydrAMINE   Injectable 50 milliGRAM(s) IntraMuscular once PRN severe agitation  haloperidol     Tablet 5 milliGRAM(s) Oral every 6 hours PRN agitation  haloperidol    Injectable 5 milliGRAM(s) IntraMuscular once PRN severe agitation  
MEDICATIONS  (PRN):  artificial tears (preservative free) Ophthalmic Solution 1 Drop(s) Both EYES two times a day PRN Dry Eyes  diphenhydrAMINE 50 milliGRAM(s) Oral every 6 hours PRN agitation  diphenhydrAMINE   Injectable 50 milliGRAM(s) IntraMuscular once PRN severe agitation  haloperidol     Tablet 5 milliGRAM(s) Oral every 6 hours PRN agitation  haloperidol    Injectable 5 milliGRAM(s) IntraMuscular once PRN severe agitation  LORazepam     Tablet 2 milliGRAM(s) Oral every 6 hours PRN agitation  LORazepam   Injectable 2 milliGRAM(s) IntraMuscular once PRN severe agitation  
MEDICATIONS  (PRN):  artificial tears (preservative free) Ophthalmic Solution 1 Drop(s) Both EYES two times a day PRN Dry Eyes  diphenhydrAMINE 50 milliGRAM(s) Oral every 6 hours PRN agitation  diphenhydrAMINE   Injectable 50 milliGRAM(s) IntraMuscular once PRN severe agitation  haloperidol     Tablet 5 milliGRAM(s) Oral every 6 hours PRN agitation  haloperidol    Injectable 5 milliGRAM(s) IntraMuscular once PRN severe agitation  
MEDICATIONS  (PRN):  artificial tears (preservative free) Ophthalmic Solution 1 Drop(s) Both EYES two times a day PRN Dry Eyes  diphenhydrAMINE 50 milliGRAM(s) Oral every 6 hours PRN agitation  diphenhydrAMINE   Injectable 50 milliGRAM(s) IntraMuscular once PRN severe agitation  haloperidol     Tablet 5 milliGRAM(s) Oral every 6 hours PRN agitation  haloperidol    Injectable 5 milliGRAM(s) IntraMuscular once PRN severe agitation  
MEDICATIONS  (PRN):  artificial tears (preservative free) Ophthalmic Solution 1 Drop(s) Both EYES two times a day PRN Dry Eyes  diphenhydrAMINE 50 milliGRAM(s) Oral every 6 hours PRN agitation  diphenhydrAMINE   Injectable 50 milliGRAM(s) IntraMuscular once PRN severe agitation  haloperidol     Tablet 5 milliGRAM(s) Oral every 6 hours PRN agitation  haloperidol    Injectable 5 milliGRAM(s) IntraMuscular once PRN severe agitation  LORazepam     Tablet 2 milliGRAM(s) Oral every 6 hours PRN agitation  LORazepam   Injectable 2 milliGRAM(s) IntraMuscular once PRN severe agitation  
MEDICATIONS  (PRN):  artificial tears (preservative free) Ophthalmic Solution 1 Drop(s) Both EYES two times a day PRN Dry Eyes  diphenhydrAMINE 50 milliGRAM(s) Oral every 6 hours PRN agitation  diphenhydrAMINE   Injectable 50 milliGRAM(s) IntraMuscular once PRN severe agitation  haloperidol     Tablet 5 milliGRAM(s) Oral every 6 hours PRN agitation  haloperidol    Injectable 5 milliGRAM(s) IntraMuscular once PRN severe agitation  LORazepam     Tablet 2 milliGRAM(s) Oral every 6 hours PRN agitation  LORazepam   Injectable 2 milliGRAM(s) IntraMuscular once PRN severe agitation  
MEDICATIONS  (PRN):  artificial tears (preservative free) Ophthalmic Solution 1 Drop(s) Both EYES two times a day PRN Dry Eyes  diphenhydrAMINE 50 milliGRAM(s) Oral every 6 hours PRN agitation  diphenhydrAMINE   Injectable 50 milliGRAM(s) IntraMuscular once PRN severe agitation  haloperidol     Tablet 5 milliGRAM(s) Oral every 6 hours PRN agitation  haloperidol    Injectable 5 milliGRAM(s) IntraMuscular once PRN severe agitation  
MEDICATIONS  (PRN):  artificial tears (preservative free) Ophthalmic Solution 1 Drop(s) Both EYES two times a day PRN Dry Eyes  diphenhydrAMINE 50 milliGRAM(s) Oral every 6 hours PRN agitation  diphenhydrAMINE   Injectable 50 milliGRAM(s) IntraMuscular once PRN severe agitation  haloperidol     Tablet 5 milliGRAM(s) Oral every 6 hours PRN agitation  haloperidol    Injectable 5 milliGRAM(s) IntraMuscular once PRN severe agitation  
MEDICATIONS  (PRN):  artificial tears (preservative free) Ophthalmic Solution 1 Drop(s) Both EYES two times a day PRN Dry Eyes  diphenhydrAMINE 50 milliGRAM(s) Oral every 6 hours PRN agitation  diphenhydrAMINE   Injectable 50 milliGRAM(s) IntraMuscular once PRN severe agitation  haloperidol     Tablet 5 milliGRAM(s) Oral every 6 hours PRN agitation  haloperidol    Injectable 5 milliGRAM(s) IntraMuscular once PRN severe agitation  LORazepam     Tablet 2 milliGRAM(s) Oral every 6 hours PRN agitation  LORazepam   Injectable 2 milliGRAM(s) IntraMuscular once PRN severe agitation  
MEDICATIONS  (PRN):  artificial tears (preservative free) Ophthalmic Solution 1 Drop(s) Both EYES two times a day PRN Dry Eyes  diphenhydrAMINE 50 milliGRAM(s) Oral every 6 hours PRN agitation  diphenhydrAMINE   Injectable 50 milliGRAM(s) IntraMuscular once PRN severe agitation  haloperidol     Tablet 5 milliGRAM(s) Oral every 6 hours PRN agitation  haloperidol    Injectable 5 milliGRAM(s) IntraMuscular once PRN severe agitation  
MEDICATIONS  (PRN):  artificial tears (preservative free) Ophthalmic Solution 1 Drop(s) Both EYES two times a day PRN Dry Eyes  diphenhydrAMINE 50 milliGRAM(s) Oral every 6 hours PRN agitation  diphenhydrAMINE   Injectable 50 milliGRAM(s) IntraMuscular once PRN severe agitation  haloperidol     Tablet 5 milliGRAM(s) Oral every 6 hours PRN agitation  haloperidol    Injectable 5 milliGRAM(s) IntraMuscular once PRN severe agitation  
MEDICATIONS  (PRN):  artificial tears (preservative free) Ophthalmic Solution 1 Drop(s) Both EYES two times a day PRN Dry Eyes  diphenhydrAMINE 50 milliGRAM(s) Oral every 6 hours PRN agitation  diphenhydrAMINE   Injectable 50 milliGRAM(s) IntraMuscular once PRN severe agitation  haloperidol     Tablet 5 milliGRAM(s) Oral every 6 hours PRN agitation  haloperidol    Injectable 5 milliGRAM(s) IntraMuscular once PRN severe agitation  LORazepam     Tablet 2 milliGRAM(s) Oral every 6 hours PRN agitation  LORazepam   Injectable 2 milliGRAM(s) IntraMuscular once PRN severe agitation  
MEDICATIONS  (PRN):  artificial tears (preservative free) Ophthalmic Solution 1 Drop(s) Both EYES two times a day PRN Dry Eyes  diphenhydrAMINE 50 milliGRAM(s) Oral every 6 hours PRN agitation  diphenhydrAMINE   Injectable 50 milliGRAM(s) IntraMuscular once PRN severe agitation  haloperidol     Tablet 5 milliGRAM(s) Oral every 6 hours PRN agitation  haloperidol    Injectable 5 milliGRAM(s) IntraMuscular once PRN severe agitation  LORazepam     Tablet 2 milliGRAM(s) Oral every 6 hours PRN agitation  LORazepam   Injectable 2 milliGRAM(s) IntraMuscular once PRN severe agitation  
MEDICATIONS  (PRN):  artificial tears (preservative free) Ophthalmic Solution 1 Drop(s) Both EYES two times a day PRN Dry Eyes  diphenhydrAMINE 50 milliGRAM(s) Oral every 6 hours PRN agitation  diphenhydrAMINE   Injectable 50 milliGRAM(s) IntraMuscular once PRN severe agitation  haloperidol     Tablet 5 milliGRAM(s) Oral every 6 hours PRN agitation  haloperidol    Injectable 5 milliGRAM(s) IntraMuscular once PRN severe agitation  
MEDICATIONS  (PRN):  artificial tears (preservative free) Ophthalmic Solution 1 Drop(s) Both EYES two times a day PRN Dry Eyes  diphenhydrAMINE 50 milliGRAM(s) Oral every 6 hours PRN agitation  diphenhydrAMINE   Injectable 50 milliGRAM(s) IntraMuscular once PRN severe agitation  haloperidol     Tablet 5 milliGRAM(s) Oral every 6 hours PRN agitation  haloperidol    Injectable 5 milliGRAM(s) IntraMuscular once PRN severe agitation  
MEDICATIONS  (PRN):  artificial tears (preservative free) Ophthalmic Solution 1 Drop(s) Both EYES two times a day PRN Dry Eyes  diphenhydrAMINE 50 milliGRAM(s) Oral every 6 hours PRN agitation  diphenhydrAMINE   Injectable 50 milliGRAM(s) IntraMuscular once PRN severe agitation  haloperidol     Tablet 5 milliGRAM(s) Oral every 6 hours PRN agitation  haloperidol    Injectable 5 milliGRAM(s) IntraMuscular once PRN severe agitation  LORazepam     Tablet 2 milliGRAM(s) Oral every 6 hours PRN agitation  LORazepam   Injectable 2 milliGRAM(s) IntraMuscular once PRN severe agitation  
MEDICATIONS  (PRN):  artificial tears (preservative free) Ophthalmic Solution 1 Drop(s) Both EYES two times a day PRN Dry Eyes  diphenhydrAMINE 50 milliGRAM(s) Oral every 6 hours PRN agitation  diphenhydrAMINE   Injectable 50 milliGRAM(s) IntraMuscular once PRN severe agitation  haloperidol     Tablet 5 milliGRAM(s) Oral every 6 hours PRN agitation  haloperidol    Injectable 5 milliGRAM(s) IntraMuscular once PRN severe agitation  LORazepam     Tablet 2 milliGRAM(s) Oral every 6 hours PRN agitation  LORazepam   Injectable 2 milliGRAM(s) IntraMuscular once PRN severe agitation  
MEDICATIONS  (PRN):  artificial tears (preservative free) Ophthalmic Solution 1 Drop(s) Both EYES two times a day PRN Dry Eyes  diphenhydrAMINE 50 milliGRAM(s) Oral every 6 hours PRN agitation  diphenhydrAMINE   Injectable 50 milliGRAM(s) IntraMuscular once PRN severe agitation  haloperidol     Tablet 5 milliGRAM(s) Oral every 6 hours PRN agitation  haloperidol    Injectable 5 milliGRAM(s) IntraMuscular once PRN severe agitation  
MEDICATIONS  (PRN):  artificial tears (preservative free) Ophthalmic Solution 1 Drop(s) Both EYES two times a day PRN Dry Eyes  diphenhydrAMINE 50 milliGRAM(s) Oral every 6 hours PRN agitation  diphenhydrAMINE   Injectable 50 milliGRAM(s) IntraMuscular once PRN severe agitation  haloperidol     Tablet 5 milliGRAM(s) Oral every 6 hours PRN agitation  haloperidol    Injectable 5 milliGRAM(s) IntraMuscular once PRN severe agitation  LORazepam     Tablet 2 milliGRAM(s) Oral every 6 hours PRN agitation  LORazepam   Injectable 2 milliGRAM(s) IntraMuscular once PRN severe agitation  
MEDICATIONS  (PRN):  artificial tears (preservative free) Ophthalmic Solution 1 Drop(s) Both EYES two times a day PRN Dry Eyes  diphenhydrAMINE 50 milliGRAM(s) Oral every 6 hours PRN agitation  diphenhydrAMINE   Injectable 50 milliGRAM(s) IntraMuscular once PRN severe agitation  haloperidol     Tablet 5 milliGRAM(s) Oral every 6 hours PRN agitation  haloperidol    Injectable 5 milliGRAM(s) IntraMuscular once PRN severe agitation  LORazepam     Tablet 2 milliGRAM(s) Oral every 6 hours PRN agitation  LORazepam   Injectable 2 milliGRAM(s) IntraMuscular once PRN severe agitation  
MEDICATIONS  (PRN):  artificial tears (preservative free) Ophthalmic Solution 1 Drop(s) Both EYES two times a day PRN Dry Eyes  diphenhydrAMINE 50 milliGRAM(s) Oral every 6 hours PRN agitation  diphenhydrAMINE   Injectable 50 milliGRAM(s) IntraMuscular once PRN severe agitation  haloperidol     Tablet 5 milliGRAM(s) Oral every 6 hours PRN agitation  haloperidol    Injectable 5 milliGRAM(s) IntraMuscular once PRN severe agitation  

## 2021-07-19 NOTE — BH INPATIENT PSYCHIATRY PROGRESS NOTE - NSBHCONTPROVIDER_PSY_ALL_CORE
Yes...
No...
No...
Yes...
No...
Yes...
No...
Yes...
No...
Yes...
Yes...
No...
Yes...
No...
Yes...
No...
Yes...
No...
Yes...
No...
Yes...
No...
No...
Yes...

## 2021-07-19 NOTE — BH DISCHARGE NOTE NURSING/SOCIAL WORK/PSYCH REHAB - NSBHDCAGENCY1FT_PSY_A_CORE
[facility] [place] [facility], Contact: Kaila (Intake)

## 2021-07-19 NOTE — BH INPATIENT PSYCHIATRY PROGRESS NOTE - NSTXSUPORTINTERMD_PSY_ALL_CORE
Discuss with family discharge plan and discuss resources for after subacute rehab. 
Discuss with family discharge plan and discuss resources for after subacute rehab.

## 2021-07-19 NOTE — BH INPATIENT PSYCHIATRY PROGRESS NOTE - NSTXPSYCHOGOAL_PSY_ALL_CORE
Other...
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
## 2021-07-19 NOTE — BH INPATIENT PSYCHIATRY DISCHARGE NOTE - NSBHMETABOLIC_PSY_ALL_CORE_FT
BMI: BMI (kg/m2): 22.4 (07-10-21 @ 08:05)  HbA1c: A1C with Estimated Average Glucose Result: 5.4 % (05-06-21 @ 04:14)    Glucose: POCT Blood Glucose.: 82 mg/dL (06-06-21 @ 08:07)    BP: 155/95 (07-18-21 @ 08:45) (155/95 - 163/85)  Lipid Panel: Date/Time: 05-06-21 @ 04:14  Cholesterol, Serum: 151  Direct LDL: --  HDL Cholesterol, Serum: 59  Total Cholesterol/HDL Ration Measurement: --  Triglycerides, Serum: 81

## 2021-07-19 NOTE — BH DISCHARGE NOTE NURSING/SOCIAL WORK/PSYCH REHAB - PATIENT PORTAL LINK FT
You can access the FollowMyHealth Patient Portal offered by [facility] by registering at the following website: [email or web address]. By joining [employer]’s FollowMyHealth portal, you will also be able to view your health information using other applications (apps) compatible with our system.

## 2021-07-19 NOTE — BH INPATIENT PSYCHIATRY DISCHARGE NOTE - HOSPITAL COURSE
HPI: 45M with PPHx of schizophrenia brought in by father for change in mental status and functional deterioration, admitted to medicine for HTN which normalized, transferred to psychiatry for continued evaluation/treatment of worsening psychosis and catatonia.       Dates of Hospitalization:5/10/21 - 7/19/2021    Upon admission, patient experienced psychosis and later develop symptoms of catatonia.    Patient was started on home medications of Risperidal 2 mg daily and Zoloft 100 mg and titrated up to Risperidal 4 mg daily with good effect and tolerability. Patient then developed signs of catatonia such as mutism, stereotypy, waxy flexibility and negativism. Patient was then started on Ativian 2 mg TID and tapered down to 1mg BID. Symptoms gradually improved over the course of hospitalization. The patient was made aware of the risks and benefits of each medication and tolerated them well with no apparent side effects.    On the day of discharge, the patient’s mood and affect are euthymic. Patient denies depressed mood or anxiety. Sleep and appetite are also normal (at baseline).  Pt’s motor performance and productivity of speech are WNL. Pt denies symptoms of psychosis including AH/VH with no apparent delusions (or is at baseline/not preoccupied with delusions).  Patient denies S/H/I/I/P.    There were no behavioral problems on the unit.  Patient did not become agitated, did not require emergent intramuscular medications or seclusion/restraints, and did not self-harm on the unit. Patient remained actively engaged in treatment and participated in individual, group, and milieu therapy.  Patient got along appropriately with staff and peers.  Family is supportive and available.     Patient was provided with extensive psychoeducation on treatment options and motivational counseling targeting healthy life-style. Patient was instructed on actions for crisis situations, understood and agreed to follow instructions for handling crisis, including coming to ER or calling 911 should the patient or his family feel that he is in danger of hurting self or others. Patient also was given Suicide Prevention Lifeline number 3-203-251-0690 and provided with instructions on its use.      Suicidal and aggression risk assessments were performed on the day of discharge. The patient is at elevated chronic risk of self-harm due to an underlying Schizophrenia. He is not actively suicidal, manic, or inebriated, making him appropriate for less restrictive treatment as an outpatient without need for continued inpatient hospitalization.  Risk factors include: psychosocial stressors, housing instability.  Protective factors include future orientation, social support, treatment engagement, med compliance, lack of access, and lack of substance use. Immediate risk was minimized by inpatient admission to a safe environment with appropriate supervision and limited access to lethal means. Future risk was minimized before discharge by treatment of psychotic symptoms, maximizing outpatient support, providing relevant patient education, discussing emergency procedures, and ensuring close follow-up. Patient denies all suicidal and aggressive/homicidal ideation, intent and plan, and, in view of demonstrated clinical improvement, is not judged to be an acute danger to self or others at this time. Although the patient remains at their chronic baseline risk of self-harm, such risk cannot be further ameliorated by continued inpatient treatment and the patient is therefore appropriate for discharge.    Patient has made clinically meaningful progress during his hospitalization and has clearly benefited from medications and psychotherapy. On day of discharge, the patient has improved significantly and no longer requires inpatient treatment and care. Pt will be discharged and follow-up with outpatient care.   Patient will be discharged with the following DSM5 Diagnoses: Schizophrenia  Patient will be discharged on the following medications: Risperidal 4mg daily, Zoloft 100 mg daily, Ativan 1 mg BID, Memantine 10 mg, Amlodipine 10 mg,

## 2021-07-19 NOTE — BH INPATIENT PSYCHIATRY PROGRESS NOTE - NSTXSLFCREGOAL_PSY_ALL_CORE
Be able to demonstrate the ability to care for self in 2 areas such as feeding oneself and hygiene
Will perform ADLs without assistance/prompts daily
Be able to demonstrate the ability to care for self in 2 areas such as feeding oneself and hygiene
Be able to demonstrate the ability to care for self in 2 areas such as feeding oneself and hygiene
Will perform ADLs without assistance/prompts daily
Be able to demonstrate the ability to care for self in 2 areas such as feeding oneself and hygiene
Will engage in physical activity for 15 minutes daily
Be able to demonstrate the ability to care for self in 2 areas such as feeding oneself and hygiene
Will engage in physical activity for 15 minutes daily
Will perform ADLs without assistance/prompts daily
Will perform ADLs without assistance/prompts daily
Be able to demonstrate the ability to care for self in 2 areas such as feeding oneself and hygiene
Will perform ADLs without assistance/prompts daily
Will engage in physical activity for 15 minutes daily

## 2021-07-19 NOTE — BH INPATIENT PSYCHIATRY PROGRESS NOTE - NSBHCONSULTIPREASON_PSY_A_CORE
danger to self; mental illness expected to respond to inpatient care
No
danger to self; mental illness expected to respond to inpatient care

## 2021-07-19 NOTE — BH INPATIENT PSYCHIATRY PROGRESS NOTE - TELEPSYCHIATRY?
No

## 2021-07-19 NOTE — BH INPATIENT PSYCHIATRY PROGRESS NOTE - NSTXSLFCREDATEEST_PSY_ALL_CORE
11-May-2021
20-Jun-2021
11-May-2021
20-Jun-2021
11-May-2021
20-Jun-2021
11-May-2021
20-Jun-2021
11-May-2021
20-Jun-2021
11-May-2021
20-Jun-2021
11-May-2021
20-Jun-2021
11-May-2021
20-Jun-2021
11-May-2021
20-Jun-2021
20-Jun-2021
11-May-2021
20-Jun-2021
11-May-2021
20-Jun-2021
20-Jun-2021

## 2021-07-19 NOTE — BH INPATIENT PSYCHIATRY PROGRESS NOTE - NSTXSLFCREPROGRES_PSY_ALL_CORE
Improving
Improving
No Change
Improving
Met - goal discontinued
Met - goal discontinued
Improving
Met - goal discontinued
Improving
Improving
No Change
Improving
Met - goal discontinued
Improving
Met - goal discontinued
Improving
Improving
No Change
Improving
No Change
Improving
No Change
Improving
Improving
Met - goal discontinued
Improving
Improving
No Change
Improving
Met - goal discontinued
Improving
Improving
Met - goal discontinued

## 2021-07-19 NOTE — BH INPATIENT PSYCHIATRY PROGRESS NOTE - NSBHFUPINTERVALCCFT_PSY_A_CORE
Seen for psychosis/catatonia
Seen for psychosis/catatonia.
Seen for catatonia
Seen for psychosis
Seen for psychosis/catatonia.
Seen for catatonia
Unable to elicit information
seen for psychosis and catatonia
Seen for catatonia
Seen for psychosis
seen for psychosis/catatonia
Seen for catatonia
seen for catatonia
Pt seen f/u for psychosis
Seen for catatonia
Seen for catatonia f/u
Seen for psychosis/catatonia.
"I'm ok"
Pt seen f/u for psychosis
Seen for psychosis
Seen for psychosis/catatonia.
seen for psychosis/catatonia
Seen for catatonia
Seen for catatonia f/u
Seen for psychosis/catatonia.
Seen for psychosis/catatonia.
Seen for catatonia
seen for psychosis/catatonia	
Seen for catatonia f/u
seen for psychosis/catatonia
seen for psychosis/catatonia
Seen for psychosis
Seen for psychosis/catatonia.
Pt seen f/u for psychosis and catatonia
Seen for psychosis
catatonia
Pt seen f/u for psychosis and catatonia
Seen for catatonia f/u
seen for psychosis/catatonia
seen for psychosis/catatonia
Pt seen f/u for psychosis and catatonia
Seen for psychosis/catatonia.
catatonia
Pt seen f/u for psychosis
Seen for catatonia/psychosis
Seen for psychosis/catatonia.
seen for psychosis/catatonia
Seen for psychosis/catatonia.
Seen for psychosis/catatonia.
catatonia
Seen for psychosis/catatonia.

## 2021-07-19 NOTE — BH INPATIENT PSYCHIATRY PROGRESS NOTE - NSTXPSYCHOINTERMD_PSY_ALL_CORE
Medication management and psychotherapy 
Medication management and psychotherapy. 
Medication management and psychotherapy 
Medication management and psychotherapy. 
Medication management and psychotherapy 

## 2021-07-19 NOTE — BH INPATIENT PSYCHIATRY PROGRESS NOTE - NSBHMETABOLICLABS_PSY_ALL_CORE
Labs within last 12 months

## 2021-07-19 NOTE — BH INPATIENT PSYCHIATRY PROGRESS NOTE - NSTXPSYCHODATEEST_PSY_ALL_CORE
15-Jul-2021
20-May-2021
15-Jul-2021
20-May-2021

## 2021-07-19 NOTE — BH INPATIENT PSYCHIATRY PROGRESS NOTE - NSTXPSYCHOPROGRES_PSY_ALL_CORE
Improving
No Change
Improving
Met - goal discontinued
Improving
Improving
Met - goal discontinued
Improving
No Change
Improving
No Change
Improving
Improving
No Change
Improving
No Change
Improving
Met - goal discontinued
No Change
Improving

## 2021-07-19 NOTE — BH INPATIENT PSYCHIATRY DISCHARGE NOTE - OTHER PAST PSYCHIATRIC HISTORY (INCLUDE DETAILS REGARDING ONSET, COURSE OF ILLNESS, INPATIENT/OUTPATIENT TREATMENT)
As per  Inpatient Psychiatry Assessment Note on 5/10/2021: "45M with PPHx of schizophrenia brought in by father for change in mental status and functional deterioration, admitted to medicine for HTN which normalized, now transferred to psychiatry for continued evaluation/treatment of worsening psychosis and functional status."

## 2021-07-19 NOTE — BH INPATIENT PSYCHIATRY PROGRESS NOTE - NSBHLEGALSTATUSDATE_PSY_ALL_CORE
30-Jun-2021 12:30

## 2021-07-19 NOTE — BH INPATIENT PSYCHIATRY DISCHARGE NOTE - NSDCMRMEDTOKEN_GEN_ALL_CORE_FT
amLODIPine 10 mg oral tablet: 1 tab(s) orally once a day  LORazepam 1 mg oral tablet: 1 tab(s) orally 2 times a day  memantine 10 mg oral tablet: 1 tab(s) orally 2 times a day  ocular lubricant ophthalmic solution: 1 drop(s) to each affected eye 2 times a day, As needed, Dry Eyes  risperiDONE 4 mg oral tablet: 1 tab(s) orally once a day  sertraline 100 mg oral tablet: 1 tab(s) orally once a day

## 2021-07-19 NOTE — BH INPATIENT PSYCHIATRY PROGRESS NOTE - NSTXPROBSUPORT_PSY_ALL_CORE
SUPPORT SYSTEM, INADEQUATE

## 2021-07-19 NOTE — BH INPATIENT PSYCHIATRY PROGRESS NOTE - NSBHFUPMEDSE_PSY_A_CORE
None known

## 2021-07-19 NOTE — BH INPATIENT PSYCHIATRY DISCHARGE NOTE - NSBHDCMEDICALFT_PSY_A_CORE
Medically, during this hospitalization he had elevated blood pressure and was started on amlodipine 5 mg. Blood pressure continued to be elevated and medication was titrated to 10 mg. Currently, blood pressure 140s/80s.

## 2021-07-19 NOTE — BH INPATIENT PSYCHIATRY PROGRESS NOTE - NSTXSUPORTGOAL_PSY_ALL_CORE
Patient will attend groups to promote social skill development

## 2021-07-19 NOTE — BH INPATIENT PSYCHIATRY PROGRESS NOTE - NSTXSLFCREDATENEW_PSY_ALL_CORE
18-Jul-2021
18-Jul-2021
27-Jun-2021
18-Jul-2021
27-Jun-2021
18-Jul-2021

## 2021-07-19 NOTE — BH INPATIENT PSYCHIATRY PROGRESS NOTE - MSE UNSTRUCTURED FT
Appearance: Dressed in hospital gown, sitting down in day room watching tv. Behavior: Good eye contact, cooperative and pleasant during interview. Speech: normal rate and decrease volume. Movement: No PMR/A. Mood: "good." Affect: congruent. Euthymic. TP: Coherent, goal-oriented and linear. TC: Denies AVH, SI, HI or delusions.  Insight: limited, Judgment: fair, Attention: good, Gait/station: good Appearance: Dressed in causal clothing, sitting down in dining room. Good hygiene and well-groomed. Behavior: Good eye contact, cooperative and pleasant during interview. Speech: normal rate and decrease volume. Movement: No PMR/A. Mood: "good." Affect: congruent. Euthymic. TP: Coherent, goal-oriented and linear. TC: Denies AVH, SI, HI or delusions.  Insight: limited, Judgment: good, Attention: good, Gait/station: good

## 2021-07-19 NOTE — BH INPATIENT PSYCHIATRY PROGRESS NOTE - NSBHFUPINTERVALHXFT_PSY_A_CORE
Patient seen for follow up for psychosis, chart reviewed, and case discussed with treatment team.  No events reported overnight.  The patient continues to be active on unit.  He states his mood is good, and denies any psychotic symptoms. The patient reports eating and sleeping well.  He has been compliant with medications, tolerating them well.

## 2021-07-19 NOTE — BH INPATIENT PSYCHIATRY PROGRESS NOTE - NSTXDCOPNOINTERMD_PSY_ALL_CORE
Provide education on the importance of follow-up with outpatient resources to continue being stable.
Provide education on the importance of follow-up with outpatient resources to continue being stable.

## 2021-07-19 NOTE — BH INPATIENT PSYCHIATRY PROGRESS NOTE - NSTREATMENTCERTY_PSY_ALL_CORE

## 2021-07-19 NOTE — BH INPATIENT PSYCHIATRY PROGRESS NOTE - NSBHASSESSSUMMFT_PSY_ALL_CORE
45M w/schizophrenia, admitted with catatonia and suspected psychosis/depression.      Catatonia resolved with no psychosis. Pt continues to have stable mood, sleep and appetite. ADLs improving.  Pt has been accepted for [facility] and awaiting placement.  No contraindications to discharge /cleared from psych point of view.     Plan:  - Continue current medications  - Await [facility] placement - applications sent     45M w/schizophrenia, admitted with catatonia and suspected psychosis/depression. Overall more than 30 minutes were spent working on this case: including educating and instructing patient, chart review, working on discharge assessment, coordination of care, and discharge summary.      Assessment:   Patient was seen individually for discharge assessment in an extensive interview today. Patient has made clinically meaningful progress over hospital course and clearly benefitted from unit structure, medications, and psychotherapeutic interventions. While initially patient was displaying psychotic symptoms and development of catatonia, symptoms improved significantly with medication and psychotherapy. He sleeps well. His delusion and AVH have resolved. Patient’s motor performance, rate and productivity of speech are WNL. No agitation/retardation. No psychotic symptoms are present. Catatonic symptoms have resolved and is stable on current dose of Ativan 1 mg BID. Patient is in good control. Patient has no suicidal/homicidal ideations, intents, and plans. Patient has some significant chronic risks  given his Schizophrenia diagnosis but in our assessment chronic risks are mitigated by patient’s clinical improvement, willingness to cooperate with outpatient treatment, social support, and future oriented attitude. Presently patient is not an acute suicidal/homicidal/aggression risk in view of demonstrated clinical improvement and improvement in patient’s coping. Patient understands reasons for recommendation to continue treatment with subacute rehabilitation.     Plan:   Patient consolidated gains of this hospitalization and can continue treatment as an outpatient. Patient was provided with extensive psychoeducation on treatment options and motivational counseling. Patient was instructed on actions for crisis situations, understood and agreed to follow instructions for handling crisis, including coming to ER or calling 911 should patient or his family/friends feel that he is in danger of hurting self or others. Patient also was given Suicide Prevention Lifeline number 1-349.689.3462 and provided with instructions on its use. Motivational counseling was provided to encourage compliance. Patient was provided information about subacute rehabilitation placement. Patient’s family supports him in discharge.    -Continue amLODIPine   Tablet 10 milliGRAM(s) Oral daily  - Continue LORazepam     Tablet 1 milliGRAM(s) Oral BID  - Continue memantine 10 milliGRAM(s) Oral BID  - Continue risperiDONE   Tablet 4 milliGRAM(s) Oral daily  - Continue sertraline 100 milliGRAM(s) Oral daily

## 2021-07-19 NOTE — BH INPATIENT PSYCHIATRY PROGRESS NOTE - NSICDXBHPRIMARYDX_PSY_ALL_CORE
Schizophrenia   F20.9  

## 2021-07-19 NOTE — BH INPATIENT PSYCHIATRY PROGRESS NOTE - NSTXPROBDCOPNO_PSY_ALL_CORE
DISCHARGE ISSUE - NON-ADHERENT WITH OUTPATIENT SERVICES

## 2021-07-19 NOTE — BH INPATIENT PSYCHIATRY PROGRESS NOTE - NSTXSLFCREDATETRGT_PSY_ALL_CORE
18-May-2021
04-Jul-2021
20-Jun-2021
18-May-2021
07-Jul-2021
18-May-2021
20-Jun-2021
21-Jul-2021
20-Jun-2021
13-Jun-2021
11-Jul-2021
24-May-2021
31-May-2021
07-Jul-2021
20-Jun-2021
24-May-2021
11-Jul-2021
24-May-2021
04-Jul-2021
04-Jul-2021
07-Jul-2021
24-May-2021
24-May-2021
20-Jun-2021
21-Jul-2021
07-Jul-2021
20-Jun-2021
13-Jun-2021
18-May-2021
31-May-2021
24-May-2021
07-Jul-2021
13-Jun-2021
18-May-2021
24-May-2021
20-Jun-2021
31-May-2021
11-Jul-2021
20-Jun-2021
13-Jun-2021
18-May-2021
20-Jun-2021
31-May-2021
11-Jul-2021
31-May-2021
13-Jun-2021
31-May-2021
21-Jul-2021
04-Jul-2021
20-Jun-2021
04-Jul-2021
11-Jul-2021

## 2021-07-19 NOTE — BH INPATIENT PSYCHIATRY PROGRESS NOTE - NSTXDCOPNOGOAL_PSY_ALL_CORE
Will agree to participate in appropriate outpatient care

## 2021-07-19 NOTE — BH INPATIENT PSYCHIATRY PROGRESS NOTE - NSBHATTESTNPTRAINEE_PSY_A_CORE
agree

## 2021-07-19 NOTE — BH INPATIENT PSYCHIATRY PROGRESS NOTE - CASE SUMMARY
The patient has continued to do well on the unit.  His mood is good, denies psychosis, eating well, compliant with medications.  The patient is no longer an acute danger to himself or others, and is stable for discharge to subacute rehab.

## 2021-07-19 NOTE — BH INPATIENT PSYCHIATRY DISCHARGE NOTE - HPI (INCLUDE ILLNESS QUALITY, SEVERITY, DURATION, TIMING, CONTEXT, MODIFYING FACTORS, ASSOCIATED SIGNS AND SYMPTOMS)
45M with PPHx of schizophrenia brought in by father for change in mental status and functional deterioration, admitted to medicine for HTN which normalized, now transferred to psychiatry for continued evaluation/treatment of worsening psychosis and functional status.     In interview patient is minimally able to participate. States that he needs to go home. Denies SI/HI/AVH. Complaints of pain from "the needle" (unable to elaborate further) and c/o pain in his legs when he stands. Mostly lays in bed staring at ceiling, but several times sits up and disrobes after being assisted multiple times to help put gown back on. At one point is observed be attempting to put hospital gown back on by slipping his feet through the arm holes.     Per notes from [facility], in initial evaluation patient was severely hypertensive, sitting in bed, agitated and diaphoretic, and minimally responsive to questions. Per note from [facility] follow up and collateral they obtained (which provides most of the HPI given patient's inability to provide information):    "Patient AOx2. Upon eval, patient sitting up in bed teary-eyed with red/puffy eyes, multiple ecchymoses noted in various stages of healing. States that he has been rubbing his eyes a lot. Patient very withdrawn and not making eye contact on interview with flat, scared affect. Disorganized thought process with poverty of speech/thought. Patient states that he does not know why is in the hospital, and adds that he does not want to be here. When asked about life at home, replies that it is alright. States "I don't want to talk about it" when asked about his relationship with his father. Admits to being a private person. States that he slept okay last night. Denies feeling of depression, anxiety, AH/VH, SI/HI.     Collateral obtained from patient's father this afternoon: Father states that the patient's psych history began in 2002 when he was hospitalized for depression shortly after losing his job at [employer]. He said that the patient would get very angry at little things at this time. Stated that patient thought he was wrestler [employer]. and that he would make odd noises and roll around on the floor. Prior to this time, the patient did not have any formal diagnoses but as per father, "he was a frail and quiet kid who did not have any friends growing up and was bullied often by his peers."   The patient was discharged from his initial hospitalization in 2002 with sertraline 100mg, Risperidone 4mg, and Haldol. Father states that the family decided to stop giving him the Haldol right away because the patient was drooling and spacey since starting the medication. They also began cutting the Risperidone in half and were only giving him 2mg every day, starting in 2002. The patient's prescribing doctor Dr. Sanabria was not made aware of theses changes until about 1 month ago. Upon discharge in 2002, patient also began following up with psychiatry and was referred to a day program at [facility] which he attended every day until the beginning of the pandemic in March 2020.   About 1 month ago, father states that the patient began to appear more withdrawn. He was no longer getting himself dressed in the morning, eating, or going to the store/walks like he usually did. Father endorses that he would grab his son by his arm and pull him forward to try to get him to help out around the house or get himself dressed in the morning. Instead, he would sit on the couch all day and not do anything. Before this change, the patient was not very talkative at baseline but he would go to the store every morning to get his father a bagel and the paper and would help his mother around the house. He said that he still did not have much of a social life, and would often sit in the corner not engaged by anyone. They contacted the patient's psychiatrist when they noticed this change and told him that he was only talking 2mg of the Risperidone. They were advised to have him take the prescribed dose of 4mg, which he did for a short period, but when the family noticed no change they brought it back down to 2mg. Father added that he would tell the patient to wake him up at 10pm to make sure he took his medications, but the patient did not always do this. He was unsure if the patient was missing meds/how often.   The father denied any other hospitalizations other than the initial encounter in 2002 and the current admission. Denied any knowledge of a formal psychiatric diagnosis for his son. Denies his son ever telling him about AH/VH, SI/HI. Denies any substance use or identified stressors other than the change in routine brought about by the start of the pandemic.   Father endorses that he himself was hospitalized at 18y/o for an "emotional breakdown" and was diagnosed with schizophrenia. He was in [facility] for 4 months, and after that followed up for about 1 year with psychiatry. He decided on his own to stop his meds cold turkey and "fought my way out of it." "

## 2021-07-19 NOTE — BH INPATIENT PSYCHIATRY PROGRESS NOTE - NSTXDCOPNODATEEST_PSY_ALL_CORE
11-May-2021
12-Jul-2021
11-May-2021
12-Jul-2021
11-May-2021
11-May-2021
12-Jul-2021
11-May-2021
12-Jul-2021
11-May-2021
12-Jul-2021

## 2021-07-19 NOTE — BH INPATIENT PSYCHIATRY PROGRESS NOTE - NSTXDCOPNOPROGRES_PSY_ALL_CORE
Improving
No Change
No Change
Improving
No Change
No Change
Improving
No Change
Improving
No Change
Improving
No Change
Improving
No Change
Improving
Improving
No Change
Improving
No Change
Improving
No Change
No Change
Improving
Improving
No Change
Improving
Improving
No Change
No Change
Improving
No Change
No Change

## 2021-07-19 NOTE — BH INPATIENT PSYCHIATRY PROGRESS NOTE - NS ED BHA MED ROS PSYCHIATRIC
See HPI

## 2021-07-19 NOTE — BH INPATIENT PSYCHIATRY PROGRESS NOTE - NSTREATMENTCERT_PSY_ALL_CORE
.

## 2021-07-19 NOTE — [facility] INPATIENT PSYCHIATRY PROGRESS NOTE - CURRENT MEDICATION
MEDICATIONS  (STANDING):  amLODIPine   Tablet 5 milliGRAM(s) Oral daily  LORazepam     Tablet 1 milliGRAM(s) Oral three times a day  memantine 10 milliGRAM(s) Oral two times a day  risperiDONE   Tablet 4 milliGRAM(s) Oral daily  sertraline 100 milliGRAM(s) Oral daily    MEDICATIONS  (PRN):  artificial tears (preservative free) Ophthalmic Solution 1 Drop(s) Both EYES two times a day PRN Dry Eyes  diphenhydrAMINE 50 milliGRAM(s) Oral every 6 hours PRN agitation  diphenhydrAMINE   Injectable 50 milliGRAM(s) IntraMuscular once PRN severe agitation  haloperidol     Tablet 5 milliGRAM(s) Oral every 6 hours PRN agitation  haloperidol    Injectable 5 milliGRAM(s) IntraMuscular once PRN severe agitation  
MEDICATIONS  (STANDING):  amLODIPine   Tablet 5 milliGRAM(s) Oral daily  LORazepam     Tablet 1.5 milliGRAM(s) Oral <User Schedule>  memantine 10 milliGRAM(s) Oral two times a day  risperiDONE   Tablet 4 milliGRAM(s) Oral daily  sertraline 100 milliGRAM(s) Oral daily    MEDICATIONS  (PRN):  artificial tears (preservative free) Ophthalmic Solution 1 Drop(s) Both EYES two times a day PRN Dry Eyes  diphenhydrAMINE 50 milliGRAM(s) Oral every 6 hours PRN agitation  diphenhydrAMINE   Injectable 50 milliGRAM(s) IntraMuscular once PRN severe agitation  haloperidol     Tablet 5 milliGRAM(s) Oral every 6 hours PRN agitation  haloperidol    Injectable 5 milliGRAM(s) IntraMuscular once PRN severe agitation  
MEDICATIONS  (STANDING):  amLODIPine   Tablet 5 milliGRAM(s) Oral daily  LORazepam     Tablet 2 milliGRAM(s) Oral four times a day  memantine 10 milliGRAM(s) Oral daily  memantine 5 milliGRAM(s) Oral at bedtime  risperiDONE   Tablet 4 milliGRAM(s) Oral daily  sertraline 100 milliGRAM(s) Oral daily    MEDICATIONS  (PRN):  artificial tears (preservative free) Ophthalmic Solution 1 Drop(s) Both EYES two times a day PRN Dry Eyes  diphenhydrAMINE 50 milliGRAM(s) Oral every 6 hours PRN agitation  diphenhydrAMINE   Injectable 50 milliGRAM(s) IntraMuscular once PRN severe agitation  haloperidol     Tablet 5 milliGRAM(s) Oral every 6 hours PRN agitation  haloperidol    Injectable 5 milliGRAM(s) IntraMuscular once PRN severe agitation  LORazepam     Tablet 2 milliGRAM(s) Oral every 6 hours PRN agitation  LORazepam   Injectable 2 milliGRAM(s) IntraMuscular once PRN severe agitation  
MEDICATIONS  (STANDING):  amLODIPine   Tablet 5 milliGRAM(s) Oral daily  LORazepam     Tablet 2 milliGRAM(s) Oral four times a day  memantine 5 milliGRAM(s) Oral at bedtime  memantine 10 milliGRAM(s) Oral daily  risperiDONE   Tablet 4 milliGRAM(s) Oral daily  sertraline 100 milliGRAM(s) Oral daily    MEDICATIONS  (PRN):  artificial tears (preservative free) Ophthalmic Solution 1 Drop(s) Both EYES two times a day PRN Dry Eyes  diphenhydrAMINE 50 milliGRAM(s) Oral every 6 hours PRN agitation  diphenhydrAMINE   Injectable 50 milliGRAM(s) IntraMuscular once PRN severe agitation  haloperidol     Tablet 5 milliGRAM(s) Oral every 6 hours PRN agitation  haloperidol    Injectable 5 milliGRAM(s) IntraMuscular once PRN severe agitation  LORazepam     Tablet 2 milliGRAM(s) Oral every 6 hours PRN agitation  LORazepam   Injectable 2 milliGRAM(s) IntraMuscular once PRN severe agitation  
MEDICATIONS  (STANDING):  amLODIPine   Tablet 5 milliGRAM(s) Oral daily  LORazepam     Tablet 2 milliGRAM(s) Oral four times a day  risperiDONE   Tablet 2 milliGRAM(s) Oral daily  sertraline 100 milliGRAM(s) Oral daily    MEDICATIONS  (PRN):  artificial tears (preservative free) Ophthalmic Solution 1 Drop(s) Both EYES two times a day PRN Dry Eyes  diphenhydrAMINE 50 milliGRAM(s) Oral every 6 hours PRN agitation  diphenhydrAMINE   Injectable 50 milliGRAM(s) IntraMuscular once PRN severe agitation  haloperidol     Tablet 5 milliGRAM(s) Oral every 6 hours PRN agitation  haloperidol    Injectable 5 milliGRAM(s) IntraMuscular once PRN severe agitation  LORazepam     Tablet 2 milliGRAM(s) Oral every 6 hours PRN agitation  LORazepam   Injectable 2 milliGRAM(s) IntraMuscular once PRN severe agitation  
MEDICATIONS  (STANDING):  amLODIPine   Tablet 5 milliGRAM(s) Oral daily  LORazepam     Tablet 2 milliGRAM(s) Oral four times a day  risperiDONE   Tablet 4 milliGRAM(s) Oral daily  sertraline 100 milliGRAM(s) Oral daily    MEDICATIONS  (PRN):  artificial tears (preservative free) Ophthalmic Solution 1 Drop(s) Both EYES two times a day PRN Dry Eyes  diphenhydrAMINE 50 milliGRAM(s) Oral every 6 hours PRN agitation  diphenhydrAMINE   Injectable 50 milliGRAM(s) IntraMuscular once PRN severe agitation  haloperidol     Tablet 5 milliGRAM(s) Oral every 6 hours PRN agitation  haloperidol    Injectable 5 milliGRAM(s) IntraMuscular once PRN severe agitation  LORazepam     Tablet 2 milliGRAM(s) Oral every 6 hours PRN agitation  LORazepam   Injectable 2 milliGRAM(s) IntraMuscular once PRN severe agitation  
MEDICATIONS  (STANDING):  amLODIPine   Tablet 5 milliGRAM(s) Oral daily  LORazepam     Tablet 1 milliGRAM(s) Oral <User Schedule>  LORazepam     Tablet 0.5 milliGRAM(s) Oral <User Schedule>  memantine 10 milliGRAM(s) Oral two times a day  risperiDONE   Tablet 4 milliGRAM(s) Oral daily  sertraline 100 milliGRAM(s) Oral daily    MEDICATIONS  (PRN):  artificial tears (preservative free) Ophthalmic Solution 1 Drop(s) Both EYES two times a day PRN Dry Eyes  diphenhydrAMINE 50 milliGRAM(s) Oral every 6 hours PRN agitation  diphenhydrAMINE   Injectable 50 milliGRAM(s) IntraMuscular once PRN severe agitation  haloperidol     Tablet 5 milliGRAM(s) Oral every 6 hours PRN agitation  haloperidol    Injectable 5 milliGRAM(s) IntraMuscular once PRN severe agitation  
MEDICATIONS  (STANDING):  amLODIPine   Tablet 5 milliGRAM(s) Oral daily  LORazepam     Tablet 1 milliGRAM(s) Oral three times a day  memantine 10 milliGRAM(s) Oral two times a day  risperiDONE   Tablet 4 milliGRAM(s) Oral daily  sertraline 100 milliGRAM(s) Oral daily    MEDICATIONS  (PRN):  artificial tears (preservative free) Ophthalmic Solution 1 Drop(s) Both EYES two times a day PRN Dry Eyes  diphenhydrAMINE 50 milliGRAM(s) Oral every 6 hours PRN agitation  diphenhydrAMINE   Injectable 50 milliGRAM(s) IntraMuscular once PRN severe agitation  haloperidol     Tablet 5 milliGRAM(s) Oral every 6 hours PRN agitation  haloperidol    Injectable 5 milliGRAM(s) IntraMuscular once PRN severe agitation  
MEDICATIONS  (STANDING):  amLODIPine   Tablet 10 milliGRAM(s) Oral daily  LORazepam     Tablet 1 milliGRAM(s) Oral <User Schedule>  memantine 10 milliGRAM(s) Oral two times a day  risperiDONE   Tablet 4 milliGRAM(s) Oral daily  sertraline 100 milliGRAM(s) Oral daily    MEDICATIONS  (PRN):  artificial tears (preservative free) Ophthalmic Solution 1 Drop(s) Both EYES two times a day PRN Dry Eyes  diphenhydrAMINE 50 milliGRAM(s) Oral every 6 hours PRN agitation  diphenhydrAMINE   Injectable 50 milliGRAM(s) IntraMuscular once PRN severe agitation  haloperidol     Tablet 5 milliGRAM(s) Oral every 6 hours PRN agitation  haloperidol    Injectable 5 milliGRAM(s) IntraMuscular once PRN severe agitation  
MEDICATIONS  (STANDING):  amLODIPine   Tablet 5 milliGRAM(s) Oral daily  LORazepam     Tablet 1 milliGRAM(s) Oral <User Schedule>  LORazepam     Tablet 1.5 milliGRAM(s) Oral <User Schedule>  memantine 10 milliGRAM(s) Oral two times a day  risperiDONE   Tablet 4 milliGRAM(s) Oral daily  sertraline 100 milliGRAM(s) Oral daily    MEDICATIONS  (PRN):  artificial tears (preservative free) Ophthalmic Solution 1 Drop(s) Both EYES two times a day PRN Dry Eyes  diphenhydrAMINE 50 milliGRAM(s) Oral every 6 hours PRN agitation  diphenhydrAMINE   Injectable 50 milliGRAM(s) IntraMuscular once PRN severe agitation  haloperidol     Tablet 5 milliGRAM(s) Oral every 6 hours PRN agitation  haloperidol    Injectable 5 milliGRAM(s) IntraMuscular once PRN severe agitation  
MEDICATIONS  (STANDING):  amLODIPine   Tablet 5 milliGRAM(s) Oral daily  LORazepam     Tablet 1 milliGRAM(s) Oral <User Schedule>  memantine 10 milliGRAM(s) Oral two times a day  risperiDONE   Tablet 4 milliGRAM(s) Oral daily  sertraline 100 milliGRAM(s) Oral daily    MEDICATIONS  (PRN):  artificial tears (preservative free) Ophthalmic Solution 1 Drop(s) Both EYES two times a day PRN Dry Eyes  diphenhydrAMINE 50 milliGRAM(s) Oral every 6 hours PRN agitation  diphenhydrAMINE   Injectable 50 milliGRAM(s) IntraMuscular once PRN severe agitation  haloperidol     Tablet 5 milliGRAM(s) Oral every 6 hours PRN agitation  haloperidol    Injectable 5 milliGRAM(s) IntraMuscular once PRN severe agitation  
MEDICATIONS  (STANDING):  amLODIPine   Tablet 10 milliGRAM(s) Oral daily  LORazepam     Tablet 1 milliGRAM(s) Oral <User Schedule>  memantine 10 milliGRAM(s) Oral two times a day  risperiDONE   Tablet 4 milliGRAM(s) Oral daily  sertraline 100 milliGRAM(s) Oral daily    MEDICATIONS  (PRN):  artificial tears (preservative free) Ophthalmic Solution 1 Drop(s) Both EYES two times a day PRN Dry Eyes  diphenhydrAMINE 50 milliGRAM(s) Oral every 6 hours PRN agitation  diphenhydrAMINE   Injectable 50 milliGRAM(s) IntraMuscular once PRN severe agitation  haloperidol     Tablet 5 milliGRAM(s) Oral every 6 hours PRN agitation  haloperidol    Injectable 5 milliGRAM(s) IntraMuscular once PRN severe agitation  
MEDICATIONS  (STANDING):  amLODIPine   Tablet 5 milliGRAM(s) Oral daily  LORazepam     Tablet 1 milliGRAM(s) Oral <User Schedule>  LORazepam     Tablet 0.5 milliGRAM(s) Oral <User Schedule>  memantine 10 milliGRAM(s) Oral two times a day  risperiDONE   Tablet 4 milliGRAM(s) Oral daily  sertraline 100 milliGRAM(s) Oral daily    MEDICATIONS  (PRN):  artificial tears (preservative free) Ophthalmic Solution 1 Drop(s) Both EYES two times a day PRN Dry Eyes  diphenhydrAMINE 50 milliGRAM(s) Oral every 6 hours PRN agitation  diphenhydrAMINE   Injectable 50 milliGRAM(s) IntraMuscular once PRN severe agitation  haloperidol     Tablet 5 milliGRAM(s) Oral every 6 hours PRN agitation  haloperidol    Injectable 5 milliGRAM(s) IntraMuscular once PRN severe agitation  
MEDICATIONS  (STANDING):  amLODIPine   Tablet 5 milliGRAM(s) Oral daily  LORazepam     Tablet 2 milliGRAM(s) Oral four times a day  risperiDONE   Tablet 3 milliGRAM(s) Oral daily  sertraline 100 milliGRAM(s) Oral daily    MEDICATIONS  (PRN):  artificial tears (preservative free) Ophthalmic Solution 1 Drop(s) Both EYES two times a day PRN Dry Eyes  diphenhydrAMINE 50 milliGRAM(s) Oral every 6 hours PRN agitation  diphenhydrAMINE   Injectable 50 milliGRAM(s) IntraMuscular once PRN severe agitation  haloperidol     Tablet 5 milliGRAM(s) Oral every 6 hours PRN agitation  haloperidol    Injectable 5 milliGRAM(s) IntraMuscular once PRN severe agitation  LORazepam     Tablet 2 milliGRAM(s) Oral every 6 hours PRN agitation  LORazepam   Injectable 2 milliGRAM(s) IntraMuscular once PRN severe agitation  
MEDICATIONS  (STANDING):  amLODIPine   Tablet 5 milliGRAM(s) Oral daily  LORazepam     Tablet 1 milliGRAM(s) Oral three times a day  memantine 10 milliGRAM(s) Oral two times a day  risperiDONE   Tablet 4 milliGRAM(s) Oral daily  sertraline 100 milliGRAM(s) Oral daily    MEDICATIONS  (PRN):  artificial tears (preservative free) Ophthalmic Solution 1 Drop(s) Both EYES two times a day PRN Dry Eyes  diphenhydrAMINE 50 milliGRAM(s) Oral every 6 hours PRN agitation  diphenhydrAMINE   Injectable 50 milliGRAM(s) IntraMuscular once PRN severe agitation  haloperidol     Tablet 5 milliGRAM(s) Oral every 6 hours PRN agitation  haloperidol    Injectable 5 milliGRAM(s) IntraMuscular once PRN severe agitation  
MEDICATIONS  (STANDING):  amLODIPine   Tablet 5 milliGRAM(s) Oral daily  LORazepam     Tablet 2 milliGRAM(s) Oral four times a day  risperiDONE   Tablet 2 milliGRAM(s) Oral daily  sertraline 100 milliGRAM(s) Oral daily    MEDICATIONS  (PRN):  artificial tears (preservative free) Ophthalmic Solution 1 Drop(s) Both EYES two times a day PRN Dry Eyes  diphenhydrAMINE 50 milliGRAM(s) Oral every 6 hours PRN agitation  diphenhydrAMINE   Injectable 50 milliGRAM(s) IntraMuscular once PRN severe agitation  haloperidol     Tablet 5 milliGRAM(s) Oral every 6 hours PRN agitation  haloperidol    Injectable 5 milliGRAM(s) IntraMuscular once PRN severe agitation  LORazepam     Tablet 2 milliGRAM(s) Oral every 6 hours PRN agitation  LORazepam   Injectable 2 milliGRAM(s) IntraMuscular once PRN severe agitation  
MEDICATIONS  (STANDING):  amLODIPine   Tablet 5 milliGRAM(s) Oral daily  LORazepam     Tablet 2 milliGRAM(s) Oral four times a day  risperiDONE   Tablet 2 milliGRAM(s) Oral daily  sertraline 100 milliGRAM(s) Oral daily    MEDICATIONS  (PRN):  artificial tears (preservative free) Ophthalmic Solution 1 Drop(s) Both EYES two times a day PRN Dry Eyes  diphenhydrAMINE 50 milliGRAM(s) Oral every 6 hours PRN agitation  diphenhydrAMINE   Injectable 50 milliGRAM(s) IntraMuscular once PRN severe agitation  haloperidol     Tablet 5 milliGRAM(s) Oral every 6 hours PRN agitation  haloperidol    Injectable 5 milliGRAM(s) IntraMuscular once PRN severe agitation  LORazepam     Tablet 2 milliGRAM(s) Oral every 6 hours PRN agitation  LORazepam   Injectable 2 milliGRAM(s) IntraMuscular once PRN severe agitation  
MEDICATIONS  (STANDING):  amLODIPine   Tablet 5 milliGRAM(s) Oral daily  LORazepam     Tablet 2 milliGRAM(s) Oral four times a day  risperiDONE   Tablet 4 milliGRAM(s) Oral daily  sertraline 100 milliGRAM(s) Oral daily    MEDICATIONS  (PRN):  artificial tears (preservative free) Ophthalmic Solution 1 Drop(s) Both EYES two times a day PRN Dry Eyes  diphenhydrAMINE 50 milliGRAM(s) Oral every 6 hours PRN agitation  diphenhydrAMINE   Injectable 50 milliGRAM(s) IntraMuscular once PRN severe agitation  haloperidol     Tablet 5 milliGRAM(s) Oral every 6 hours PRN agitation  haloperidol    Injectable 5 milliGRAM(s) IntraMuscular once PRN severe agitation  LORazepam     Tablet 2 milliGRAM(s) Oral every 6 hours PRN agitation  LORazepam   Injectable 2 milliGRAM(s) IntraMuscular once PRN severe agitation  
MEDICATIONS  (STANDING):  amLODIPine   Tablet 5 milliGRAM(s) Oral daily  LORazepam     Tablet 2 milliGRAM(s) Oral three times a day  risperiDONE   Tablet 2 milliGRAM(s) Oral daily  sertraline 100 milliGRAM(s) Oral daily    MEDICATIONS  (PRN):  artificial tears (preservative free) Ophthalmic Solution 1 Drop(s) Both EYES two times a day PRN Dry Eyes  diphenhydrAMINE 50 milliGRAM(s) Oral every 6 hours PRN agitation  diphenhydrAMINE   Injectable 50 milliGRAM(s) IntraMuscular once PRN severe agitation  haloperidol     Tablet 5 milliGRAM(s) Oral every 6 hours PRN agitation  haloperidol    Injectable 5 milliGRAM(s) IntraMuscular once PRN severe agitation  LORazepam     Tablet 2 milliGRAM(s) Oral every 6 hours PRN agitation  LORazepam   Injectable 2 milliGRAM(s) IntraMuscular once PRN severe agitation  
MEDICATIONS  (STANDING):  amLODIPine   Tablet 5 milliGRAM(s) Oral daily  LORazepam     Tablet 2 milliGRAM(s) Oral <User Schedule>  LORazepam     Tablet 1.5 milliGRAM(s) Oral <User Schedule>  memantine 10 milliGRAM(s) Oral two times a day  risperiDONE   Tablet 4 milliGRAM(s) Oral daily  sertraline 100 milliGRAM(s) Oral daily    MEDICATIONS  (PRN):  artificial tears (preservative free) Ophthalmic Solution 1 Drop(s) Both EYES two times a day PRN Dry Eyes  diphenhydrAMINE 50 milliGRAM(s) Oral every 6 hours PRN agitation  diphenhydrAMINE   Injectable 50 milliGRAM(s) IntraMuscular once PRN severe agitation  haloperidol     Tablet 5 milliGRAM(s) Oral every 6 hours PRN agitation  haloperidol    Injectable 5 milliGRAM(s) IntraMuscular once PRN severe agitation  
MEDICATIONS  (STANDING):  amLODIPine   Tablet 5 milliGRAM(s) Oral daily  risperiDONE   Tablet 2 milliGRAM(s) Oral daily  sertraline 100 milliGRAM(s) Oral daily    MEDICATIONS  (PRN):  artificial tears (preservative free) Ophthalmic Solution 1 Drop(s) Both EYES two times a day PRN Dry Eyes  diphenhydrAMINE 50 milliGRAM(s) Oral every 6 hours PRN agitation  diphenhydrAMINE   Injectable 50 milliGRAM(s) IntraMuscular once PRN severe agitation  haloperidol     Tablet 5 milliGRAM(s) Oral every 6 hours PRN agitation  haloperidol    Injectable 5 milliGRAM(s) IntraMuscular once PRN severe agitation  LORazepam     Tablet 2 milliGRAM(s) Oral every 6 hours PRN agitation  LORazepam   Injectable 2 milliGRAM(s) IntraMuscular once PRN severe agitation  
MEDICATIONS  (STANDING):  amLODIPine   Tablet 5 milliGRAM(s) Oral daily  LORazepam     Tablet 1 milliGRAM(s) Oral three times a day  memantine 10 milliGRAM(s) Oral two times a day  risperiDONE   Tablet 4 milliGRAM(s) Oral daily  sertraline 100 milliGRAM(s) Oral daily    MEDICATIONS  (PRN):  artificial tears (preservative free) Ophthalmic Solution 1 Drop(s) Both EYES two times a day PRN Dry Eyes  diphenhydrAMINE 50 milliGRAM(s) Oral every 6 hours PRN agitation  diphenhydrAMINE   Injectable 50 milliGRAM(s) IntraMuscular once PRN severe agitation  haloperidol     Tablet 5 milliGRAM(s) Oral every 6 hours PRN agitation  haloperidol    Injectable 5 milliGRAM(s) IntraMuscular once PRN severe agitation  
MEDICATIONS  (STANDING):  amLODIPine   Tablet 5 milliGRAM(s) Oral daily  LORazepam     Tablet 2 milliGRAM(s) Oral four times a day  risperiDONE   Tablet 3 milliGRAM(s) Oral daily  sertraline 100 milliGRAM(s) Oral daily    MEDICATIONS  (PRN):  artificial tears (preservative free) Ophthalmic Solution 1 Drop(s) Both EYES two times a day PRN Dry Eyes  diphenhydrAMINE 50 milliGRAM(s) Oral every 6 hours PRN agitation  diphenhydrAMINE   Injectable 50 milliGRAM(s) IntraMuscular once PRN severe agitation  haloperidol     Tablet 5 milliGRAM(s) Oral every 6 hours PRN agitation  haloperidol    Injectable 5 milliGRAM(s) IntraMuscular once PRN severe agitation  LORazepam     Tablet 2 milliGRAM(s) Oral every 6 hours PRN agitation  LORazepam   Injectable 2 milliGRAM(s) IntraMuscular once PRN severe agitation  
MEDICATIONS  (STANDING):  amLODIPine   Tablet 5 milliGRAM(s) Oral daily  LORazepam     Tablet 2 milliGRAM(s) Oral four times a day  risperiDONE   Tablet 4 milliGRAM(s) Oral daily  sertraline 100 milliGRAM(s) Oral daily    MEDICATIONS  (PRN):  artificial tears (preservative free) Ophthalmic Solution 1 Drop(s) Both EYES two times a day PRN Dry Eyes  diphenhydrAMINE 50 milliGRAM(s) Oral every 6 hours PRN agitation  diphenhydrAMINE   Injectable 50 milliGRAM(s) IntraMuscular once PRN severe agitation  haloperidol     Tablet 5 milliGRAM(s) Oral every 6 hours PRN agitation  haloperidol    Injectable 5 milliGRAM(s) IntraMuscular once PRN severe agitation  LORazepam     Tablet 2 milliGRAM(s) Oral every 6 hours PRN agitation  LORazepam   Injectable 2 milliGRAM(s) IntraMuscular once PRN severe agitation  
MEDICATIONS  (STANDING):  amLODIPine   Tablet 5 milliGRAM(s) Oral daily  LORazepam     Tablet 1 milliGRAM(s) Oral <User Schedule>  memantine 10 milliGRAM(s) Oral two times a day  risperiDONE   Tablet 4 milliGRAM(s) Oral daily  sertraline 100 milliGRAM(s) Oral daily    MEDICATIONS  (PRN):  artificial tears (preservative free) Ophthalmic Solution 1 Drop(s) Both EYES two times a day PRN Dry Eyes  diphenhydrAMINE 50 milliGRAM(s) Oral every 6 hours PRN agitation  diphenhydrAMINE   Injectable 50 milliGRAM(s) IntraMuscular once PRN severe agitation  haloperidol     Tablet 5 milliGRAM(s) Oral every 6 hours PRN agitation  haloperidol    Injectable 5 milliGRAM(s) IntraMuscular once PRN severe agitation  
MEDICATIONS  (STANDING):  amLODIPine   Tablet 5 milliGRAM(s) Oral daily  LORazepam     Tablet 1 milliGRAM(s) Oral <User Schedule>  memantine 10 milliGRAM(s) Oral two times a day  risperiDONE   Tablet 4 milliGRAM(s) Oral daily  sertraline 100 milliGRAM(s) Oral daily    MEDICATIONS  (PRN):  artificial tears (preservative free) Ophthalmic Solution 1 Drop(s) Both EYES two times a day PRN Dry Eyes  diphenhydrAMINE 50 milliGRAM(s) Oral every 6 hours PRN agitation  diphenhydrAMINE   Injectable 50 milliGRAM(s) IntraMuscular once PRN severe agitation  haloperidol     Tablet 5 milliGRAM(s) Oral every 6 hours PRN agitation  haloperidol    Injectable 5 milliGRAM(s) IntraMuscular once PRN severe agitation  
MEDICATIONS  (STANDING):  amLODIPine   Tablet 5 milliGRAM(s) Oral daily  LORazepam     Tablet 2 milliGRAM(s) Oral four times a day  risperiDONE   Tablet 2 milliGRAM(s) Oral daily  sertraline 100 milliGRAM(s) Oral daily    MEDICATIONS  (PRN):  artificial tears (preservative free) Ophthalmic Solution 1 Drop(s) Both EYES two times a day PRN Dry Eyes  diphenhydrAMINE 50 milliGRAM(s) Oral every 6 hours PRN agitation  diphenhydrAMINE   Injectable 50 milliGRAM(s) IntraMuscular once PRN severe agitation  haloperidol     Tablet 5 milliGRAM(s) Oral every 6 hours PRN agitation  haloperidol    Injectable 5 milliGRAM(s) IntraMuscular once PRN severe agitation  LORazepam     Tablet 2 milliGRAM(s) Oral every 6 hours PRN agitation  LORazepam   Injectable 2 milliGRAM(s) IntraMuscular once PRN severe agitation  
MEDICATIONS  (STANDING):  amLODIPine   Tablet 5 milliGRAM(s) Oral daily  LORazepam     Tablet 1 milliGRAM(s) Oral three times a day  risperiDONE   Tablet 2 milliGRAM(s) Oral daily  sertraline 100 milliGRAM(s) Oral daily    MEDICATIONS  (PRN):  artificial tears (preservative free) Ophthalmic Solution 1 Drop(s) Both EYES two times a day PRN Dry Eyes  diphenhydrAMINE 50 milliGRAM(s) Oral every 6 hours PRN agitation  diphenhydrAMINE   Injectable 50 milliGRAM(s) IntraMuscular once PRN severe agitation  haloperidol     Tablet 5 milliGRAM(s) Oral every 6 hours PRN agitation  haloperidol    Injectable 5 milliGRAM(s) IntraMuscular once PRN severe agitation  LORazepam     Tablet 2 milliGRAM(s) Oral every 6 hours PRN agitation  LORazepam   Injectable 2 milliGRAM(s) IntraMuscular once PRN severe agitation  
MEDICATIONS  (STANDING):  amLODIPine   Tablet 5 milliGRAM(s) Oral daily  LORazepam     Tablet 1 milliGRAM(s) Oral <User Schedule>  LORazepam     Tablet 1.5 milliGRAM(s) Oral <User Schedule>  memantine 10 milliGRAM(s) Oral two times a day  risperiDONE   Tablet 4 milliGRAM(s) Oral daily  sertraline 100 milliGRAM(s) Oral daily    MEDICATIONS  (PRN):  artificial tears (preservative free) Ophthalmic Solution 1 Drop(s) Both EYES two times a day PRN Dry Eyes  diphenhydrAMINE 50 milliGRAM(s) Oral every 6 hours PRN agitation  diphenhydrAMINE   Injectable 50 milliGRAM(s) IntraMuscular once PRN severe agitation  haloperidol     Tablet 5 milliGRAM(s) Oral every 6 hours PRN agitation  haloperidol    Injectable 5 milliGRAM(s) IntraMuscular once PRN severe agitation  
MEDICATIONS  (STANDING):  amLODIPine   Tablet 5 milliGRAM(s) Oral daily  LORazepam     Tablet 2 milliGRAM(s) Oral four times a day  memantine 10 milliGRAM(s) Oral daily  risperiDONE   Tablet 4 milliGRAM(s) Oral daily  sertraline 100 milliGRAM(s) Oral daily    MEDICATIONS  (PRN):  artificial tears (preservative free) Ophthalmic Solution 1 Drop(s) Both EYES two times a day PRN Dry Eyes  diphenhydrAMINE 50 milliGRAM(s) Oral every 6 hours PRN agitation  diphenhydrAMINE   Injectable 50 milliGRAM(s) IntraMuscular once PRN severe agitation  haloperidol     Tablet 5 milliGRAM(s) Oral every 6 hours PRN agitation  haloperidol    Injectable 5 milliGRAM(s) IntraMuscular once PRN severe agitation  LORazepam     Tablet 2 milliGRAM(s) Oral every 6 hours PRN agitation  LORazepam   Injectable 2 milliGRAM(s) IntraMuscular once PRN severe agitation  
MEDICATIONS  (STANDING):  amLODIPine   Tablet 5 milliGRAM(s) Oral daily  LORazepam     Tablet 2 milliGRAM(s) Oral four times a day  memantine 5 milliGRAM(s) Oral at bedtime  memantine 10 milliGRAM(s) Oral daily  risperiDONE   Tablet 4 milliGRAM(s) Oral daily  sertraline 100 milliGRAM(s) Oral daily    MEDICATIONS  (PRN):  artificial tears (preservative free) Ophthalmic Solution 1 Drop(s) Both EYES two times a day PRN Dry Eyes  diphenhydrAMINE 50 milliGRAM(s) Oral every 6 hours PRN agitation  diphenhydrAMINE   Injectable 50 milliGRAM(s) IntraMuscular once PRN severe agitation  haloperidol     Tablet 5 milliGRAM(s) Oral every 6 hours PRN agitation  haloperidol    Injectable 5 milliGRAM(s) IntraMuscular once PRN severe agitation  LORazepam     Tablet 2 milliGRAM(s) Oral every 6 hours PRN agitation  LORazepam   Injectable 2 milliGRAM(s) IntraMuscular once PRN severe agitation  
MEDICATIONS  (STANDING):  amLODIPine   Tablet 5 milliGRAM(s) Oral daily  LORazepam     Tablet 2 milliGRAM(s) Oral four times a day  risperiDONE   Tablet 2 milliGRAM(s) Oral daily  sertraline 100 milliGRAM(s) Oral daily    MEDICATIONS  (PRN):  artificial tears (preservative free) Ophthalmic Solution 1 Drop(s) Both EYES two times a day PRN Dry Eyes  diphenhydrAMINE 50 milliGRAM(s) Oral every 6 hours PRN agitation  diphenhydrAMINE   Injectable 50 milliGRAM(s) IntraMuscular once PRN severe agitation  haloperidol     Tablet 5 milliGRAM(s) Oral every 6 hours PRN agitation  haloperidol    Injectable 5 milliGRAM(s) IntraMuscular once PRN severe agitation  LORazepam     Tablet 2 milliGRAM(s) Oral every 6 hours PRN agitation  LORazepam   Injectable 2 milliGRAM(s) IntraMuscular once PRN severe agitation  
MEDICATIONS  (STANDING):  amLODIPine   Tablet 5 milliGRAM(s) Oral daily  LORazepam     Tablet 2 milliGRAM(s) Oral four times a day  risperiDONE   Tablet 4 milliGRAM(s) Oral daily  sertraline 100 milliGRAM(s) Oral daily    MEDICATIONS  (PRN):  artificial tears (preservative free) Ophthalmic Solution 1 Drop(s) Both EYES two times a day PRN Dry Eyes  diphenhydrAMINE 50 milliGRAM(s) Oral every 6 hours PRN agitation  diphenhydrAMINE   Injectable 50 milliGRAM(s) IntraMuscular once PRN severe agitation  haloperidol     Tablet 5 milliGRAM(s) Oral every 6 hours PRN agitation  haloperidol    Injectable 5 milliGRAM(s) IntraMuscular once PRN severe agitation  LORazepam     Tablet 2 milliGRAM(s) Oral every 6 hours PRN agitation  LORazepam   Injectable 2 milliGRAM(s) IntraMuscular once PRN severe agitation  
MEDICATIONS  (STANDING):  amLODIPine   Tablet 5 milliGRAM(s) Oral daily  LORazepam     Tablet 1 milliGRAM(s) Oral <User Schedule>  memantine 10 milliGRAM(s) Oral two times a day  risperiDONE   Tablet 4 milliGRAM(s) Oral daily  sertraline 100 milliGRAM(s) Oral daily    MEDICATIONS  (PRN):  artificial tears (preservative free) Ophthalmic Solution 1 Drop(s) Both EYES two times a day PRN Dry Eyes  diphenhydrAMINE 50 milliGRAM(s) Oral every 6 hours PRN agitation  diphenhydrAMINE   Injectable 50 milliGRAM(s) IntraMuscular once PRN severe agitation  haloperidol     Tablet 5 milliGRAM(s) Oral every 6 hours PRN agitation  haloperidol    Injectable 5 milliGRAM(s) IntraMuscular once PRN severe agitation  
MEDICATIONS  (STANDING):  amLODIPine   Tablet 5 milliGRAM(s) Oral daily  LORazepam     Tablet 1 milliGRAM(s) Oral three times a day  memantine 10 milliGRAM(s) Oral two times a day  risperiDONE   Tablet 4 milliGRAM(s) Oral daily  sertraline 100 milliGRAM(s) Oral daily    MEDICATIONS  (PRN):  artificial tears (preservative free) Ophthalmic Solution 1 Drop(s) Both EYES two times a day PRN Dry Eyes  diphenhydrAMINE 50 milliGRAM(s) Oral every 6 hours PRN agitation  diphenhydrAMINE   Injectable 50 milliGRAM(s) IntraMuscular once PRN severe agitation  haloperidol     Tablet 5 milliGRAM(s) Oral every 6 hours PRN agitation  haloperidol    Injectable 5 milliGRAM(s) IntraMuscular once PRN severe agitation  
MEDICATIONS  (STANDING):  amLODIPine   Tablet 5 milliGRAM(s) Oral daily  LORazepam     Tablet 1 milliGRAM(s) Oral <User Schedule>  LORazepam     Tablet 0.5 milliGRAM(s) Oral <User Schedule>  memantine 10 milliGRAM(s) Oral two times a day  risperiDONE   Tablet 4 milliGRAM(s) Oral daily  sertraline 100 milliGRAM(s) Oral daily    MEDICATIONS  (PRN):  artificial tears (preservative free) Ophthalmic Solution 1 Drop(s) Both EYES two times a day PRN Dry Eyes  diphenhydrAMINE 50 milliGRAM(s) Oral every 6 hours PRN agitation  diphenhydrAMINE   Injectable 50 milliGRAM(s) IntraMuscular once PRN severe agitation  haloperidol     Tablet 5 milliGRAM(s) Oral every 6 hours PRN agitation  haloperidol    Injectable 5 milliGRAM(s) IntraMuscular once PRN severe agitation  
MEDICATIONS  (STANDING):  amLODIPine   Tablet 5 milliGRAM(s) Oral daily  LORazepam     Tablet 1 milliGRAM(s) Oral three times a day  memantine 10 milliGRAM(s) Oral two times a day  risperiDONE   Tablet 4 milliGRAM(s) Oral daily  sertraline 100 milliGRAM(s) Oral daily    MEDICATIONS  (PRN):  artificial tears (preservative free) Ophthalmic Solution 1 Drop(s) Both EYES two times a day PRN Dry Eyes  diphenhydrAMINE 50 milliGRAM(s) Oral every 6 hours PRN agitation  diphenhydrAMINE   Injectable 50 milliGRAM(s) IntraMuscular once PRN severe agitation  haloperidol     Tablet 5 milliGRAM(s) Oral every 6 hours PRN agitation  haloperidol    Injectable 5 milliGRAM(s) IntraMuscular once PRN severe agitation  
MEDICATIONS  (STANDING):  amLODIPine   Tablet 5 milliGRAM(s) Oral daily  LORazepam     Tablet 2 milliGRAM(s) Oral four times a day  memantine 10 milliGRAM(s) Oral daily  risperiDONE   Tablet 4 milliGRAM(s) Oral daily  sertraline 100 milliGRAM(s) Oral daily    MEDICATIONS  (PRN):  artificial tears (preservative free) Ophthalmic Solution 1 Drop(s) Both EYES two times a day PRN Dry Eyes  diphenhydrAMINE 50 milliGRAM(s) Oral every 6 hours PRN agitation  diphenhydrAMINE   Injectable 50 milliGRAM(s) IntraMuscular once PRN severe agitation  haloperidol     Tablet 5 milliGRAM(s) Oral every 6 hours PRN agitation  haloperidol    Injectable 5 milliGRAM(s) IntraMuscular once PRN severe agitation  LORazepam     Tablet 2 milliGRAM(s) Oral every 6 hours PRN agitation  LORazepam   Injectable 2 milliGRAM(s) IntraMuscular once PRN severe agitation  
MEDICATIONS  (STANDING):  amLODIPine   Tablet 5 milliGRAM(s) Oral daily  LORazepam     Tablet 2 milliGRAM(s) Oral four times a day  memantine 10 milliGRAM(s) Oral daily  memantine 5 milliGRAM(s) Oral at bedtime  risperiDONE   Tablet 4 milliGRAM(s) Oral daily  sertraline 100 milliGRAM(s) Oral daily    MEDICATIONS  (PRN):  artificial tears (preservative free) Ophthalmic Solution 1 Drop(s) Both EYES two times a day PRN Dry Eyes  diphenhydrAMINE 50 milliGRAM(s) Oral every 6 hours PRN agitation  diphenhydrAMINE   Injectable 50 milliGRAM(s) IntraMuscular once PRN severe agitation  haloperidol     Tablet 5 milliGRAM(s) Oral every 6 hours PRN agitation  haloperidol    Injectable 5 milliGRAM(s) IntraMuscular once PRN severe agitation  LORazepam     Tablet 2 milliGRAM(s) Oral every 6 hours PRN agitation  LORazepam   Injectable 2 milliGRAM(s) IntraMuscular once PRN severe agitation  
MEDICATIONS  (STANDING):  amLODIPine   Tablet 5 milliGRAM(s) Oral daily  LORazepam     Tablet 2 milliGRAM(s) Oral four times a day  memantine 5 milliGRAM(s) Oral daily  risperiDONE   Tablet 4 milliGRAM(s) Oral daily  sertraline 100 milliGRAM(s) Oral daily    MEDICATIONS  (PRN):  artificial tears (preservative free) Ophthalmic Solution 1 Drop(s) Both EYES two times a day PRN Dry Eyes  diphenhydrAMINE 50 milliGRAM(s) Oral every 6 hours PRN agitation  diphenhydrAMINE   Injectable 50 milliGRAM(s) IntraMuscular once PRN severe agitation  haloperidol     Tablet 5 milliGRAM(s) Oral every 6 hours PRN agitation  haloperidol    Injectable 5 milliGRAM(s) IntraMuscular once PRN severe agitation  LORazepam     Tablet 2 milliGRAM(s) Oral every 6 hours PRN agitation  LORazepam   Injectable 2 milliGRAM(s) IntraMuscular once PRN severe agitation  
MEDICATIONS  (STANDING):  amLODIPine   Tablet 5 milliGRAM(s) Oral daily  LORazepam     Tablet 2 milliGRAM(s) Oral four times a day  risperiDONE   Tablet 4 milliGRAM(s) Oral daily  sertraline 100 milliGRAM(s) Oral daily    MEDICATIONS  (PRN):  artificial tears (preservative free) Ophthalmic Solution 1 Drop(s) Both EYES two times a day PRN Dry Eyes  diphenhydrAMINE 50 milliGRAM(s) Oral every 6 hours PRN agitation  diphenhydrAMINE   Injectable 50 milliGRAM(s) IntraMuscular once PRN severe agitation  haloperidol     Tablet 5 milliGRAM(s) Oral every 6 hours PRN agitation  haloperidol    Injectable 5 milliGRAM(s) IntraMuscular once PRN severe agitation  LORazepam     Tablet 2 milliGRAM(s) Oral every 6 hours PRN agitation  LORazepam   Injectable 2 milliGRAM(s) IntraMuscular once PRN severe agitation  
MEDICATIONS  (STANDING):  amLODIPine   Tablet 5 milliGRAM(s) Oral daily  LORazepam     Tablet 1 milliGRAM(s) Oral <User Schedule>  LORazepam     Tablet 0.5 milliGRAM(s) Oral <User Schedule>  memantine 10 milliGRAM(s) Oral two times a day  risperiDONE   Tablet 4 milliGRAM(s) Oral daily  sertraline 100 milliGRAM(s) Oral daily    MEDICATIONS  (PRN):  artificial tears (preservative free) Ophthalmic Solution 1 Drop(s) Both EYES two times a day PRN Dry Eyes  diphenhydrAMINE 50 milliGRAM(s) Oral every 6 hours PRN agitation  diphenhydrAMINE   Injectable 50 milliGRAM(s) IntraMuscular once PRN severe agitation  haloperidol     Tablet 5 milliGRAM(s) Oral every 6 hours PRN agitation  haloperidol    Injectable 5 milliGRAM(s) IntraMuscular once PRN severe agitation  
MEDICATIONS  (STANDING):  amLODIPine   Tablet 5 milliGRAM(s) Oral daily  memantine 10 milliGRAM(s) Oral daily  memantine 5 milliGRAM(s) Oral at bedtime  risperiDONE   Tablet 4 milliGRAM(s) Oral daily  sertraline 100 milliGRAM(s) Oral daily    MEDICATIONS  (PRN):  artificial tears (preservative free) Ophthalmic Solution 1 Drop(s) Both EYES two times a day PRN Dry Eyes  diphenhydrAMINE 50 milliGRAM(s) Oral every 6 hours PRN agitation  diphenhydrAMINE   Injectable 50 milliGRAM(s) IntraMuscular once PRN severe agitation  haloperidol     Tablet 5 milliGRAM(s) Oral every 6 hours PRN agitation  haloperidol    Injectable 5 milliGRAM(s) IntraMuscular once PRN severe agitation  
MEDICATIONS  (STANDING):  amLODIPine   Tablet 5 milliGRAM(s) Oral daily  LORazepam     Tablet 1 milliGRAM(s) Oral three times a day  memantine 10 milliGRAM(s) Oral two times a day  risperiDONE   Tablet 4 milliGRAM(s) Oral daily  sertraline 100 milliGRAM(s) Oral daily    MEDICATIONS  (PRN):  artificial tears (preservative free) Ophthalmic Solution 1 Drop(s) Both EYES two times a day PRN Dry Eyes  diphenhydrAMINE 50 milliGRAM(s) Oral every 6 hours PRN agitation  diphenhydrAMINE   Injectable 50 milliGRAM(s) IntraMuscular once PRN severe agitation  haloperidol     Tablet 5 milliGRAM(s) Oral every 6 hours PRN agitation  haloperidol    Injectable 5 milliGRAM(s) IntraMuscular once PRN severe agitation  
MEDICATIONS  (STANDING):  amLODIPine   Tablet 5 milliGRAM(s) Oral daily  LORazepam     Tablet 1 milliGRAM(s) Oral <User Schedule>  LORazepam     Tablet 0.5 milliGRAM(s) Oral <User Schedule>  memantine 10 milliGRAM(s) Oral two times a day  risperiDONE   Tablet 4 milliGRAM(s) Oral daily  sertraline 100 milliGRAM(s) Oral daily    MEDICATIONS  (PRN):  artificial tears (preservative free) Ophthalmic Solution 1 Drop(s) Both EYES two times a day PRN Dry Eyes  diphenhydrAMINE 50 milliGRAM(s) Oral every 6 hours PRN agitation  diphenhydrAMINE   Injectable 50 milliGRAM(s) IntraMuscular once PRN severe agitation  haloperidol     Tablet 5 milliGRAM(s) Oral every 6 hours PRN agitation  haloperidol    Injectable 5 milliGRAM(s) IntraMuscular once PRN severe agitation  
MEDICATIONS  (STANDING):  amLODIPine   Tablet 5 milliGRAM(s) Oral daily  LORazepam     Tablet 1 milliGRAM(s) Oral <User Schedule>  memantine 10 milliGRAM(s) Oral two times a day  risperiDONE   Tablet 4 milliGRAM(s) Oral daily  sertraline 100 milliGRAM(s) Oral daily    MEDICATIONS  (PRN):  artificial tears (preservative free) Ophthalmic Solution 1 Drop(s) Both EYES two times a day PRN Dry Eyes  diphenhydrAMINE 50 milliGRAM(s) Oral every 6 hours PRN agitation  diphenhydrAMINE   Injectable 50 milliGRAM(s) IntraMuscular once PRN severe agitation  haloperidol     Tablet 5 milliGRAM(s) Oral every 6 hours PRN agitation  haloperidol    Injectable 5 milliGRAM(s) IntraMuscular once PRN severe agitation  
MEDICATIONS  (STANDING):  amLODIPine   Tablet 10 milliGRAM(s) Oral daily  LORazepam     Tablet 1 milliGRAM(s) Oral <User Schedule>  memantine 10 milliGRAM(s) Oral two times a day  risperiDONE   Tablet 4 milliGRAM(s) Oral daily  sertraline 100 milliGRAM(s) Oral daily    MEDICATIONS  (PRN):  artificial tears (preservative free) Ophthalmic Solution 1 Drop(s) Both EYES two times a day PRN Dry Eyes  diphenhydrAMINE 50 milliGRAM(s) Oral every 6 hours PRN agitation  diphenhydrAMINE   Injectable 50 milliGRAM(s) IntraMuscular once PRN severe agitation  haloperidol     Tablet 5 milliGRAM(s) Oral every 6 hours PRN agitation  haloperidol    Injectable 5 milliGRAM(s) IntraMuscular once PRN severe agitation  
MEDICATIONS  (STANDING):  amLODIPine   Tablet 5 milliGRAM(s) Oral daily  LORazepam     Tablet 1 milliGRAM(s) Oral <User Schedule>  memantine 10 milliGRAM(s) Oral two times a day  risperiDONE   Tablet 4 milliGRAM(s) Oral daily  sertraline 100 milliGRAM(s) Oral daily    MEDICATIONS  (PRN):  artificial tears (preservative free) Ophthalmic Solution 1 Drop(s) Both EYES two times a day PRN Dry Eyes  diphenhydrAMINE 50 milliGRAM(s) Oral every 6 hours PRN agitation  diphenhydrAMINE   Injectable 50 milliGRAM(s) IntraMuscular once PRN severe agitation  haloperidol     Tablet 5 milliGRAM(s) Oral every 6 hours PRN agitation  haloperidol    Injectable 5 milliGRAM(s) IntraMuscular once PRN severe agitation  
MEDICATIONS  (STANDING):  amLODIPine   Tablet 10 milliGRAM(s) Oral daily  LORazepam     Tablet 1 milliGRAM(s) Oral <User Schedule>  memantine 10 milliGRAM(s) Oral two times a day  risperiDONE   Tablet 4 milliGRAM(s) Oral daily  sertraline 100 milliGRAM(s) Oral daily    MEDICATIONS  (PRN):  artificial tears (preservative free) Ophthalmic Solution 1 Drop(s) Both EYES two times a day PRN Dry Eyes  diphenhydrAMINE 50 milliGRAM(s) Oral every 6 hours PRN agitation  diphenhydrAMINE   Injectable 50 milliGRAM(s) IntraMuscular once PRN severe agitation  haloperidol     Tablet 5 milliGRAM(s) Oral every 6 hours PRN agitation  haloperidol    Injectable 5 milliGRAM(s) IntraMuscular once PRN severe agitation  

## 2021-07-19 NOTE — BH INPATIENT PSYCHIATRY PROGRESS NOTE - NSTXPSYCHODATETRGT_PSY_ALL_CORE
03-Jun-2021
11-Jun-2021
03-Jun-2021
27-May-2021
11-Jun-2021
27-May-2021
03-Jun-2021
03-Jun-2021
27-May-2021
03-Jun-2021
27-May-2021
03-Jun-2021
27-May-2021
03-Jun-2021
27-May-2021
27-May-2021
03-Jun-2021
27-May-2021
03-Jun-2021
27-May-2021
03-Jun-2021

## 2021-07-19 NOTE — BH DISCHARGE NOTE NURSING/SOCIAL WORK/PSYCH REHAB - NSCDUDCCRISIS_PSY_A_CORE
[facility]  [phone] [facility]-WELL ([phone])  Text "WELL" to [phone]  Website: www.[email or web address] 1 (212) 211-IXTI (3849) Website: www.[employer].org/.National Suicide Prevention Lifeline 3 (909) 741-3281/.  [employer]  1 (729) L[employer] (883-0458)/.  [facility]  [phone] [place] [phone]   Hours:  Monday through Friday from 9 AM to 3 PM/.  [employer]  [employer] [facility] Crisis [facility]  [phone], Option 1

## 2021-07-19 NOTE — BH INPATIENT PSYCHIATRY PROGRESS NOTE - NSBHCHARTREVIEWVS_PSY_A_CORE FT
Vital Signs Last 24 Hrs  T(C): 36.1 (09 Jul 2021 07:30), Max: 36.8 (08 Jul 2021 16:13)  T(F): 97 (09 Jul 2021 07:30), Max: 98.3 (08 Jul 2021 16:13)  HR: --  BP: --  BP(mean): --  RR: --  SpO2: --
Vital Signs Last 24 Hrs  T(C): 36.1 (30 Jun 2021 08:10), Max: 36.8 (29 Jun 2021 17:38)  T(F): 97 (30 Jun 2021 08:10), Max: 98.3 (29 Jun 2021 17:38)  HR: 81 (30 Jun 2021 08:10) (81 - 81)  BP: 135/83 (30 Jun 2021 08:10) (135/83 - 135/83)  BP(mean): --  RR: 18 (30 Jun 2021 08:10) (18 - 18)  SpO2: --
Vital Signs Last 24 Hrs  T(C): 36.2 (18 May 2021 08:16), Max: 36.8 (17 May 2021 17:28)  T(F): 97.1 (18 May 2021 08:16), Max: 98.3 (17 May 2021 17:28)  HR: --  BP: --  BP(mean): --  RR: --  SpO2: --
Vital Signs Last 24 Hrs  T(C): 36.7 (22 May 2021 17:21), Max: 36.7 (22 May 2021 17:21)  T(F): 98 (22 May 2021 17:21), Max: 98 (22 May 2021 17:21)  HR: --  BP: --  BP(mean): --  RR: --  SpO2: --
Vital Signs Last 24 Hrs  T(C): 36.8 (03 Jun 2021 17:34), Max: 36.8 (03 Jun 2021 17:34)  T(F): 98.2 (03 Jun 2021 17:34), Max: 98.2 (03 Jun 2021 17:34)  HR: --  BP: --  BP(mean): --  RR: --  SpO2: --
Vital Signs Last 24 Hrs  T(C): 36.7 (21 Jun 2021 08:08), Max: 36.8 (20 Jun 2021 17:23)  T(F): 98 (21 Jun 2021 08:08), Max: 98.3 (20 Jun 2021 17:23)  HR: 75 (21 Jun 2021 08:08) (75 - 75)  BP: 141/86 (21 Jun 2021 08:08) (141/86 - 141/86)  BP(mean): --  RR: --  SpO2: --
Vital Signs Last 24 Hrs  T(C): 36.7 (25 May 2021 08:39), Max: 36.7 (25 May 2021 08:39)  T(F): 98.1 (25 May 2021 08:39), Max: 98.1 (25 May 2021 08:39)  HR: --  BP: --  BP(mean): --  RR: --  SpO2: --
Vital Signs Last 24 Hrs  T(C): 36.8 (01 Jul 2021 07:24), Max: 36.8 (01 Jul 2021 07:24)  T(F): 98.2 (01 Jul 2021 07:24), Max: 98.2 (01 Jul 2021 07:24)  HR: --  BP: --  BP(mean): --  RR: --  SpO2: --
Vital Signs Last 24 Hrs  T(C): 36.2 (30 May 2021 08:30), Max: 36.6 (29 May 2021 18:17)  T(F): 97.2 (30 May 2021 08:30), Max: 97.9 (29 May 2021 18:17)  HR: 84 (30 May 2021 08:30) (84 - 84)  BP: 139/85 (30 May 2021 08:30) (139/85 - 139/85)  BP(mean): --  RR: --  SpO2: --
Vital Signs Last 24 Hrs  T(C): 36.4 (28 Jun 2021 07:36), Max: 36.9 (27 Jun 2021 16:58)  T(F): 97.6 (28 Jun 2021 07:36), Max: 98.5 (27 Jun 2021 16:58)  HR: --  BP: --  BP(mean): --  RR: --  SpO2: --
Vital Signs Last 24 Hrs  T(C): 36.7 (16 Jun 2021 07:36), Max: 36.7 (15 Yann 2021 18:17)  T(F): 98.1 (16 Jun 2021 07:36), Max: 98.1 (15 Yann 2021 18:17)  HR: --  BP: --  BP(mean): --  RR: --  SpO2: --
Vital Signs Last 24 Hrs  T(C): 36.7 (25 Jun 2021 08:05), Max: 36.7 (24 Jun 2021 17:08)  T(F): 98.1 (25 Jun 2021 08:05), Max: 98.1 (24 Jun 2021 17:08)  HR: 83 (25 Jun 2021 08:05) (83 - 83)  BP: 147/88 (25 Jun 2021 08:05) (147/88 - 147/88)  BP(mean): --  RR: --  SpO2: --
Vital Signs Last 24 Hrs  T(C): 36.9 (13 May 2021 08:44), Max: 36.9 (12 May 2021 17:32)  T(F): 98.5 (13 May 2021 08:44), Max: 98.5 (12 May 2021 17:32)  HR: 96 (13 May 2021 10:00) (79 - 96)  BP: 130/85 (13 May 2021 10:00) (130/85 - 130/85)  BP(mean): --  RR: --  SpO2: --
Vital Signs Last 24 Hrs  T(C): 36.5 (19 May 2021 08:47), Max: 36.8 (18 May 2021 17:18)  T(F): 97.7 (19 May 2021 08:47), Max: 98.2 (18 May 2021 17:18)  HR: --  BP: --  BP(mean): --  RR: --  SpO2: --
Vital Signs Last 24 Hrs  T(C): 36.6 (11 May 2021 09:33), Max: 36.6 (11 May 2021 09:33)  T(F): 97.8 (11 May 2021 09:33), Max: 97.8 (11 May 2021 09:33)  HR: --  BP: --  BP(mean): --  RR: 18 (11 May 2021 09:33) (18 - 18)  SpO2: --
Vital Signs Last 24 Hrs  T(C): 36.3 (10 Yann 2021 08:34), Max: 36.9 (09 Jun 2021 17:14)  T(F): 97.3 (10 Yann 2021 08:34), Max: 98.4 (09 Jun 2021 17:14)  HR: --  BP: 100/70 (10 Yann 2021 08:34) (100/70 - 100/70)  BP(mean): 72 (10 Yann 2021 08:34) (72 - 72)  RR: --  SpO2: --
Vital Signs Last 24 Hrs  T(C): 36.5 (07 Jun 2021 09:19), Max: 36.9 (06 Jun 2021 17:14)  T(F): 97.7 (07 Jun 2021 09:19), Max: 98.5 (06 Jun 2021 17:14)  HR: --  BP: --  BP(mean): --  RR: --  SpO2: --
Vital Signs Last 24 Hrs  T(C): 36.9 (23 Jun 2021 08:40), Max: 36.9 (22 Jun 2021 17:23)  T(F): 98.5 (23 Jun 2021 08:40), Max: 98.5 (22 Jun 2021 17:23)  HR: 94 (23 Jun 2021 09:08) (81 - 94)  BP: 122/71 (23 Jun 2021 09:08) (122/71 - 141/90)  BP(mean): --  RR: --  SpO2: --
Vital Signs Last 24 Hrs  T(C): 36.4 (24 May 2021 08:21), Max: 36.9 (23 May 2021 14:53)  T(F): 97.6 (24 May 2021 08:21), Max: 98.4 (23 May 2021 14:53)  HR: 89 (23 May 2021 14:53) (89 - 89)  BP: 135/86 (23 May 2021 14:53) (135/86 - 135/86)  BP(mean): --  RR: 18 (24 May 2021 08:21) (18 - 18)  SpO2: --
Vital Signs Last 24 Hrs  T(C): 36.8 (15 Yann 2021 07:44), Max: 36.9 (14 Jun 2021 18:07)  T(F): 98.2 (15 Yann 2021 07:44), Max: 98.5 (14 Jun 2021 18:07)  HR: --  BP: --  BP(mean): --  RR: --  SpO2: --
Vital Signs Last 24 Hrs  T(C): 36.8 (18 Jun 2021 08:12), Max: 36.8 (18 Jun 2021 08:12)  T(F): 98.3 (18 Jun 2021 08:12), Max: 98.3 (18 Jun 2021 08:12)  HR: --  BP: 143/90 (18 Jun 2021 08:12) (143/90 - 143/90)  BP(mean): 76 (18 Jun 2021 08:12) (76 - 76)  RR: --  SpO2: --
Vital Signs Last 24 Hrs  T(C): 36.9 (24 Jun 2021 07:30), Max: 37.6 (23 Jun 2021 17:26)  T(F): 98.4 (24 Jun 2021 07:30), Max: 99.6 (23 Jun 2021 17:26)  HR: --  BP: --  BP(mean): --  RR: --  SpO2: --
Vital Signs Last 24 Hrs  T(C): 36.4 (28 May 2021 08:19), Max: 36.7 (27 May 2021 17:13)  T(F): 97.5 (28 May 2021 08:19), Max: 98 (27 May 2021 17:13)  HR: 91 (28 May 2021 08:19) (91 - 91)  BP: 150/96 (28 May 2021 08:19) (150/96 - 150/96)  BP(mean): --  RR: --  SpO2: --
Vital Signs Last 24 Hrs  T(C): 36.9 (29 May 2021 08:13), Max: 36.9 (29 May 2021 08:13)  T(F): 98.4 (29 May 2021 08:13), Max: 98.4 (29 May 2021 08:13)  HR: 100 (29 May 2021 08:13) (100 - 100)  BP: 150/80 (29 May 2021 08:13) (150/80 - 150/80)  BP(mean): --  RR: --  SpO2: --
Vital Signs Last 24 Hrs  T(C): 36.2 (29 Jun 2021 08:30), Max: 36.3 (28 Jun 2021 16:51)  T(F): 97.1 (29 Jun 2021 08:30), Max: 97.4 (28 Jun 2021 16:51)  HR: --  BP: --  BP(mean): --  RR: --  SpO2: --
Vital Signs Last 24 Hrs  T(C): 37.1 (17 Jun 2021 07:33), Max: 37.1 (17 Jun 2021 07:33)  T(F): 98.7 (17 Jun 2021 07:33), Max: 98.7 (17 Jun 2021 07:33)  HR: --  BP: --  BP(mean): --  RR: --  SpO2: --
Vital Signs Last 24 Hrs  T(C): 36.9 (14 May 2021 07:55), Max: 36.9 (13 May 2021 17:55)  T(F): 98.5 (14 May 2021 07:55), Max: 98.5 (14 May 2021 07:55)  HR: --  BP: --  BP(mean): --  RR: --  SpO2: --
05-14-21 @ 07:55  Sitting BP: 146/99 HR: 93  Site: upper left arm   Mode: electronic    Patient refused vital check this morning
Vital Signs Last 24 Hrs  T(C): 36.2 (14 Jul 2021 07:00), Max: 36.9 (13 Jul 2021 19:07)  T(F): 97.1 (14 Jul 2021 07:00), Max: 98.4 (13 Jul 2021 19:07)  HR: --  BP: 149/77 (14 Jul 2021 07:00) (149/77 - 149/77)  BP(mean): 72 (14 Jul 2021 07:00) (72 - 72)  RR: --  SpO2: --
Vital Signs Last 24 Hrs  T(C): 36.7 (27 May 2021 08:15), Max: 37.1 (26 May 2021 17:38)  T(F): 98 (27 May 2021 08:15), Max: 98.7 (26 May 2021 17:38)  HR: 81 (27 May 2021 08:15) (81 - 81)  BP: 150/88 (27 May 2021 08:15) (150/88 - 150/88)  BP(mean): --  RR: --  SpO2: --
Vital Signs Last 24 Hrs  T(C): 36.3 (31 May 2021 17:32), Max: 36.3 (31 May 2021 17:32)  T(F): 97.4 (31 May 2021 17:32), Max: 97.4 (31 May 2021 17:32)  HR: --  BP: --  BP(mean): --  RR: --  SpO2: --
Vital Signs Last 24 Hrs  T(C): 36.9 (14 Jun 2021 07:15), Max: 36.9 (14 Jun 2021 07:15)  T(F): 98.4 (14 Jun 2021 07:15), Max: 98.4 (14 Jun 2021 07:15)  HR: --  BP: --  BP(mean): --  RR: --  SpO2: --
Vital Signs Last 24 Hrs  T(C): 36.4 (20 May 2021 06:23), Max: 36.4 (20 May 2021 06:23)  T(F): 97.6 (20 May 2021 06:23), Max: 97.6 (20 May 2021 06:23)  HR: --  BP: --  BP(mean): --  RR: --  SpO2: --
Vital Signs Last 24 Hrs  T(C): 36.7 (11 Jun 2021 07:48), Max: 36.7 (10 Yann 2021 17:04)  T(F): 98.1 (11 Jun 2021 07:48), Max: 98.1 (10 Yann 2021 17:04)  HR: 76 (11 Jun 2021 07:48) (76 - 76)  BP: 135/85 (11 Jun 2021 07:48) (135/85 - 135/85)  BP(mean): --  RR: --  SpO2: --
Vital Signs Last 24 Hrs  T(C): 36.4 (16 May 2021 08:18), Max: 36.4 (16 May 2021 08:18)  T(F): 97.6 (16 May 2021 08:18), Max: 97.6 (16 May 2021 08:18)  HR: 91 (16 May 2021 09:30) (83 - 91)  BP: 142/87 (16 May 2021 09:30) (142/87 - 150/95)  
Vital Signs Last 24 Hrs  T(C): 36.7 (02 Jun 2021 07:24), Max: 36.7 (02 Jun 2021 07:24)  T(F): 98.1 (02 Jun 2021 07:24), Max: 98.1 (02 Jun 2021 07:24)  HR: --  BP: --  BP(mean): --  RR: --  SpO2: --
Vital Signs Last 24 Hrs  T(C): 36.8 (02 Jul 2021 08:18), Max: 36.8 (02 Jul 2021 08:18)  T(F): 98.2 (02 Jul 2021 08:18), Max: 98.2 (02 Jul 2021 08:18)  HR: 83 (02 Jul 2021 08:18) (83 - 83)  BP: 150/90 (02 Jul 2021 08:18) (150/90 - 150/90)  BP(mean): --  RR: --  SpO2: --
Vital Signs Last 24 Hrs  T(C): 36.8 (07 Jul 2021 08:00), Max: 36.8 (07 Jul 2021 08:00)  T(F): 98.2 (07 Jul 2021 08:00), Max: 98.2 (07 Jul 2021 08:00)  HR: --  BP: --  BP(mean): --  RR: --  SpO2: --
Vital Signs Last 24 Hrs  T(C): 36.8 (16 Jul 2021 08:15), Max: 36.8 (16 Jul 2021 08:15)  T(F): 98.3 (16 Jul 2021 08:15), Max: 98.3 (16 Jul 2021 08:15)  HR: 98 (16 Jul 2021 08:15) (98 - 98)  BP: 150/92 (16 Jul 2021 08:15) (150/92 - 150/92)  BP(mean): --  RR: --  SpO2: --
Vital Signs Last 24 Hrs  T(C): 36.2 (17 May 2021 08:14), Max: 37.1 (16 May 2021 17:53)  T(F): 97.1 (17 May 2021 08:14), Max: 98.7 (16 May 2021 17:53)  HR: 83 (17 May 2021 08:14) (83 - 83)  BP: --  BP(mean): --  RR: --  SpO2: --
Vital Signs Last 24 Hrs  T(C): 36.4 (22 Jun 2021 08:18), Max: 36.9 (21 Jun 2021 17:21)  T(F): 97.6 (22 Jun 2021 08:18), Max: 98.5 (21 Jun 2021 17:21)  HR: 78 (22 Jun 2021 08:18) (78 - 78)  BP: 150/86 (22 Jun 2021 08:18) (150/86 - 150/86)  BP(mean): --  RR: --  SpO2: --
Vital Signs Last 24 Hrs  T(C): 37.2 (03 Jun 2021 08:30), Max: 37.2 (03 Jun 2021 08:30)  T(F): 99 (03 Jun 2021 08:30), Max: 99 (03 Jun 2021 08:30)  HR: 91 (03 Jun 2021 08:30) (91 - 91)  BP: 142/87 (03 Jun 2021 08:30) (142/87 - 142/87)  BP(mean): --  RR: --  SpO2: --
Vital Signs Last 24 Hrs  T(C): 36.4 (31 May 2021 08:22), Max: 36.4 (31 May 2021 08:22)  T(F): 97.6 (31 May 2021 08:22), Max: 97.6 (31 May 2021 08:22)  HR: 91 (31 May 2021 08:22) (91 - 91)  BP: 145/80 (31 May 2021 08:22) (145/80 - 145/80)  BP(mean): --  RR: --  SpO2: --
Vital Signs Last 24 Hrs  T(C): 36.2 (09 Jun 2021 08:21), Max: 36.7 (08 Jun 2021 17:16)  T(F): 97.1 (09 Jun 2021 08:21), Max: 98.1 (08 Jun 2021 17:16)  HR: --  BP: --  BP(mean): --  RR: --  SpO2: --
Vital Signs Last 24 Hrs  T(C): 36.7 (08 Jul 2021 08:18), Max: 37.1 (07 Jul 2021 17:01)  T(F): 98.1 (08 Jul 2021 08:18), Max: 98.7 (07 Jul 2021 17:01)  HR: 108 (08 Jul 2021 08:18) (108 - 108)  BP: 146/96 (08 Jul 2021 08:18) (146/96 - 146/96)  BP(mean): --  RR: --  SpO2: --
Vital Signs Last 24 Hrs  T(C): 36.4 (26 May 2021 08:36), Max: 37.2 (25 May 2021 17:14)  T(F): 97.6 (26 May 2021 08:36), Max: 98.9 (25 May 2021 17:14)  HR: --  BP: --  BP(mean): --  RR: --  SpO2: --
Vital Signs Last 24 Hrs  T(C): 36.7 (22 May 2021 17:21), Max: 36.7 (22 May 2021 17:21)  T(F): 98 (22 May 2021 17:21), Max: 98 (22 May 2021 17:21)  HR: --  BP: --  BP(mean): --  RR: --  SpO2: --
Vital Signs Last 24 Hrs  T(C): 36.9 (12 May 2021 08:43), Max: 36.9 (12 May 2021 08:43)  T(F): 98.4 (12 May 2021 08:43), Max: 98.4 (12 May 2021 08:43)  HR: --  BP: --  BP(mean): --  RR: --  SpO2: --
Vital Signs Last 24 Hrs  T(C): 36.7 (21 May 2021 07:39), Max: 36.7 (21 May 2021 07:39)  T(F): 98.1 (21 May 2021 07:39), Max: 98.1 (21 May 2021 07:39)  HR: --  BP: --  BP(mean): --  RR: --  SpO2: --
Vital Signs Last 24 Hrs  T(C): 36.1 (13 Jul 2021 08:30), Max: 36.5 (12 Jul 2021 20:15)  T(F): 97 (13 Jul 2021 08:30), Max: 97.7 (12 Jul 2021 20:15)  HR: --  BP: --  BP(mean): --  RR: --  SpO2: --
Vital Signs Last 24 Hrs  T(C): 36.9 (19 Jul 2021 07:19), Max: 37.2 (18 Jul 2021 10:53)  T(F): 98.4 (19 Jul 2021 07:19), Max: 98.9 (18 Jul 2021 10:53)  HR: --  BP: --  BP(mean): --  RR: --  SpO2: --
Vital Signs Last 24 Hrs  T(C): 36.7 (08 Jun 2021 08:07), Max: 36.7 (08 Jun 2021 08:07)  T(F): 98 (08 Jun 2021 08:07), Max: 98 (08 Jun 2021 08:07)  HR: --  BP: --  BP(mean): --  RR: 18 (08 Jun 2021 08:07) (18 - 18)  SpO2: --
Vital Signs Last 24 Hrs  T(C): 36.5 (06 Jul 2021 07:58), Max: 36.6 (05 Jul 2021 17:28)  T(F): 97.7 (06 Jul 2021 07:58), Max: 97.8 (05 Jul 2021 17:28)  HR: --  BP: --  BP(mean): --  RR: --  SpO2: --
Vital Signs Last 24 Hrs  T(C): 36.7 (12 Jul 2021 07:12), Max: 36.7 (12 Jul 2021 07:12)  T(F): 98 (12 Jul 2021 07:12), Max: 98 (12 Jul 2021 07:12)  HR: --  BP: --  BP(mean): --  RR: --  SpO2: --
Vital Signs Last 24 Hrs  T(C): 36.8 (15 Jul 2021 08:02), Max: 37.1 (14 Jul 2021 16:43)  T(F): 98.3 (15 Jul 2021 08:02), Max: 98.7 (14 Jul 2021 16:43)  HR: 79 (15 Jul 2021 08:02) (79 - 79)  BP: 146/79 (15 Jul 2021 08:02) (146/79 - 146/79)  BP(mean): --  RR: --  SpO2: --

## 2021-07-19 NOTE — BH DISCHARGE NOTE NURSING/SOCIAL WORK/PSYCH REHAB - DISCHARGE INSTRUCTIONS AFTERCARE APPOINTMENTS
In order to check the location, date, or time of your aftercare appointment, please refer to your Discharge Instructions Document given to you upon leaving the hospital.  If you have lost the instructions please call [phone]

## 2022-08-16 NOTE — BH INPATIENT PSYCHIATRY ASSESSMENT NOTE - VIOLENCE RISK FACTORS:
Health Maintenance Due   Topic Date Due   • Hepatitis B Vaccine (1 of 3 - 3-dose series) Never done   • Shingles Vaccine (1 of 2) Never done   • DTaP/Tdap/Td Vaccine (1 - Tdap) 11/16/2007   • COVID-19 Vaccine (3 - Booster for Moderna series) 08/26/2021   • DM/CKD Microalbumin  09/30/2021   • Diabetes Eye Exam  04/19/2022   • Diabetes A1C  06/09/2022   • Traditional Medicare- Medicare Wellness Visit  02/08/2023   • Depression Screening  02/08/2023       Patient is due for topics as listed above but is not proceeding with Immunization(s) COVID-19, Dtap/Tdap/Td, Hep B and Shingles at this time. Orders placed for Diabetes A1C and DM/CKD Microalbumin  Clinic hours for [facility]:  Monday 7 am - 5 pm  Tuesday 7 am - 5 pm  Wednesday 7 am - 5 pm  Thursday 7 am - 5 pm  Friday 7 am - 4 pm      If you need a refill on your prescription please call your pharmacy and let them know. Please be proactive and call before your medication runs out. The pharmacy will then contact us for the refill. Please allow 24-48 hours for the refill to be processed.     If your physician has ordered additional laboratory or radiology testing as part of your ongoing plan of care, please allow 7-10 business days from the day of your lab draw or test for the results to be sent and reviewed by your provider. If your results are critical and require more immediate intervention, you will be contacted sooner. Your results will be conveyed to you via a phone call or letter.    You may be receiving a patient satisfaction survey in the mail. Please take the time to complete, as your feedback is very important to us. We strive to make your experience exceptional and your comments help us with that goal. We look forward to hearing from you.       None Known

## 2023-12-04 NOTE — PROVIDER CONTACT NOTE (OTHER) - ASSESSMENT
HR sustaining to 140s, pt agitated statinng, "I want to go home" Refer to the Assessment tab to view/cancel completed assessment.

## 2024-10-11 NOTE — BH SOCIAL WORK INITIAL PSYCHOSOCIAL EVALUATION - OTHER PAST PSYCHIATRIC HISTORY (INCLUDE DETAILS REGARDING ONSET, COURSE OF ILLNESS, INPATIENT/OUTPATIENT TREATMENT)
As per  Inpatient Psychiatry Assessment Note on 5/10/2021: "45M with PPHx of schizophrenia brought in by father for change in mental status and functional deterioration, admitted to medicine for HTN which normalized, now transferred to psychiatry for continued evaluation/treatment of worsening psychosis and functional status." Not Available Available

## 2025-07-01 NOTE — BH INPATIENT PSYCHIATRY PROGRESS NOTE - NSBHFUPINTERVALHXFT_PSY_A_CORE
Pt aox4, skin pink warm and dry, airway patent, rr even and unlabored, NAD noted. No new complaints. Pt vss. Pt given discharge instructions without further questions. Ambulates upon discharge with steady gait without new incident or distress.     Pt was found on his bed,  woke up with this writer calling his name,stated that he wanted to go home though for the most of questions,  he would stare into space and did not answer.  Pt reportedly fell during the weekend and currently on fall precaution.  NO physical injury sustained. Nursing staff reports pt eating on his won,  can sit up nad also requested a newspaper to read.